# Patient Record
Sex: FEMALE | Race: WHITE | Employment: OTHER | URBAN - METROPOLITAN AREA
[De-identification: names, ages, dates, MRNs, and addresses within clinical notes are randomized per-mention and may not be internally consistent; named-entity substitution may affect disease eponyms.]

---

## 2021-06-30 PROBLEM — I25.5 ISCHEMIC CARDIOMYOPATHY: Status: ACTIVE | Noted: 2021-06-30

## 2021-07-01 ENCOUNTER — HOSPITAL ENCOUNTER (OUTPATIENT)
Age: 61
Discharge: HOME | End: 2021-07-02
Attending: INTERNAL MEDICINE | Admitting: INTERNAL MEDICINE
Payer: COMMERCIAL

## 2021-07-01 DIAGNOSIS — I25.5 ISCHEMIC CARDIOMYOPATHY: ICD-10-CM

## 2021-07-01 DIAGNOSIS — I25.10 CORONARY ARTERY DISEASE WITHOUT ANGINA PECTORIS, UNSPECIFIED VESSEL OR LESION TYPE, UNSPECIFIED WHETHER NATIVE OR TRANSPLANTED HEART: ICD-10-CM

## 2021-07-01 LAB
ACT BLD: 246 SECS (ref 70–128)
ACT BLD: 301 SECS (ref 70–128)
ANION GAP SERPL CALC-SCNC: 6 MMOL/L (ref 7–16)
ATRIAL RATE: 79 BPM
ATRIAL RATE: 93 BPM
BASOPHILS # BLD: 0.1 K/UL (ref 0–0.2)
BASOPHILS NFR BLD: 1 % (ref 0–2)
BUN SERPL-MCNC: 12 MG/DL (ref 6–23)
CALCIUM SERPL-MCNC: 9 MG/DL (ref 8.3–10.4)
CALCULATED P AXIS, ECG09: 49 DEGREES
CALCULATED P AXIS, ECG09: 54 DEGREES
CALCULATED R AXIS, ECG10: 37 DEGREES
CALCULATED R AXIS, ECG10: 42 DEGREES
CALCULATED T AXIS, ECG11: 77 DEGREES
CALCULATED T AXIS, ECG11: 81 DEGREES
CHLORIDE SERPL-SCNC: 106 MMOL/L (ref 98–107)
CO2 SERPL-SCNC: 29 MMOL/L (ref 21–32)
CREAT SERPL-MCNC: 0.49 MG/DL (ref 0.6–1)
DIAGNOSIS, 93000: NORMAL
DIAGNOSIS, 93000: NORMAL
DIFFERENTIAL METHOD BLD: NORMAL
EOSINOPHIL # BLD: 0.2 K/UL (ref 0–0.8)
EOSINOPHIL NFR BLD: 3 % (ref 0.5–7.8)
ERYTHROCYTE [DISTWIDTH] IN BLOOD BY AUTOMATED COUNT: 13.4 % (ref 11.9–14.6)
GLUCOSE BLD STRIP.AUTO-MCNC: 186 MG/DL (ref 65–100)
GLUCOSE BLD STRIP.AUTO-MCNC: 216 MG/DL (ref 65–100)
GLUCOSE SERPL-MCNC: 213 MG/DL (ref 65–100)
HCT VFR BLD AUTO: 42.4 % (ref 35.8–46.3)
HGB BLD-MCNC: 14.1 G/DL (ref 11.7–15.4)
IMM GRANULOCYTES # BLD AUTO: 0 K/UL (ref 0–0.5)
IMM GRANULOCYTES NFR BLD AUTO: 1 % (ref 0–5)
INR PPP: 0.9
LYMPHOCYTES # BLD: 2.4 K/UL (ref 0.5–4.6)
LYMPHOCYTES NFR BLD: 30 % (ref 13–44)
MAGNESIUM SERPL-MCNC: 1.9 MG/DL (ref 1.8–2.4)
MCH RBC QN AUTO: 29.7 PG (ref 26.1–32.9)
MCHC RBC AUTO-ENTMCNC: 33.3 G/DL (ref 31.4–35)
MCV RBC AUTO: 89.3 FL (ref 79.6–97.8)
MONOCYTES # BLD: 0.6 K/UL (ref 0.1–1.3)
MONOCYTES NFR BLD: 7 % (ref 4–12)
NEUTS SEG # BLD: 4.7 K/UL (ref 1.7–8.2)
NEUTS SEG NFR BLD: 59 % (ref 43–78)
NRBC # BLD: 0 K/UL (ref 0–0.2)
P-R INTERVAL, ECG05: 216 MS
P-R INTERVAL, ECG05: 236 MS
PLATELET # BLD AUTO: 201 K/UL (ref 150–450)
PMV BLD AUTO: 10.9 FL (ref 9.4–12.3)
POTASSIUM SERPL-SCNC: 3.9 MMOL/L (ref 3.5–5.1)
PROTHROMBIN TIME: 12.6 SEC (ref 12.5–14.7)
Q-T INTERVAL, ECG07: 392 MS
Q-T INTERVAL, ECG07: 434 MS
QRS DURATION, ECG06: 80 MS
QRS DURATION, ECG06: 82 MS
QTC CALCULATION (BEZET), ECG08: 487 MS
QTC CALCULATION (BEZET), ECG08: 497 MS
RBC # BLD AUTO: 4.75 M/UL (ref 4.05–5.2)
SERVICE CMNT-IMP: ABNORMAL
SERVICE CMNT-IMP: ABNORMAL
SODIUM SERPL-SCNC: 141 MMOL/L (ref 136–145)
VENTRICULAR RATE, ECG03: 79 BPM
VENTRICULAR RATE, ECG03: 93 BPM
WBC # BLD AUTO: 8 K/UL (ref 4.3–11.1)

## 2021-07-01 PROCEDURE — 76210000023 HC REC RM PH II 2 TO 2.5 HR: Performed by: INTERNAL MEDICINE

## 2021-07-01 PROCEDURE — C1725 CATH, TRANSLUMIN NON-LASER: HCPCS | Performed by: INTERNAL MEDICINE

## 2021-07-01 PROCEDURE — C1874 STENT, COATED/COV W/DEL SYS: HCPCS | Performed by: INTERNAL MEDICINE

## 2021-07-01 PROCEDURE — 93005 ELECTROCARDIOGRAM TRACING: CPT | Performed by: INTERNAL MEDICINE

## 2021-07-01 PROCEDURE — 82962 GLUCOSE BLOOD TEST: CPT

## 2021-07-01 PROCEDURE — 92943 PRQ TRLUML REVSC CH OCC ANT: CPT | Performed by: INTERNAL MEDICINE

## 2021-07-01 PROCEDURE — 74011250637 HC RX REV CODE- 250/637: Performed by: INTERNAL MEDICINE

## 2021-07-01 PROCEDURE — 85610 PROTHROMBIN TIME: CPT

## 2021-07-01 PROCEDURE — 92978 ENDOLUMINL IVUS OCT C 1ST: CPT | Performed by: INTERNAL MEDICINE

## 2021-07-01 PROCEDURE — 77030016699 HC CATH ANGI DX INFN1 CARD -A: Performed by: INTERNAL MEDICINE

## 2021-07-01 PROCEDURE — 85347 COAGULATION TIME ACTIVATED: CPT

## 2021-07-01 PROCEDURE — 99152 MOD SED SAME PHYS/QHP 5/>YRS: CPT | Performed by: INTERNAL MEDICINE

## 2021-07-01 PROCEDURE — C1887 CATHETER, GUIDING: HCPCS | Performed by: INTERNAL MEDICINE

## 2021-07-01 PROCEDURE — C1876 STENT, NON-COA/NON-COV W/DEL: HCPCS | Performed by: INTERNAL MEDICINE

## 2021-07-01 PROCEDURE — 93458 L HRT ARTERY/VENTRICLE ANGIO: CPT | Performed by: INTERNAL MEDICINE

## 2021-07-01 PROCEDURE — 80048 BASIC METABOLIC PNL TOTAL CA: CPT

## 2021-07-01 PROCEDURE — C1894 INTRO/SHEATH, NON-LASER: HCPCS | Performed by: INTERNAL MEDICINE

## 2021-07-01 PROCEDURE — C1769 GUIDE WIRE: HCPCS | Performed by: INTERNAL MEDICINE

## 2021-07-01 PROCEDURE — 85025 COMPLETE CBC W/AUTO DIFF WBC: CPT

## 2021-07-01 PROCEDURE — 74011000636 HC RX REV CODE- 636: Performed by: INTERNAL MEDICINE

## 2021-07-01 PROCEDURE — 77030029997 HC DEV COM RDL R BND TELE -B: Performed by: INTERNAL MEDICINE

## 2021-07-01 PROCEDURE — 74011250636 HC RX REV CODE- 250/636: Performed by: INTERNAL MEDICINE

## 2021-07-01 PROCEDURE — 77030012468 HC VLV BLEEDBK CNTRL ABBT -B: Performed by: INTERNAL MEDICINE

## 2021-07-01 PROCEDURE — 83735 ASSAY OF MAGNESIUM: CPT

## 2021-07-01 PROCEDURE — 74011000250 HC RX REV CODE- 250: Performed by: INTERNAL MEDICINE

## 2021-07-01 PROCEDURE — 99153 MOD SED SAME PHYS/QHP EA: CPT | Performed by: INTERNAL MEDICINE

## 2021-07-01 PROCEDURE — C1753 CATH, INTRAVAS ULTRASOUND: HCPCS | Performed by: INTERNAL MEDICINE

## 2021-07-01 DEVICE — STENT COR DES 2.50X22MM -- DES RESOLUTE ONYX: Type: IMPLANTABLE DEVICE | Status: FUNCTIONAL

## 2021-07-01 DEVICE — IMPLANTABLE DEVICE: Type: IMPLANTABLE DEVICE | Status: FUNCTIONAL

## 2021-07-01 RX ORDER — HYDROMORPHONE HYDROCHLORIDE 2 MG/ML
INJECTION, SOLUTION INTRAMUSCULAR; INTRAVENOUS; SUBCUTANEOUS AS NEEDED
Status: DISCONTINUED | OUTPATIENT
Start: 2021-07-01 | End: 2021-07-01 | Stop reason: HOSPADM

## 2021-07-01 RX ORDER — ATORVASTATIN CALCIUM 40 MG/1
40 TABLET, FILM COATED ORAL
Status: DISCONTINUED | OUTPATIENT
Start: 2021-07-01 | End: 2021-07-02 | Stop reason: HOSPADM

## 2021-07-01 RX ORDER — GUAIFENESIN 100 MG/5ML
81 LIQUID (ML) ORAL DAILY
Status: DISCONTINUED | OUTPATIENT
Start: 2021-07-02 | End: 2021-07-02 | Stop reason: HOSPADM

## 2021-07-01 RX ORDER — MORPHINE SULFATE 2 MG/ML
1 INJECTION, SOLUTION INTRAMUSCULAR; INTRAVENOUS
Status: DISCONTINUED | OUTPATIENT
Start: 2021-07-01 | End: 2021-07-02 | Stop reason: HOSPADM

## 2021-07-01 RX ORDER — SODIUM CHLORIDE 9 MG/ML
75 INJECTION, SOLUTION INTRAVENOUS CONTINUOUS
Status: DISCONTINUED | OUTPATIENT
Start: 2021-07-01 | End: 2021-07-02 | Stop reason: HOSPADM

## 2021-07-01 RX ORDER — NITROGLYCERIN 0.4 MG/1
0.4 TABLET SUBLINGUAL
Status: DISCONTINUED | OUTPATIENT
Start: 2021-07-01 | End: 2021-07-02 | Stop reason: HOSPADM

## 2021-07-01 RX ORDER — PANTOPRAZOLE SODIUM 40 MG/1
40 TABLET, DELAYED RELEASE ORAL
Status: DISCONTINUED | OUTPATIENT
Start: 2021-07-02 | End: 2021-07-02 | Stop reason: HOSPADM

## 2021-07-01 RX ORDER — HEPARIN SODIUM 200 [USP'U]/100ML
INJECTION, SOLUTION INTRAVENOUS
Status: COMPLETED | OUTPATIENT
Start: 2021-07-01 | End: 2021-07-01

## 2021-07-01 RX ORDER — MONTELUKAST SODIUM 10 MG/1
10 TABLET ORAL
Status: DISCONTINUED | OUTPATIENT
Start: 2021-07-01 | End: 2021-07-02 | Stop reason: HOSPADM

## 2021-07-01 RX ORDER — ONDANSETRON 2 MG/ML
4 INJECTION INTRAMUSCULAR; INTRAVENOUS AS NEEDED
Status: DISCONTINUED | OUTPATIENT
Start: 2021-07-01 | End: 2021-07-01 | Stop reason: HOSPADM

## 2021-07-01 RX ORDER — SODIUM CHLORIDE 0.9 % (FLUSH) 0.9 %
5-40 SYRINGE (ML) INJECTION AS NEEDED
Status: DISCONTINUED | OUTPATIENT
Start: 2021-07-01 | End: 2021-07-02 | Stop reason: HOSPADM

## 2021-07-01 RX ORDER — ACETAMINOPHEN 325 MG/1
650 TABLET ORAL
Status: DISCONTINUED | OUTPATIENT
Start: 2021-07-01 | End: 2021-07-02 | Stop reason: HOSPADM

## 2021-07-01 RX ORDER — LORAZEPAM 1 MG/1
1 TABLET ORAL
Status: DISCONTINUED | OUTPATIENT
Start: 2021-07-01 | End: 2021-07-02 | Stop reason: HOSPADM

## 2021-07-01 RX ORDER — HEPARIN SODIUM 10000 [USP'U]/ML
INJECTION, SOLUTION INTRAVENOUS; SUBCUTANEOUS AS NEEDED
Status: DISCONTINUED | OUTPATIENT
Start: 2021-07-01 | End: 2021-07-01 | Stop reason: HOSPADM

## 2021-07-01 RX ORDER — FENTANYL CITRATE 50 UG/ML
INJECTION, SOLUTION INTRAMUSCULAR; INTRAVENOUS AS NEEDED
Status: DISCONTINUED | OUTPATIENT
Start: 2021-07-01 | End: 2021-07-01 | Stop reason: HOSPADM

## 2021-07-01 RX ORDER — MIDAZOLAM HYDROCHLORIDE 1 MG/ML
INJECTION, SOLUTION INTRAMUSCULAR; INTRAVENOUS AS NEEDED
Status: DISCONTINUED | OUTPATIENT
Start: 2021-07-01 | End: 2021-07-01 | Stop reason: HOSPADM

## 2021-07-01 RX ORDER — LIDOCAINE HYDROCHLORIDE 10 MG/ML
INJECTION INFILTRATION; PERINEURAL AS NEEDED
Status: DISCONTINUED | OUTPATIENT
Start: 2021-07-01 | End: 2021-07-01 | Stop reason: HOSPADM

## 2021-07-01 RX ORDER — ASPIRIN 81 MG/1
81 TABLET ORAL
Status: DISCONTINUED | OUTPATIENT
Start: 2021-07-01 | End: 2021-07-01

## 2021-07-01 RX ORDER — GUAIFENESIN 100 MG/5ML
324 LIQUID (ML) ORAL ONCE
Status: DISCONTINUED | OUTPATIENT
Start: 2021-07-01 | End: 2021-07-01 | Stop reason: HOSPADM

## 2021-07-01 RX ORDER — LORATADINE 10 MG/1
10 TABLET ORAL
Status: DISCONTINUED | OUTPATIENT
Start: 2021-07-01 | End: 2021-07-02 | Stop reason: HOSPADM

## 2021-07-01 RX ORDER — SODIUM CHLORIDE 0.9 % (FLUSH) 0.9 %
5-40 SYRINGE (ML) INJECTION EVERY 8 HOURS
Status: DISCONTINUED | OUTPATIENT
Start: 2021-07-01 | End: 2021-07-02 | Stop reason: HOSPADM

## 2021-07-01 RX ADMIN — TICAGRELOR 90 MG: 90 TABLET ORAL at 23:11

## 2021-07-01 RX ADMIN — ONDANSETRON 4 MG: 2 INJECTION INTRAMUSCULAR; INTRAVENOUS at 11:32

## 2021-07-01 RX ADMIN — ATORVASTATIN CALCIUM 40 MG: 40 TABLET, FILM COATED ORAL at 21:48

## 2021-07-01 RX ADMIN — Medication 10 ML: at 21:48

## 2021-07-01 RX ADMIN — MONTELUKAST 10 MG: 10 TABLET, FILM COATED ORAL at 21:48

## 2021-07-01 RX ADMIN — Medication 10 ML: at 15:17

## 2021-07-01 RX ADMIN — LORATADINE 10 MG: 10 TABLET ORAL at 21:48

## 2021-07-01 RX ADMIN — PROMETHAZINE HYDROCHLORIDE 12.5 MG: 25 INJECTION INTRAMUSCULAR; INTRAVENOUS at 11:45

## 2021-07-01 NOTE — PROGRESS NOTES
Patient pre-assessment complete for [facility] poss with Dr Reyes Manifold scheduled for 21 at 9:30am, arrival time 7:30am. Patient verified using . Patient instructed to bring all home medications in labeled bottles on the day of procedure. NPO status reinforced. Patient informed to take a full dose aspirin 325mg  or 81 mg x 4 on the day of procedure. Patient instructed to HOLD metformin tonight & in am & HOLD Rybelsus in am. Instructed they can take all other medications excluding vitamins & supplements. Patient verbalizes understanding of all instructions & denies any questions at this time.

## 2021-07-01 NOTE — Clinical Note
Balloon inserted. Balloon inflated using multiple inflation technique. Lesion #1: Pressure = 13 guerline; Duration = 13 sec. Inflation 2: Pressure = 20 guerline; Duration = 14 sec. Inflation 3: Pressure = 20 guerline; Duration = 12 sec.

## 2021-07-01 NOTE — PROGRESS NOTES
TRANSFER - OUT REPORT:    Verbal report given to RN(name) on Nereida Remy  being transferred to CPRU(unit) for routine progression of care       Report consisted of patients Situation, Background, Assessment and   Recommendations(SBAR). Information from the following report(s) SBAR was reviewed with the receiving nurse.     [facility] w/ Dr. Muro Ours  2 stents to LAD -   IVUS  R radial  TR band 10 mls  6 mg versed  100 mcg fentanyl  3 mg dilaudid  3000 units heparin  Last   180 mg brilinta

## 2021-07-01 NOTE — Clinical Note
Aspirin Registry Question:   Aspirin was given prior to the procedure.  [address] [facility]

## 2021-07-01 NOTE — Clinical Note
Balloon inserted. Balloon inflated using multiple inflation technique. Lesion #1: Pressure = 16 guerline; Duration = 12 sec. Inflation 2: Pressure = 18 guerline; Duration = 11 sec. Inflation 3: Pressure = 20 guerline; Duration = 16 sec.

## 2021-07-01 NOTE — PROGRESS NOTES
Care Management Interventions  PCP Verified by CM: Yes Kourtney Overton)  Mode of Transport at Discharge: Other (see comment) Alis Jun Ray.,Jun A. Spouse [phone] )  Transition of Care Consult (CM Consult): Discharge Planning  Discharge Durable Medical Equipment: No  Physical Therapy Consult: No  Occupational Therapy Consult: No  Discharge Location  Discharge Placement: Home  Pt admitted for scheduled LHC for wall motion abnormality on ECHO. Chart screened by  for discharge planning. No needs identified at this time. Please consult  if any new issues arise.

## 2021-07-01 NOTE — Clinical Note
Balloon inserted. Balloon inflated using multiple inflation technique. Lesion #1: Pressure = 8 guerline; Duration = 17 sec. Inflation 2: Pressure = 15 guerline; Duration = 16 sec. Inflation 3: Pressure = 15 guerline; Duration = 16 sec.

## 2021-07-01 NOTE — ROUTINE PROCESS
Patient received to [address] [place] [address] room # 12  Ambulatory from [facility]. Patient scheduled for C today with Dr Clifford Kamara. Procedure reviewed & questions answered, voiced good understanding consent obtained & placed on chart. All medications and medical history reviewed. Will prep patient per orders. Patient & family updated on plan of care.       The patient has a fraility score of 3-MANAGING WELL independent of adl's    Patient took Aspirin 324 mg today at 0600 prior to arrival.

## 2021-07-01 NOTE — Clinical Note
Single view of the left ventricle obtained using power injection. Total volume = 30 mL. Rate = 10 mL/sec.

## 2021-07-01 NOTE — Clinical Note
Balloon inflated using multiple inflation technique. Lesion #1: Pressure = 15 guerline; Duration = 13 sec. Inflation 2: Pressure = 15 guerline; Duration = 11 sec.

## 2021-07-01 NOTE — Clinical Note
Contrast Dose Calculator:   Patient's age: 62.   Patient's sex: Female. Patient weight (kg) = 76.2. Creatinine level (mg/dL) = 0.49. Creatinine clearance (mL/min): 149. Contrast concentration (mg/mL) = 370. MACD = 300 mL. Max Contrast dose per Creatinine Cl calculator = 335.25 mL.

## 2021-07-01 NOTE — PROGRESS NOTES
TRANSFER - OUT REPORT:    Verbal report given to María Elena Mcguire on Rafael Del Rosario  being transferred to [id number] for routine post - op       Report consisted of patients Situation, Background, Assessment and Recommendations(SBAR). Information from the following report(s) Procedure Summary and MAR was reviewed with the receiving nurse. Opportunity for questions and clarification was provided.

## 2021-07-01 NOTE — Clinical Note
Stent inserted. Multiple inflations used. First inflation pressure = 12 guerline; inflation time: 10 sec. Second inflation pressure: 12 guerline; inflation time: 15 sec.

## 2021-07-01 NOTE — ROUTINE PROCESS
Bedside and Verbal shift change report to be received from [employer]. Report included the following information SBAR, Kardex, Intake/Output, MAR and Recent Results.

## 2021-07-01 NOTE — Clinical Note
Balloon inserted. Balloon inflated using multiple inflation technique. Lesion #1: Pressure = 12 guerline; Duration = 25 sec. Inflation 2: Pressure = 8 guerline; Duration = 15 sec.

## 2021-07-02 VITALS
SYSTOLIC BLOOD PRESSURE: 136 MMHG | HEART RATE: 96 BPM | HEIGHT: 66 IN | TEMPERATURE: 97.8 F | WEIGHT: 166.3 LBS | OXYGEN SATURATION: 99 % | RESPIRATION RATE: 18 BRPM | DIASTOLIC BLOOD PRESSURE: 80 MMHG | BODY MASS INDEX: 26.73 KG/M2

## 2021-07-02 LAB
ANION GAP SERPL CALC-SCNC: 4 MMOL/L (ref 7–16)
BASOPHILS # BLD: 0.1 K/UL (ref 0–0.2)
BASOPHILS NFR BLD: 1 % (ref 0–2)
BUN SERPL-MCNC: 7 MG/DL (ref 6–23)
CALCIUM SERPL-MCNC: 8.7 MG/DL (ref 8.3–10.4)
CHLORIDE SERPL-SCNC: 109 MMOL/L (ref 98–107)
CO2 SERPL-SCNC: 28 MMOL/L (ref 21–32)
CREAT SERPL-MCNC: 0.46 MG/DL (ref 0.6–1)
DIFFERENTIAL METHOD BLD: NORMAL
EOSINOPHIL # BLD: 0.2 K/UL (ref 0–0.8)
EOSINOPHIL NFR BLD: 2 % (ref 0.5–7.8)
ERYTHROCYTE [DISTWIDTH] IN BLOOD BY AUTOMATED COUNT: 13.4 % (ref 11.9–14.6)
GLUCOSE BLD STRIP.AUTO-MCNC: 184 MG/DL (ref 65–100)
GLUCOSE SERPL-MCNC: 165 MG/DL (ref 65–100)
HCT VFR BLD AUTO: 39.3 % (ref 35.8–46.3)
HGB BLD-MCNC: 13 G/DL (ref 11.7–15.4)
IMM GRANULOCYTES # BLD AUTO: 0 K/UL (ref 0–0.5)
IMM GRANULOCYTES NFR BLD AUTO: 0 % (ref 0–5)
LYMPHOCYTES # BLD: 1.9 K/UL (ref 0.5–4.6)
LYMPHOCYTES NFR BLD: 20 % (ref 13–44)
MCH RBC QN AUTO: 29.5 PG (ref 26.1–32.9)
MCHC RBC AUTO-ENTMCNC: 33.1 G/DL (ref 31.4–35)
MCV RBC AUTO: 89.1 FL (ref 79.6–97.8)
MONOCYTES # BLD: 0.6 K/UL (ref 0.1–1.3)
MONOCYTES NFR BLD: 7 % (ref 4–12)
NEUTS SEG # BLD: 6.6 K/UL (ref 1.7–8.2)
NEUTS SEG NFR BLD: 70 % (ref 43–78)
NRBC # BLD: 0 K/UL (ref 0–0.2)
PLATELET # BLD AUTO: 183 K/UL (ref 150–450)
PMV BLD AUTO: 11.2 FL (ref 9.4–12.3)
POTASSIUM SERPL-SCNC: 4.1 MMOL/L (ref 3.5–5.1)
RBC # BLD AUTO: 4.41 M/UL (ref 4.05–5.2)
SERVICE CMNT-IMP: ABNORMAL
SODIUM SERPL-SCNC: 141 MMOL/L (ref 136–145)
WBC # BLD AUTO: 9.4 K/UL (ref 4.3–11.1)

## 2021-07-02 PROCEDURE — 99212 OFFICE O/P EST SF 10 MIN: CPT | Performed by: INTERNAL MEDICINE

## 2021-07-02 PROCEDURE — 80048 BASIC METABOLIC PNL TOTAL CA: CPT

## 2021-07-02 PROCEDURE — 74011250637 HC RX REV CODE- 250/637: Performed by: INTERNAL MEDICINE

## 2021-07-02 PROCEDURE — 36415 COLL VENOUS BLD VENIPUNCTURE: CPT

## 2021-07-02 PROCEDURE — 85025 COMPLETE CBC W/AUTO DIFF WBC: CPT

## 2021-07-02 PROCEDURE — 82962 GLUCOSE BLOOD TEST: CPT

## 2021-07-02 RX ORDER — LOSARTAN POTASSIUM 25 MG/1
25 TABLET ORAL DAILY
Status: DISCONTINUED | OUTPATIENT
Start: 2021-07-02 | End: 2021-07-02 | Stop reason: HOSPADM

## 2021-07-02 RX ORDER — LOSARTAN POTASSIUM 25 MG/1
25 TABLET ORAL DAILY
Qty: 90 TABLET | Refills: 3 | Status: SHIPPED | OUTPATIENT
Start: 2021-07-02 | End: 2021-07-02 | Stop reason: SDUPTHER

## 2021-07-02 RX ORDER — NITROGLYCERIN 0.4 MG/1
0.4 TABLET SUBLINGUAL
Qty: 1 BOTTLE | Refills: 11 | Status: SHIPPED | OUTPATIENT
Start: 2021-07-02 | End: 2021-07-02 | Stop reason: SDUPTHER

## 2021-07-02 RX ORDER — CARVEDILOL 3.12 MG/1
3.12 TABLET ORAL 2 TIMES DAILY WITH MEALS
Qty: 180 TABLET | Refills: 3 | Status: SHIPPED | OUTPATIENT
Start: 2021-07-02 | End: 2021-07-02 | Stop reason: SDUPTHER

## 2021-07-02 RX ORDER — LOSARTAN POTASSIUM 25 MG/1
25 TABLET ORAL DAILY
Qty: 90 TABLET | Refills: 3 | Status: SHIPPED | OUTPATIENT
Start: 2021-07-02 | End: 2022-05-11 | Stop reason: SDUPTHER

## 2021-07-02 RX ORDER — CARVEDILOL 3.12 MG/1
3.12 TABLET ORAL 2 TIMES DAILY WITH MEALS
Qty: 180 TABLET | Refills: 3 | Status: SHIPPED | OUTPATIENT
Start: 2021-07-02 | End: 2021-09-30 | Stop reason: SDUPTHER

## 2021-07-02 RX ORDER — NITROGLYCERIN 0.4 MG/1
0.4 TABLET SUBLINGUAL
Qty: 1 BOTTLE | Refills: 11 | Status: SHIPPED | OUTPATIENT
Start: 2021-07-02

## 2021-07-02 RX ORDER — CARVEDILOL 3.12 MG/1
3.12 TABLET ORAL 2 TIMES DAILY WITH MEALS
Status: DISCONTINUED | OUTPATIENT
Start: 2021-07-02 | End: 2021-07-02 | Stop reason: HOSPADM

## 2021-07-02 RX ADMIN — Medication 10 ML: at 05:25

## 2021-07-02 RX ADMIN — ASPIRIN 81 MG: 81 TABLET, CHEWABLE ORAL at 08:07

## 2021-07-02 RX ADMIN — PANTOPRAZOLE SODIUM 40 MG: 40 TABLET, DELAYED RELEASE ORAL at 05:25

## 2021-07-02 RX ADMIN — LOSARTAN POTASSIUM 25 MG: 25 TABLET, FILM COATED ORAL at 08:07

## 2021-07-02 RX ADMIN — ACETAMINOPHEN 650 MG: 325 TABLET ORAL at 05:31

## 2021-07-02 RX ADMIN — CARVEDILOL 3.12 MG: 3.12 TABLET, FILM COATED ORAL at 08:07

## 2021-07-02 NOTE — ROUTINE PROCESS
Cardiac Rehab: Spoke with patient regarding referral to cardiac rehab. Patient meets admission criteria based on PCI (7/1/21). Written information about Cardiac Rehab given and reviewed with patient. Discussed lifestyle modifications to promote cardiac wellness. Patient indicated that she may want to participate in the cardiac rehab program at the [address] [place] [address] which is closest to her home in [place], [place]. We will forward her referral to the [facility] Cardiac Rehab as appropriate. Her Cardiologist is Dr. Markus Esposito.       Thank you,  ROXI SalazarN, RN  Cardiopulmonary Rehabilitation Nurse Liaison  [facility]

## 2021-07-02 NOTE — ROUTINE PROCESS
Bedside and Verbal shift change report to be given to Evon De La Rosa RN (oncoming nurse) by self (offgoing nurse). Report included the following information SBAR, Kardex, Intake/Output, MAR and Recent Results. Right radial site CDI.

## 2021-07-02 NOTE — PROGRESS NOTES
Problem: Falls - Risk of  Goal: *Absence of Falls  Description: Document Benson Miracle Fall Risk and appropriate interventions in the flowsheet.   Outcome: Progressing Towards Goal  Note: Fall Risk Interventions:  Mobility Interventions: Bed/chair exit alarm, Communicate number of staff needed for ambulation/transfer, Patient to call before getting OOB         Medication Interventions: Assess postural VS orthostatic hypotension, Evaluate medications/consider consulting pharmacy, Patient to call before getting OOB, Teach patient to arise slowly

## 2021-07-02 NOTE — DISCHARGE INSTRUCTIONS
Patient Education     Patient Education      Ticagrelor (Brilinta) - (By mouth)   Why this medicine is used:   Helps prevent stroke, heart attack, and other heart problems. Contact a nurse or doctor right away if you have:  · Sudden or severe headache  · Shortness of breath, trouble breathing  · Bloody vomit or vomit that looks like coffee grounds; bloody or black, tarry stools  · Bleeding that does not stop or bruises that do not heal     Common side effects:  · Minor bleeding or bruising  · Headache  © 2017 [address] is for End User's use only and may not be sold, redistributed or otherwise used for commercial purposes. Resume glucophage on Sunday July 4  Learning About Coronary Angioplasty  What is a coronary angioplasty? Coronary angioplasty is a procedure that uses a thin tube called a catheter to open a blocked or narrowed coronary artery. Coronary arteries are the blood vessels that bring oxygen to the heart muscle. Angioplasty also may be called percutaneous coronary intervention (PCI). Angioplasty can widen an artery that has been narrowed by fatty buildup (plaque) or blocked by a blood clot. The procedure helps blood flow more normally to the heart muscle. How is the procedure done? Coronary angioplasty is done in a cardiac catheterization laboratory (\"cath lab\"). It is done by a heart specialist called a cardiologist. The whole procedure may take 1½ to 3 hours. You lie on a table under a large X-ray machine. You will get medicine through an IV in one of your veins. It helps you relax and not feel pain. You will be awake during the procedure. But you may not be able to remember much about it. The doctor will inject some medicine into your arm or groin to numb the skin. You will feel a small needle poke you. It's like having a blood test. You may feel some pressure when the doctor puts in the catheter. But you will not feel pain.   The doctor will look at X-ray pictures on a monitor (like a TV screen) to move the catheter to your heart. The doctor then puts a dye into the catheter. This makes your heart's arteries show up on a screen. The doctor can then see any arteries that are blocked or narrowed. You may feel warm or flushed for a short time when the doctor injects dye into your artery. If you have a blocked or narrow artery, the doctor uses a catheter with a tiny balloon at the tip. The doctor puts it into the blocked or narrow area and inflates it. The balloon presses the fatty buildup against the walls of the artery. This buildup is called plaque. This creates more room for blood to flow. In most cases, the doctor then puts a stent in the artery. A stent is a small, expandable tube. It presses against the walls of the artery. The stent is left in the artery to keep the artery open. This helps blood flow. The catheter is removed from your body. What happens right after the procedure? The catheter will be removed. A nurse or doctor may press on a bandage on the opening. Then a bandage or a compression device may be placed on your groin or wrist at the catheter insertion site. This prevents bleeding. After the procedure, you will be taken to a room where the catheter site and your heart rate, blood pressure, and temperature will be checked several times. If the catheter was put in your groin, you will need to lie still and keep your leg straight for several hours. If the catheter was put in your arm, you may need to keep your arm still for at least 2 hours. You may go home the same day. Or you may stay at least 1 night in the hospital. When you go home, you will get instructions from your doctor to help you recover well and prevent problems. Make sure to drink plenty of fluids (unless your doctor tells you not to) for several hours after the procedure. This will help flush the dye out of your body. Follow-up care is a key part of your treatment and safety. Be sure to make and go to all appointments, and call your doctor if you are having problems. It's also a good idea to know your test results and keep a list of the medicines you take. Where can you learn more? Go to http://www.gray.com/  Enter M058 in the search box to learn more about \"Learning About Coronary Angioplasty. \"  Current as of: August 31, 2020               Content Version: 12.8  © 2006-2021 [employer]wise, [employer]. Care instructions adapted under license by [employer] (which disclaims liability or warranty for this information). If you have questions about a medical condition or this instruction, always ask your healthcare professional. [place] [address] any warranty or liability for your use of this information.

## 2021-07-02 NOTE — DISCHARGE SUMMARY
[facility] Discharge Summary     Patient ID:  Jessenia Blackwood  [id number]  62 y.o.  1960    Admit date: 7/1/2021    Discharge date:  7/2/2021    Admitting Physician: Phi Price MD     Discharge Physician: Dr. Bernabe Morales    Admission Diagnoses: Ischemic cardiomyopathy [I25.5]  CAD (coronary artery disease) [I25.10]    Discharge Diagnoses:    Diagnosis    CAD (coronary artery disease)    Ischemic cardiomyopathy       Cardiology Procedures this admission:  Left heart catheterization with PCI  Consults: None    Hospital Course: Patient was seen at the office of [facility] by Dr. Zakiya Malave for complaints of chest pain and was subsequently scheduled for an AM Admission [facility] on 7/2/21. Patient underwent cardiac catheterization by Dr. Bernabe Morales. Patient was found to have  of the LAD that was stented with a 2.5 x 22-mm and 2.25 x 38-mm WILBERT with 0% residual stenosis. Patient tolerated the procedure well and was taken to the telemetry floor for recovery. The morning of discharge, patient was up feeling well without any complaints of chest pain or shortness of breath. Patient's right radial cath site was clean, dry and intact without hematoma or bruit. Patient's labs were WNL. Patient was seen and examined by Dr. Bernabe Morales and determined stable and ready for discharge. Patient was instructed on the importance of medication compliance including taking aspirin and Brilinta everyday without missing a dose. After receiving drug eluting stents, the patient will remain on dual anti-platelet therapy for 1 year. For maximized medical therapy for CAD, patient will continue  BB, ARB, and statin as well. For CHF will cont ARB and BB. The patient will follow up with [facility] -- Dr. Zakiya Malave in Aug 25 at [address] [place] [address] office. Patient has been referred to cardiac rehab. Check BMP before fu.     15 minutes spent discussing hospital course and d/c instructions w patient.   All questions answered. Indication for treatment and risk of dual antiplatelet therapy was discussed with the patient and he/she understands to seek medical care immediately if any signs of bleeding. DISPOSITION: The patient is being discharged home in stable condition on a low saturated fat, low cholesterol and low salt diet. The patient is instructed to advance activities as tolerated to the limit of fatigue or shortness of breath. The patient is instructed to avoid all heavy lifting for 3-5 days. The patient is instructed to watch the cath site for bleeding/oozing; if seen, the patient is instructed to apply firm pressure with a clean cloth and call [facility] at [phone]. The patient is instructed to watch for signs of infection which include: increasing area of redness, fever/hot to touch or purulent drainage at the catheterization site. The patient is instructed not to soak in a bathtub for 7-10 days, but is cleared to shower. The patient is instructed to call the office or return to the ER for immediate evaluation for any shortness of breath or chest pain not relieved by NTG. Discharge Exam: Patient has been seen by Dr. Kailash Bland: see his progress note for exam details.     Visit Vitals  /61 (BP 1 Location: Left upper arm, BP Patient Position: At rest)   Pulse 86   Temp 97.8 °F (36.6 °C)   Resp 16   Ht 5' 6\" (1.676 m)   Wt 76.2 kg (168 lb)   SpO2 96%   Breastfeeding No   BMI 27.12 kg/m²       Recent Results (from the past 24 hour(s))   POC ACTIVATED CLOTTING TIME    Collection Time: 07/01/21  9:56 AM   Result Value Ref Range    Activated Clotting Time (POC) 301 (H) 70 - 128 SECS   POC ACTIVATED CLOTTING TIME    Collection Time: 07/01/21 10:44 AM   Result Value Ref Range    Activated Clotting Time (POC) 246 (H) 70 - 128 SECS   EKG, 12 LEAD, INITIAL    Collection Time: 07/01/21 11:38 AM   Result Value Ref Range    Ventricular Rate 79 BPM    Atrial Rate 79 BPM    P-R Interval 216 ms    QRS Duration 82 ms    Q-T Interval 434 ms    QTC Calculation (Bezet) 497 ms    Calculated P Axis 49 degrees    Calculated R Axis 42 degrees    Calculated T Axis 77 degrees    Diagnosis       !!! Poor data quality, interpretation may be adversely affected  Sinus rhythm with 1st degree A-V block  Low voltage QRS  Prolonged QT  Abnormal ECG  When compared with ECG of 01-JUL-2021 07:52,  No significant change was found  Confirmed by Jose Queen ([id number]) on 7/1/2021 4:02:59 PM     GLUCOSE, POC    Collection Time: 07/01/21  3:36 PM   Result Value Ref Range    Glucose (POC) 186 (H) 65 - 100 mg/dL    Performed by [facility]    GLUCOSE, POC    Collection Time: 07/01/21  9:19 PM   Result Value Ref Range    Glucose (POC) 216 (H) 65 - 100 mg/dL    Performed by [facility]    METABOLIC PANEL, BASIC    Collection Time: 07/02/21  5:21 AM   Result Value Ref Range    Sodium 141 136 - 145 mmol/L    Potassium 4.1 3.5 - 5.1 mmol/L    Chloride 109 (H) 98 - 107 mmol/L    CO2 28 21 - 32 mmol/L    Anion gap 4 (L) 7 - 16 mmol/L    Glucose 165 (H) 65 - 100 mg/dL    BUN 7 6 - 23 MG/DL    Creatinine 0.46 (L) 0.6 - 1.0 MG/DL    GFR est AA >60 >60 ml/min/1.73m2    GFR est non-AA >60 >60 ml/min/1.73m2    Calcium 8.7 8.3 - 10.4 MG/DL   CBC WITH AUTOMATED DIFF    Collection Time: 07/02/21  5:21 AM   Result Value Ref Range    WBC 9.4 4.3 - 11.1 K/uL    RBC 4.41 4.05 - 5.2 M/uL    HGB 13.0 11.7 - 15.4 g/dL    HCT 39.3 35.8 - 46.3 %    MCV 89.1 79.6 - 97.8 FL    MCH 29.5 26.1 - 32.9 PG    MCHC 33.1 31.4 - 35.0 g/dL    RDW 13.4 11.9 - 14.6 %    PLATELET 338 041 - 832 K/uL    MPV 11.2 9.4 - 12.3 FL    ABSOLUTE NRBC 0.00 0.0 - 0.2 K/uL    DF AUTOMATED      NEUTROPHILS 70 43 - 78 %    LYMPHOCYTES 20 13 - 44 %    MONOCYTES 7 4.0 - 12.0 %    EOSINOPHILS 2 0.5 - 7.8 %    BASOPHILS 1 0.0 - 2.0 %    IMMATURE GRANULOCYTES 0 0.0 - 5.0 %    ABS. NEUTROPHILS 6.6 1.7 - 8.2 K/UL    ABS. LYMPHOCYTES 1.9 0.5 - 4.6 K/UL    ABS. MONOCYTES 0.6 0.1 - 1.3 K/UL    ABS. EOSINOPHILS 0.2 0.0 - 0.8 K/UL    ABS. BASOPHILS 0.1 0.0 - 0.2 K/UL    ABS. IMM. GRANS. 0.0 0.0 - 0.5 K/UL   GLUCOSE, POC    Collection Time: 07/02/21  7:26 AM   Result Value Ref Range    Glucose (POC) 184 (H) 65 - 100 mg/dL    Performed by Cinthia          Patient Instructions:   Current Discharge Medication List      START taking these medications    Details   carvediloL (COREG) 3.125 mg tablet Take 1 Tablet by mouth two (2) times daily (with meals). Qty: 180 Tablet, Refills: 3  Start date: 7/2/2021      losartan (COZAAR) 25 mg tablet Take 1 Tablet by mouth daily. Qty: 90 Tablet, Refills: 3  Start date: 7/2/2021      nitroglycerin (NITROSTAT) 0.4 mg SL tablet 1 Tablet by SubLINGual route every five (5) minutes as needed for Chest Pain. Up to 3 doses. Qty: 1 Bottle, Refills: 11  Start date: 7/2/2021      ticagrelor (BRILINTA) 90 mg tablet Take 1 Tablet by mouth every twelve (12) hours every twelve (12) hours. Qty: 60 Tablet, Refills: 11  Start date: 7/2/2021         CONTINUE these medications which have NOT CHANGED    Details   atorvastatin (LIPITOR) 40 mg tablet Take 1 Tablet by mouth daily. Qty: 30 Tablet, Refills: 11      aspirin delayed-release 81 mg tablet Take 81 mg by mouth nightly. Omeprazole delayed release (PRILOSEC D/R) 20 mg tablet Take 20 mg by mouth nightly. loratadine (Claritin) 10 mg tablet Take 10 mg by mouth nightly. cholecalciferol, vitamin D3, (Vitamin D3) 50 mcg (2,000 unit) tab Take 2,000 Units by mouth nightly. iron, carbonyl (FeosoL) 45 mg tab Take 1 Tablet by mouth nightly. montelukast (SINGULAIR) 10 mg tablet Take 10 mg by mouth nightly. estradioL (ESTRACE) 1 mg tablet Take 1 mg by mouth nightly. metFORMIN (GLUCOPHAGE) 500 mg tablet Take 1,000 mg by mouth two (2) times daily (with meals). semaglutide (Rybelsus) 14 mg tablet Take 14 mg by mouth daily.

## 2021-07-02 NOTE — PROGRESS NOTES
When passing PM meds, patient informed me that she had taken her own metformin tonight that she brought from home. Patient educated on the importance of not taking metformin right after a heart cath using contrast. Patient instructed to resume metformin no sooner than Sunday, per Scott Whitlock NP. Patient voiced understanding.

## 2021-07-02 NOTE — PROGRESS NOTES
Care Management Interventions  PCP Verified by CM: Yes Vincenzo Ponce)  Mode of Transport at Discharge: Other (see comment) Francoise Ha Jr.,Jun WOLF. Spouse [phone] )  Transition of Care Consult (CM Consult): Discharge Planning  Discharge Durable Medical Equipment: No  Physical Therapy Consult: No  Occupational Therapy Consult: No  Discharge Location  Discharge Placement: Home    Pt personally arranged transport.

## 2021-11-21 NOTE — PROGRESS NOTES
Although finding on your left breast is likely no cause for concern, repeat diagnostic imaging is recommended in 6 months to ensure stability. Thanks.   Aaron Van

## 2022-03-18 PROBLEM — I25.10 CAD IN NATIVE ARTERY: Status: ACTIVE | Noted: 2021-07-01

## 2022-03-19 PROBLEM — I25.5 ISCHEMIC CARDIOMYOPATHY: Status: ACTIVE | Noted: 2021-06-30

## 2022-04-17 ENCOUNTER — APPOINTMENT (OUTPATIENT)
Dept: CT IMAGING | Age: 62
End: 2022-04-17
Attending: STUDENT IN AN ORGANIZED HEALTH CARE EDUCATION/TRAINING PROGRAM
Payer: COMMERCIAL

## 2022-04-17 ENCOUNTER — HOSPITAL ENCOUNTER (OUTPATIENT)
Age: 62
Setting detail: OBSERVATION
Discharge: HOME | End: 2022-04-18
Attending: STUDENT IN AN ORGANIZED HEALTH CARE EDUCATION/TRAINING PROGRAM | Admitting: INTERNAL MEDICINE
Payer: COMMERCIAL

## 2022-04-17 ENCOUNTER — APPOINTMENT (OUTPATIENT)
Dept: GENERAL RADIOLOGY | Age: 62
End: 2022-04-17
Attending: STUDENT IN AN ORGANIZED HEALTH CARE EDUCATION/TRAINING PROGRAM
Payer: COMMERCIAL

## 2022-04-17 DIAGNOSIS — E83.42 HYPOMAGNESEMIA: ICD-10-CM

## 2022-04-17 DIAGNOSIS — R79.89 ELEVATED PROCALCITONIN: ICD-10-CM

## 2022-04-17 DIAGNOSIS — R07.2 PRECORDIAL PAIN: ICD-10-CM

## 2022-04-17 DIAGNOSIS — R00.0 TACHYCARDIA: Primary | ICD-10-CM

## 2022-04-17 DIAGNOSIS — E78.2 MIXED HYPERLIPIDEMIA: ICD-10-CM

## 2022-04-17 PROBLEM — R65.10 SIRS (SYSTEMIC INFLAMMATORY RESPONSE SYNDROME) (HCC): Status: ACTIVE | Noted: 2022-04-17

## 2022-04-17 PROBLEM — A41.9 SEPSIS (HCC): Status: ACTIVE | Noted: 2022-04-17

## 2022-04-17 PROBLEM — A41.9 SEPSIS (HCC): Status: RESOLVED | Noted: 2022-04-17 | Resolved: 2022-04-17

## 2022-04-17 LAB
ALBUMIN SERPL-MCNC: 3.1 G/DL (ref 3.2–4.6)
ALBUMIN/GLOB SERPL: 0.7 {RATIO} (ref 1.2–3.5)
ALP SERPL-CCNC: 128 U/L (ref 50–136)
ALT SERPL-CCNC: 22 U/L (ref 12–65)
ANION GAP SERPL CALC-SCNC: 10 MMOL/L (ref 7–16)
APPEARANCE UR: CLEAR
AST SERPL-CCNC: 12 U/L (ref 15–37)
BACTERIA URNS QL MICRO: 0 /HPF
BASOPHILS # BLD: 0.1 K/UL (ref 0–0.2)
BASOPHILS NFR BLD: 1 % (ref 0–2)
BILIRUB SERPL-MCNC: 0.5 MG/DL (ref 0.2–1.1)
BILIRUB UR QL: NEGATIVE
BUN SERPL-MCNC: 11 MG/DL (ref 8–23)
CALCIUM SERPL-MCNC: 9.2 MG/DL (ref 8.3–10.4)
CALCULATED R AXIS, ECG10: 58 DEGREES
CALCULATED T AXIS, ECG11: 74 DEGREES
CHLORIDE SERPL-SCNC: 101 MMOL/L (ref 98–107)
CO2 SERPL-SCNC: 25 MMOL/L (ref 21–32)
COLOR UR: YELLOW
CREAT SERPL-MCNC: 0.7 MG/DL (ref 0.6–1)
DIAGNOSIS, 93000: NORMAL
DIFFERENTIAL METHOD BLD: ABNORMAL
EOSINOPHIL # BLD: 0 K/UL (ref 0–0.8)
EOSINOPHIL NFR BLD: 0 % (ref 0.5–7.8)
ERYTHROCYTE [DISTWIDTH] IN BLOOD BY AUTOMATED COUNT: 13.9 % (ref 11.9–14.6)
EST. AVERAGE GLUCOSE BLD GHB EST-MCNC: 183 MG/DL
FERRITIN SERPL-MCNC: 87 NG/ML (ref 8–388)
FOLATE SERPL-MCNC: 14.1 NG/ML (ref 3.1–17.5)
GLOBULIN SER CALC-MCNC: 4.5 G/DL (ref 2.3–3.5)
GLUCOSE BLD STRIP.AUTO-MCNC: 269 MG/DL (ref 65–100)
GLUCOSE BLD STRIP.AUTO-MCNC: 92 MG/DL (ref 65–100)
GLUCOSE SERPL-MCNC: 156 MG/DL (ref 65–100)
GLUCOSE UR STRIP.AUTO-MCNC: >1000 MG/DL
HBA1C MFR BLD: 8 % (ref 4.2–6.3)
HCT VFR BLD AUTO: 36.2 % (ref 35.8–46.3)
HGB BLD-MCNC: 11.4 G/DL (ref 11.7–15.4)
HGB UR QL STRIP: ABNORMAL
IMM GRANULOCYTES # BLD AUTO: 0.1 K/UL (ref 0–0.5)
IMM GRANULOCYTES NFR BLD AUTO: 1 % (ref 0–5)
IRON SATN MFR SERPL: 8 %
IRON SERPL-MCNC: 18 UG/DL (ref 35–150)
IRON SERPL-MCNC: 19 UG/DL (ref 35–150)
KETONES UR QL STRIP.AUTO: 15 MG/DL
LEUKOCYTE ESTERASE UR QL STRIP.AUTO: NEGATIVE
LIPASE SERPL-CCNC: 38 U/L (ref 73–393)
LYMPHOCYTES # BLD: 1.1 K/UL (ref 0.5–4.6)
LYMPHOCYTES NFR BLD: 11 % (ref 13–44)
MAGNESIUM SERPL-MCNC: 1.7 MG/DL (ref 1.8–2.4)
MCH RBC QN AUTO: 28 PG (ref 26.1–32.9)
MCHC RBC AUTO-ENTMCNC: 31.5 G/DL (ref 31.4–35)
MCV RBC AUTO: 88.9 FL (ref 79.6–97.8)
MONOCYTES # BLD: 0.8 K/UL (ref 0.1–1.3)
MONOCYTES NFR BLD: 9 % (ref 4–12)
NEUTS SEG # BLD: 7.6 K/UL (ref 1.7–8.2)
NEUTS SEG NFR BLD: 79 % (ref 43–78)
NITRITE UR QL STRIP.AUTO: NEGATIVE
NRBC # BLD: 0 K/UL (ref 0–0.2)
PH UR STRIP: 6 [PH] (ref 5–9)
PLATELET # BLD AUTO: 179 K/UL (ref 150–450)
PMV BLD AUTO: 9.5 FL (ref 9.4–12.3)
POTASSIUM SERPL-SCNC: 4.1 MMOL/L (ref 3.5–5.1)
PROCALCITONIN SERPL-MCNC: 1.06 NG/ML (ref 0–0.49)
PROT SERPL-MCNC: 7.6 G/DL (ref 6.3–8.2)
PROT UR STRIP-MCNC: NEGATIVE MG/DL
Q-T INTERVAL, ECG07: 414 MS
QRS DURATION, ECG06: 82 MS
QTC CALCULATION (BEZET), ECG08: 594 MS
RBC # BLD AUTO: 4.07 M/UL (ref 4.05–5.2)
RBC #/AREA URNS HPF: ABNORMAL /HPF
SERVICE CMNT-IMP: ABNORMAL
SERVICE CMNT-IMP: NORMAL
SODIUM SERPL-SCNC: 136 MMOL/L (ref 136–145)
SP GR UR REFRACTOMETRY: 1.01 (ref 1–1.02)
TIBC SERPL-MCNC: 229 UG/DL (ref 250–450)
TROPONIN-HIGH SENSITIVITY: 3.2 PG/ML (ref 0–14)
TROPONIN-HIGH SENSITIVITY: 3.9 PG/ML (ref 0–14)
TSH SERPL DL<=0.005 MIU/L-ACNC: 3.32 UIU/ML (ref 0.36–3.74)
UROBILINOGEN UR QL STRIP.AUTO: 0.2 EU/DL (ref 0.2–1)
VENTRICULAR RATE, ECG03: 124 BPM
WBC # BLD AUTO: 9.6 K/UL (ref 4.3–11.1)
WBC URNS QL MICRO: ABNORMAL /HPF

## 2022-04-17 PROCEDURE — 71260 CT THORAX DX C+: CPT

## 2022-04-17 PROCEDURE — 74011636637 HC RX REV CODE- 636/637: Performed by: INTERNAL MEDICINE

## 2022-04-17 PROCEDURE — 80053 COMPREHEN METABOLIC PANEL: CPT

## 2022-04-17 PROCEDURE — 83690 ASSAY OF LIPASE: CPT

## 2022-04-17 PROCEDURE — 96365 THER/PROPH/DIAG IV INF INIT: CPT

## 2022-04-17 PROCEDURE — 81001 URINALYSIS AUTO W/SCOPE: CPT

## 2022-04-17 PROCEDURE — 74011000250 HC RX REV CODE- 250: Performed by: STUDENT IN AN ORGANIZED HEALTH CARE EDUCATION/TRAINING PROGRAM

## 2022-04-17 PROCEDURE — 83540 ASSAY OF IRON: CPT

## 2022-04-17 PROCEDURE — 84145 PROCALCITONIN (PCT): CPT

## 2022-04-17 PROCEDURE — 82962 GLUCOSE BLOOD TEST: CPT

## 2022-04-17 PROCEDURE — G0378 HOSPITAL OBSERVATION PER HR: HCPCS

## 2022-04-17 PROCEDURE — 82607 VITAMIN B-12: CPT

## 2022-04-17 PROCEDURE — 99285 EMERGENCY DEPT VISIT HI MDM: CPT

## 2022-04-17 PROCEDURE — 94762 N-INVAS EAR/PLS OXIMTRY CONT: CPT

## 2022-04-17 PROCEDURE — 74011000258 HC RX REV CODE- 258: Performed by: STUDENT IN AN ORGANIZED HEALTH CARE EDUCATION/TRAINING PROGRAM

## 2022-04-17 PROCEDURE — 84484 ASSAY OF TROPONIN QUANT: CPT

## 2022-04-17 PROCEDURE — 82728 ASSAY OF FERRITIN: CPT

## 2022-04-17 PROCEDURE — 83735 ASSAY OF MAGNESIUM: CPT

## 2022-04-17 PROCEDURE — 85025 COMPLETE CBC W/AUTO DIFF WBC: CPT

## 2022-04-17 PROCEDURE — 74011250636 HC RX REV CODE- 250/636: Performed by: STUDENT IN AN ORGANIZED HEALTH CARE EDUCATION/TRAINING PROGRAM

## 2022-04-17 PROCEDURE — 74011250637 HC RX REV CODE- 250/637: Performed by: INTERNAL MEDICINE

## 2022-04-17 PROCEDURE — 36415 COLL VENOUS BLD VENIPUNCTURE: CPT

## 2022-04-17 PROCEDURE — 74011000636 HC RX REV CODE- 636: Performed by: STUDENT IN AN ORGANIZED HEALTH CARE EDUCATION/TRAINING PROGRAM

## 2022-04-17 PROCEDURE — 96366 THER/PROPH/DIAG IV INF ADDON: CPT

## 2022-04-17 PROCEDURE — 84443 ASSAY THYROID STIM HORMONE: CPT

## 2022-04-17 PROCEDURE — 74011000250 HC RX REV CODE- 250: Performed by: INTERNAL MEDICINE

## 2022-04-17 PROCEDURE — 71046 X-RAY EXAM CHEST 2 VIEWS: CPT

## 2022-04-17 PROCEDURE — 96367 TX/PROPH/DG ADDL SEQ IV INF: CPT

## 2022-04-17 PROCEDURE — 93005 ELECTROCARDIOGRAM TRACING: CPT | Performed by: STUDENT IN AN ORGANIZED HEALTH CARE EDUCATION/TRAINING PROGRAM

## 2022-04-17 PROCEDURE — 87040 BLOOD CULTURE FOR BACTERIA: CPT

## 2022-04-17 PROCEDURE — 82746 ASSAY OF FOLIC ACID SERUM: CPT

## 2022-04-17 PROCEDURE — 83036 HEMOGLOBIN GLYCOSYLATED A1C: CPT

## 2022-04-17 RX ORDER — PANTOPRAZOLE SODIUM 40 MG/1
40 TABLET, DELAYED RELEASE ORAL DAILY
Status: DISCONTINUED | OUTPATIENT
Start: 2022-04-18 | End: 2022-04-18 | Stop reason: HOSPADM

## 2022-04-17 RX ORDER — SODIUM CHLORIDE 0.9 % (FLUSH) 0.9 %
5-10 SYRINGE (ML) INJECTION AS NEEDED
Status: DISCONTINUED | OUTPATIENT
Start: 2022-04-17 | End: 2022-04-18 | Stop reason: HOSPADM

## 2022-04-17 RX ORDER — POLYETHYLENE GLYCOL 3350 17 G/17G
17 POWDER, FOR SOLUTION ORAL DAILY PRN
Status: DISCONTINUED | OUTPATIENT
Start: 2022-04-17 | End: 2022-04-18 | Stop reason: HOSPADM

## 2022-04-17 RX ORDER — MELATONIN
2000
Status: DISCONTINUED | OUTPATIENT
Start: 2022-04-17 | End: 2022-04-18 | Stop reason: HOSPADM

## 2022-04-17 RX ORDER — SODIUM CHLORIDE 0.9 % (FLUSH) 0.9 %
10 SYRINGE (ML) INJECTION
Status: COMPLETED | OUTPATIENT
Start: 2022-04-17 | End: 2022-04-17

## 2022-04-17 RX ORDER — UREA 10 %
100 LOTION (ML) TOPICAL DAILY
COMMUNITY

## 2022-04-17 RX ORDER — ASPIRIN 81 MG/1
81 TABLET ORAL
Status: DISCONTINUED | OUTPATIENT
Start: 2022-04-17 | End: 2022-04-18 | Stop reason: HOSPADM

## 2022-04-17 RX ORDER — LOSARTAN POTASSIUM 50 MG/1
25 TABLET ORAL DAILY
Status: DISCONTINUED | OUTPATIENT
Start: 2022-04-18 | End: 2022-04-18 | Stop reason: HOSPADM

## 2022-04-17 RX ORDER — SODIUM CHLORIDE 0.9 % (FLUSH) 0.9 %
5-40 SYRINGE (ML) INJECTION EVERY 8 HOURS
Status: DISCONTINUED | OUTPATIENT
Start: 2022-04-17 | End: 2022-04-18 | Stop reason: HOSPADM

## 2022-04-17 RX ORDER — ENOXAPARIN SODIUM 100 MG/ML
40 INJECTION SUBCUTANEOUS DAILY
Status: DISCONTINUED | OUTPATIENT
Start: 2022-04-18 | End: 2022-04-18 | Stop reason: HOSPADM

## 2022-04-17 RX ORDER — MAGNESIUM SULFATE HEPTAHYDRATE 40 MG/ML
2 INJECTION, SOLUTION INTRAVENOUS
Status: COMPLETED | OUTPATIENT
Start: 2022-04-17 | End: 2022-04-17

## 2022-04-17 RX ORDER — ACETAMINOPHEN 650 MG/1
650 SUPPOSITORY RECTAL
Status: DISCONTINUED | OUTPATIENT
Start: 2022-04-17 | End: 2022-04-18 | Stop reason: HOSPADM

## 2022-04-17 RX ORDER — SODIUM CHLORIDE 0.9 % (FLUSH) 0.9 %
5-10 SYRINGE (ML) INJECTION EVERY 8 HOURS
Status: DISCONTINUED | OUTPATIENT
Start: 2022-04-17 | End: 2022-04-18 | Stop reason: HOSPADM

## 2022-04-17 RX ORDER — DULOXETIN HYDROCHLORIDE 30 MG/1
30 CAPSULE, DELAYED RELEASE ORAL DAILY
Status: DISCONTINUED | OUTPATIENT
Start: 2022-04-18 | End: 2022-04-18 | Stop reason: HOSPADM

## 2022-04-17 RX ORDER — INSULIN LISPRO 100 [IU]/ML
INJECTION, SOLUTION INTRAVENOUS; SUBCUTANEOUS
Status: DISCONTINUED | OUTPATIENT
Start: 2022-04-17 | End: 2022-04-18 | Stop reason: HOSPADM

## 2022-04-17 RX ORDER — ESTRADIOL 1 MG/1
1 TABLET ORAL
Status: DISCONTINUED | OUTPATIENT
Start: 2022-04-17 | End: 2022-04-18 | Stop reason: HOSPADM

## 2022-04-17 RX ORDER — ONDANSETRON 2 MG/ML
4 INJECTION INTRAMUSCULAR; INTRAVENOUS
Status: DISCONTINUED | OUTPATIENT
Start: 2022-04-17 | End: 2022-04-18 | Stop reason: HOSPADM

## 2022-04-17 RX ORDER — ACETAMINOPHEN 325 MG/1
650 TABLET ORAL
Status: DISCONTINUED | OUTPATIENT
Start: 2022-04-17 | End: 2022-04-18 | Stop reason: HOSPADM

## 2022-04-17 RX ORDER — FEXOFENADINE HCL AND PSEUDOEPHEDRINE HCI 180; 240 MG/1; MG/1
1 TABLET, EXTENDED RELEASE ORAL DAILY
COMMUNITY
End: 2022-05-04

## 2022-04-17 RX ORDER — SODIUM CHLORIDE 0.9 % (FLUSH) 0.9 %
5-40 SYRINGE (ML) INJECTION AS NEEDED
Status: DISCONTINUED | OUTPATIENT
Start: 2022-04-17 | End: 2022-04-18 | Stop reason: HOSPADM

## 2022-04-17 RX ORDER — ONDANSETRON 4 MG/1
4 TABLET, ORALLY DISINTEGRATING ORAL
Status: DISCONTINUED | OUTPATIENT
Start: 2022-04-17 | End: 2022-04-18 | Stop reason: HOSPADM

## 2022-04-17 RX ORDER — ROSUVASTATIN CALCIUM 10 MG/1
20 TABLET, COATED ORAL
Status: DISCONTINUED | OUTPATIENT
Start: 2022-04-17 | End: 2022-04-18 | Stop reason: HOSPADM

## 2022-04-17 RX ORDER — METOPROLOL SUCCINATE 50 MG/1
50 TABLET, EXTENDED RELEASE ORAL DAILY
Status: DISCONTINUED | OUTPATIENT
Start: 2022-04-18 | End: 2022-04-18 | Stop reason: HOSPADM

## 2022-04-17 RX ADMIN — TICAGRELOR 90 MG: 90 TABLET ORAL at 17:19

## 2022-04-17 RX ADMIN — SODIUM CHLORIDE 1000 ML: 900 INJECTION, SOLUTION INTRAVENOUS at 13:28

## 2022-04-17 RX ADMIN — Medication 10 ML: at 11:21

## 2022-04-17 RX ADMIN — CEFTRIAXONE 1 G: 1 INJECTION, POWDER, FOR SOLUTION INTRAMUSCULAR; INTRAVENOUS at 14:03

## 2022-04-17 RX ADMIN — IOPAMIDOL 100 ML: 755 INJECTION, SOLUTION INTRAVENOUS at 11:21

## 2022-04-17 RX ADMIN — INSULIN LISPRO 6 UNITS: 100 INJECTION, SOLUTION INTRAVENOUS; SUBCUTANEOUS at 17:18

## 2022-04-17 RX ADMIN — ROSUVASTATIN CALCIUM 20 MG: 10 TABLET, FILM COATED ORAL at 21:30

## 2022-04-17 RX ADMIN — SODIUM CHLORIDE, PRESERVATIVE FREE 10 ML: 5 INJECTION INTRAVENOUS at 21:33

## 2022-04-17 RX ADMIN — VITAMIN D, TAB 1000IU (100/BT) 2000 UNITS: 25 TAB at 21:30

## 2022-04-17 RX ADMIN — SODIUM CHLORIDE 100 ML: 9 INJECTION, SOLUTION INTRAVENOUS at 11:21

## 2022-04-17 RX ADMIN — ASPIRIN 81 MG: 81 TABLET ORAL at 21:30

## 2022-04-17 RX ADMIN — ESTRADIOL 1 MG: 1 TABLET ORAL at 21:30

## 2022-04-17 RX ADMIN — SODIUM CHLORIDE, PRESERVATIVE FREE 10 ML: 5 INJECTION INTRAVENOUS at 17:20

## 2022-04-17 RX ADMIN — MAGNESIUM SULFATE HEPTAHYDRATE 2 G: 40 INJECTION, SOLUTION INTRAVENOUS at 11:36

## 2022-04-17 RX ADMIN — SODIUM CHLORIDE, PRESERVATIVE FREE 10 ML: 5 INJECTION INTRAVENOUS at 17:19

## 2022-04-17 NOTE — PROGRESS NOTES
TRANSFER - IN REPORT:    Verbal report received from [facility] on Gabo Paula  being received from ED for routine progression of care      Report consisted of patients Situation, Background, Assessment and   Recommendations(SBAR). Information from the following report(s) SBAR was reviewed with the receiving nurse. Opportunity for questions and clarification was provided. Assessment completed upon patients arrival to unit and care assumed.

## 2022-04-17 NOTE — PROGRESS NOTES
04/17/22 1517   Dual Skin Pressure Injury Assessment   Dual Skin Pressure Injury Assessment WDL   Second Care Provider (Based on [address] [place] [address]) Zak Lund RN    Skin Integumentary   Skin Integumentary (WDL) X    Pressure  Injury Documentation No Pressure Injury Noted-Pressure Ulcer Prevention Initiated   Skin Color Ecchymosis (comment)  (BUE )   Skin Condition/Temp Dry;Flaky;Fragile   Skin Integrity Intact   Turgor Atrophic   Hair Growth Present   Wound Prevention and Protection Methods   Orientation of Wound Prevention Posterior   Location of Wound Prevention Sacrum/Coccyx   Dressing Present  No   Wound Offloading (Prevention Methods) Bed, pressure reduction mattress

## 2022-04-17 NOTE — ED NOTES
TRANSFER - OUT REPORT:    Verbal report given to receiving nurse on Valerie Joseph  being transferred to medical unit for urgent transfer       Report consisted of patients Situation, Background, Assessment and   Recommendations(SBAR). Information from the following report(s) SBAR was reviewed with the receiving nurse. Lines:   Peripheral IV 04/17/22 Left Antecubital (Active)   Site Assessment Clean, dry, & intact 04/17/22 1047   Phlebitis Assessment 0 04/17/22 1047   Infiltration Assessment 0 04/17/22 1047   Dressing Status Clean, dry, & intact 04/17/22 1047       Peripheral IV 04/17/22 Anterior;Proximal;Right Forearm (Active)       Peripheral IV 04/17/22 Anterior; Left Wrist (Active)        Opportunity for questions and clarification was provided.       Patient transported with:   stretcher and family

## 2022-04-17 NOTE — ED TRIAGE NOTES
Pt arrives ambulatory to triage in [facility]. Arrives with multiple complaints. Pt states pain for 2 days in left breast, her right ear, left side of back, and face. Also reports chills at home. Has not checked her temperature. States her apple watch has been giving her heart rates of 100-170 in the past few days. Pt states she does not even feel her heart beat. Then reports she felt some palpitations this AM after she napped. Masked.

## 2022-04-17 NOTE — ED PROVIDER NOTES
61-year-old female presents to the emerge department  bedside. Patient complains of elevated heart rate and intermittent left-sided chest pain. She reports in the last 2 days she has not Apple Watch and it has noted her heart rate to be elevated. She reports episodes of heart rates in the 120s. Reports her shortness of breath as well as worsening chest pain with ambulation. States when she rests the chest pain does not typically occur as frequently. Reports left-sided chest pain as well as intermittent episodes of left upper back pain. Described as sharp and stabbing in nature. States it last approximately 5 seconds and then resolves. Occasionally occurring every 5 to 10 minutes. States this has been constant for the last 2 days. During this timeframe she is also noted to have elevated heart rate she is up into the 120s. Also reports episodes of heart rate into the 170s this was read by her apple watch.  reports his Fitbit was placed on the patient during an episode of elevated heart rate and his Fitbit read normal.  Unclear if patient's heart was actually 170s. Patient reports compliance with her medications. Denies swelling or pain in lower extremities. Denies history of DVT, PEs or cancer. Patient is on estrogen therapy as well as metoprolol and reports compliance with her beta-blocker. Does have history of CAD. Denies shortness of breath, vomiting diaphoresis    Patient also reports nasal congestion and right ear pain along with frontal headache. Does have history of headaches occasionally. Does have history of allergies and has been taking her Claritin as directed.            Past Medical History:   Diagnosis Date    GERD (gastroesophageal reflux disease)     Hypercholesterolemia     Type 2 diabetes mellitus ([facility] [address]        Past Surgical History:   Procedure Laterality Date    HX APPENDECTOMY      HX CHOLECYSTECTOMY      HX HYSTERECTOMY      ovaries removed    HX PTCA times 2         Family History:   Problem Relation Age of Onset    Diabetes Mother     Diabetes Maternal Grandfather     Coronary Art Dis Neg Hx        Social History     Socioeconomic History    Marital status:      Spouse name: Not on file    Number of children: Not on file    Years of education: Not on file    Highest education level: Not on file   Occupational History    Not on file   Tobacco Use    Smoking status: Former Smoker     Packs/day: 1.00     Years: 45.00     Pack years: 45.00     Quit date: 2021     Years since quittin.8    Smokeless tobacco: Never Used   Substance and Sexual Activity    Alcohol use: Not Currently    Drug use: Not Currently    Sexual activity: Not on file   Other Topics Concern    Not on file   Social History Narrative    Not on file     Social Determinants of Health     Financial Resource Strain:     Difficulty of Paying Living Expenses: Not on file   Food Insecurity:     Worried About [address] in the Last Year: Not on file    [address] in the Last Year: Not on file   Transportation Needs:     Lack of Transportation (Medical): Not on file    Lack of Transportation (Non-Medical):  Not on file   Physical Activity:     Days of Exercise per Week: Not on file    Minutes of Exercise per Session: Not on file   Stress:     Feeling of Stress : Not on file   Social Connections:     Frequency of Communication with Friends and Family: Not on file    Frequency of Social Gatherings with Friends and Family: Not on file    Attends Yarsanism Services: Not on file    Active Member of Clubs or Organizations: Not on file    Attends Club or Organization Meetings: Not on file    Marital Status: Not on file   Intimate Partner Violence:     Fear of Current or Ex-Partner: Not on file    Emotionally Abused: Not on file    Physically Abused: Not on file    Sexually Abused: Not on file   Housing Stability:     Unable to Pay for Housing in the Last Year: Not on file    Number of Places Lived in the Last Year: Not on file    Unstable Housing in the Last Year: Not on file         ALLERGIES: Patient has no known allergies. Review of Systems   Constitutional: Negative for chills and fever. HENT: Positive for congestion and sinus pressure. Negative for sore throat. Eyes: Negative for visual disturbance. Respiratory: Positive for shortness of breath. Negative for cough. Cardiovascular: Positive for chest pain. Gastrointestinal: Positive for nausea. Negative for abdominal pain, diarrhea and vomiting. Endocrine: Negative for polyuria. Genitourinary: Negative for difficulty urinating and dysuria. Musculoskeletal: Negative for neck pain and neck stiffness. Skin: Negative for rash. Neurological: Negative for weakness and headaches. Psychiatric/Behavioral: Negative for confusion. All other systems reviewed and are negative. Vitals:    04/17/22 1017 04/17/22 1038   BP: (!) 103/50    Pulse: (!) 123    Resp: 16    Temp: 98.2 °F (36.8 °C)    SpO2: 99% 99%   Weight: 73.5 kg (162 lb)    Height: 5' 6\" (1.676 m)             Physical Exam  Vitals and nursing note reviewed. Constitutional:       Appearance: Normal appearance. She is not ill-appearing or toxic-appearing. HENT:      Head: Normocephalic and atraumatic. Right Ear: A middle ear effusion is present. Tympanic membrane is not erythematous. Left Ear: Tympanic membrane is not erythematous. Nose: Nose normal.      Mouth/Throat:      Mouth: Mucous membranes are moist.   Eyes:      Extraocular Movements: Extraocular movements intact. Cardiovascular:      Rate and Rhythm: Regular rhythm. Tachycardia present. Pulses: Normal pulses. Heart sounds: Normal heart sounds. Pulmonary:      Effort: Pulmonary effort is normal. No respiratory distress. Breath sounds: Normal breath sounds. Abdominal:      General: Abdomen is flat. There is no distension. Palpations: Abdomen is soft. Tenderness: There is no abdominal tenderness. Musculoskeletal:         General: No tenderness. Normal range of motion. Cervical back: Normal range of motion. No rigidity. Right lower leg: No edema. Left lower leg: No edema. Skin:     General: Skin is warm and dry. Neurological:      General: No focal deficit present. Mental Status: She is alert and oriented to person, place, and time. Psychiatric:         Mood and Affect: Mood normal.          MDM  Number of Diagnoses or Management Options  Elevated procalcitonin  Tachycardia  Diagnosis management comments: 61-year-old female presents ER with elevated heart rate. Tachycardic on arrival with heart rate in the 120s. A broad-based work-up was ordered. Lab work shows normal white count, stable H&H, normal troponin, normal electrolytes and kidney function, normal liver enzymes and normal lipase. Magnesium is 1.7, will give 2 g IV magnesium. TSH is normal.  Chest x-ray without any emergent findings. CT scan of his chest was ordered given her chest pain and tachycardia. CT did not reveal any evidence of PE, did show lymph nodes are nonspecific. Patient was informed of these findings and radiologist recommendation for repeat imaging. Procalcitonin was found to be elevated at 1.06. Patient does have history of urinary tract infections, UA is normal today. No evidence of infection but given patient's tachycardia and procalcitonin, will treat with IV fluid as well as Rocephin. Blood cultures obtained. I then spoke with the hospitalist who agreed to admit this patient for continued evaluation and treatment. Patient voiced understanding and agreement with this plan. EKG interpretation shows tacky arrhythmia, without discernible P waves, rate 124, QRS 82, QTc 594, prolonged QTC, normal axis, Q waves noted inferiorly, no significant ST elevation or depression.        Amount and/or Complexity of Data Reviewed  Clinical lab tests: reviewed and ordered  Tests in the radiology section of CPT®: ordered and reviewed  Discussion of test results with the performing providers: yes  Decide to obtain previous medical records or to obtain history from someone other than the patient: yes  Review and summarize past medical records: yes  Discuss the patient with other providers: yes  Independent visualization of images, tracings, or specimens: yes    Risk of Complications, Morbidity, and/or Mortality  Presenting problems: moderate  Diagnostic procedures: moderate  Management options: moderate           Procedures

## 2022-04-17 NOTE — PROGRESS NOTES
Problem: Falls - Risk of  Goal: *Absence of Falls  Description: Document Brooklynn Thakur Fall Risk and appropriate interventions in the flowsheet. Outcome: Progressing Towards Goal  Note: Fall Risk Interventions:            Medication Interventions: Evaluate medications/consider consulting pharmacy,Patient to call before getting OOB,Teach patient to arise slowly         History of Falls Interventions: Consult care management for discharge planning,Investigate reason for fall,Room close to nurse's station         Problem: Patient Education: Go to Patient Education Activity  Goal: Patient/Family Education  Outcome: Progressing Towards Goal     Problem: Diabetes Self-Management  Goal: *Disease process and treatment process  Description: Define diabetes and identify own type of diabetes; list 3 options for treating diabetes. Outcome: Progressing Towards Goal  Goal: *Incorporating nutritional management into lifestyle  Description: Describe effect of type, amount and timing of food on blood glucose; list 3 methods for planning meals. Outcome: Progressing Towards Goal  Goal: *Incorporating physical activity into lifestyle  Description: State effect of exercise on blood glucose levels. Outcome: Progressing Towards Goal  Goal: *Developing strategies to promote health/change behavior  Description: Define the ABC's of diabetes; identify appropriate screenings, schedule and personal plan for screenings. Outcome: Progressing Towards Goal  Goal: *Using medications safely  Description: State effect of diabetes medications on diabetes; name diabetes medication taking, action and side effects. Outcome: Progressing Towards Goal  Goal: *Monitoring blood glucose, interpreting and using results  Description: Identify recommended blood glucose targets  and personal targets.   Outcome: Progressing Towards Goal  Goal: *Prevention, detection, treatment of acute complications  Description: List symptoms of hyper- and hypoglycemia; describe how to treat low blood sugar and actions for lowering  high blood glucose level. Outcome: Progressing Towards Goal  Goal: *Prevention, detection and treatment of chronic complications  Description: Define the natural course of diabetes and describe the relationship of blood glucose levels to long term complications of diabetes.   Outcome: Progressing Towards Goal  Goal: *Developing strategies to address psychosocial issues  Description: Describe feelings about living with diabetes; identify support needed and support network  Outcome: Progressing Towards Goal  Goal: *Insulin pump training  Outcome: Progressing Towards Goal  Goal: *Sick day guidelines  Outcome: Progressing Towards Goal  Goal: *Patient Specific Goal (EDIT GOAL, INSERT TEXT)  Outcome: Progressing Towards Goal

## 2022-04-18 VITALS
DIASTOLIC BLOOD PRESSURE: 54 MMHG | HEART RATE: 100 BPM | OXYGEN SATURATION: 99 % | SYSTOLIC BLOOD PRESSURE: 117 MMHG | WEIGHT: 162 LBS | RESPIRATION RATE: 16 BRPM | BODY MASS INDEX: 26.03 KG/M2 | HEIGHT: 66 IN | TEMPERATURE: 97.7 F

## 2022-04-18 LAB
ALBUMIN SERPL-MCNC: 2.6 G/DL (ref 3.2–4.6)
ALBUMIN/GLOB SERPL: 0.6 {RATIO} (ref 1.2–3.5)
ALP SERPL-CCNC: 110 U/L (ref 50–136)
ALT SERPL-CCNC: 16 U/L (ref 12–65)
ANION GAP SERPL CALC-SCNC: 11 MMOL/L (ref 7–16)
AST SERPL-CCNC: 9 U/L (ref 15–37)
ATRIAL RATE: 98 BPM
BASOPHILS # BLD: 0.1 K/UL (ref 0–0.2)
BASOPHILS NFR BLD: 1 % (ref 0–2)
BILIRUB SERPL-MCNC: 0.3 MG/DL (ref 0.2–1.1)
BUN SERPL-MCNC: 10 MG/DL (ref 8–23)
CALCIUM SERPL-MCNC: 8.8 MG/DL (ref 8.3–10.4)
CALCULATED P AXIS, ECG09: 56 DEGREES
CALCULATED R AXIS, ECG10: 48 DEGREES
CALCULATED T AXIS, ECG11: 71 DEGREES
CHLORIDE SERPL-SCNC: 104 MMOL/L (ref 98–107)
CO2 SERPL-SCNC: 23 MMOL/L (ref 21–32)
CREAT SERPL-MCNC: 0.4 MG/DL (ref 0.6–1)
DIAGNOSIS, 93000: NORMAL
DIFFERENTIAL METHOD BLD: ABNORMAL
EOSINOPHIL # BLD: 0.3 K/UL (ref 0–0.8)
EOSINOPHIL NFR BLD: 4 % (ref 0.5–7.8)
ERYTHROCYTE [DISTWIDTH] IN BLOOD BY AUTOMATED COUNT: 14 % (ref 11.9–14.6)
GLOBULIN SER CALC-MCNC: 4.3 G/DL (ref 2.3–3.5)
GLUCOSE BLD STRIP.AUTO-MCNC: 128 MG/DL (ref 65–100)
GLUCOSE BLD STRIP.AUTO-MCNC: 141 MG/DL (ref 65–100)
GLUCOSE SERPL-MCNC: 117 MG/DL (ref 65–100)
HCT VFR BLD AUTO: 31.4 % (ref 35.8–46.3)
HGB BLD-MCNC: 10.2 G/DL (ref 11.7–15.4)
HGB RETIC QN AUTO: 29 PG (ref 29–35)
IMM GRANULOCYTES # BLD AUTO: 0.1 K/UL (ref 0–0.5)
IMM GRANULOCYTES NFR BLD AUTO: 1 % (ref 0–5)
IMM RETICS NFR: 28.3 % (ref 3–15.9)
LDH SERPL L TO P-CCNC: 160 U/L (ref 100–190)
LYMPHOCYTES # BLD: 1.3 K/UL (ref 0.5–4.6)
LYMPHOCYTES NFR BLD: 18 % (ref 13–44)
MAGNESIUM SERPL-MCNC: 2.2 MG/DL (ref 1.8–2.4)
MCH RBC QN AUTO: 28.3 PG (ref 26.1–32.9)
MCHC RBC AUTO-ENTMCNC: 32.5 G/DL (ref 31.4–35)
MCV RBC AUTO: 87.2 FL (ref 79.6–97.8)
MONOCYTES # BLD: 0.9 K/UL (ref 0.1–1.3)
MONOCYTES NFR BLD: 12 % (ref 4–12)
NEUTS SEG # BLD: 4.9 K/UL (ref 1.7–8.2)
NEUTS SEG NFR BLD: 65 % (ref 43–78)
NRBC # BLD: 0 K/UL (ref 0–0.2)
P-R INTERVAL, ECG05: 238 MS
PLATELET # BLD AUTO: 209 K/UL (ref 150–450)
PMV BLD AUTO: 10 FL (ref 9.4–12.3)
POTASSIUM SERPL-SCNC: 3.9 MMOL/L (ref 3.5–5.1)
PROCALCITONIN SERPL-MCNC: 0.69 NG/ML (ref 0–0.49)
PROT SERPL-MCNC: 6.9 G/DL (ref 6.3–8.2)
Q-T INTERVAL, ECG07: 352 MS
QRS DURATION, ECG06: 90 MS
QTC CALCULATION (BEZET), ECG08: 449 MS
RBC # BLD AUTO: 3.6 M/UL (ref 4.05–5.2)
RETICS # AUTO: 0.06 M/UL (ref 0.03–0.1)
RETICS/RBC NFR AUTO: 1.7 % (ref 0.3–2)
SERVICE CMNT-IMP: ABNORMAL
SERVICE CMNT-IMP: ABNORMAL
SODIUM SERPL-SCNC: 138 MMOL/L (ref 136–145)
VENTRICULAR RATE, ECG03: 98 BPM
VIT B12 SERPL-MCNC: 2985 PG/ML (ref 193–986)
WBC # BLD AUTO: 7.6 K/UL (ref 4.3–11.1)

## 2022-04-18 PROCEDURE — 74011000250 HC RX REV CODE- 250: Performed by: INTERNAL MEDICINE

## 2022-04-18 PROCEDURE — 99244 OFF/OP CNSLTJ NEW/EST MOD 40: CPT | Performed by: INTERNAL MEDICINE

## 2022-04-18 PROCEDURE — 83010 ASSAY OF HAPTOGLOBIN QUANT: CPT

## 2022-04-18 PROCEDURE — 36415 COLL VENOUS BLD VENIPUNCTURE: CPT

## 2022-04-18 PROCEDURE — 74011000250 HC RX REV CODE- 250: Performed by: STUDENT IN AN ORGANIZED HEALTH CARE EDUCATION/TRAINING PROGRAM

## 2022-04-18 PROCEDURE — 85025 COMPLETE CBC W/AUTO DIFF WBC: CPT

## 2022-04-18 PROCEDURE — 96372 THER/PROPH/DIAG INJ SC/IM: CPT

## 2022-04-18 PROCEDURE — 74011250636 HC RX REV CODE- 250/636: Performed by: INTERNAL MEDICINE

## 2022-04-18 PROCEDURE — 85046 RETICYTE/HGB CONCENTRATE: CPT

## 2022-04-18 PROCEDURE — G0378 HOSPITAL OBSERVATION PER HR: HCPCS

## 2022-04-18 PROCEDURE — 83615 LACTATE (LD) (LDH) ENZYME: CPT

## 2022-04-18 PROCEDURE — 93005 ELECTROCARDIOGRAM TRACING: CPT | Performed by: INTERNAL MEDICINE

## 2022-04-18 PROCEDURE — 74011250637 HC RX REV CODE- 250/637: Performed by: INTERNAL MEDICINE

## 2022-04-18 PROCEDURE — 80053 COMPREHEN METABOLIC PANEL: CPT

## 2022-04-18 PROCEDURE — 83735 ASSAY OF MAGNESIUM: CPT

## 2022-04-18 PROCEDURE — 82962 GLUCOSE BLOOD TEST: CPT

## 2022-04-18 PROCEDURE — 84145 PROCALCITONIN (PCT): CPT

## 2022-04-18 RX ORDER — NITROFURANTOIN 25; 75 MG/1; MG/1
100 CAPSULE ORAL 2 TIMES DAILY
Qty: 4 CAPSULE | Refills: 0 | Status: SHIPPED | OUTPATIENT
Start: 2022-04-18 | End: 2022-04-18 | Stop reason: SDUPTHER

## 2022-04-18 RX ORDER — NITROFURANTOIN 25; 75 MG/1; MG/1
100 CAPSULE ORAL 2 TIMES DAILY
Qty: 4 CAPSULE | Refills: 0 | Status: SHIPPED | OUTPATIENT
Start: 2022-04-18 | End: 2022-04-20

## 2022-04-18 RX ADMIN — SODIUM CHLORIDE, PRESERVATIVE FREE 10 ML: 5 INJECTION INTRAVENOUS at 06:05

## 2022-04-18 RX ADMIN — METOPROLOL SUCCINATE 50 MG: 50 TABLET, EXTENDED RELEASE ORAL at 09:59

## 2022-04-18 RX ADMIN — TICAGRELOR 90 MG: 90 TABLET ORAL at 06:04

## 2022-04-18 RX ADMIN — PANTOPRAZOLE SODIUM 40 MG: 40 TABLET, DELAYED RELEASE ORAL at 09:59

## 2022-04-18 RX ADMIN — ENOXAPARIN SODIUM 40 MG: 40 INJECTION SUBCUTANEOUS at 09:59

## 2022-04-18 RX ADMIN — ACETAMINOPHEN 650 MG: 325 TABLET ORAL at 10:09

## 2022-04-18 RX ADMIN — LOSARTAN POTASSIUM 25 MG: 50 TABLET, FILM COATED ORAL at 09:59

## 2022-04-18 NOTE — DISCHARGE SUMMARY
Hospitalist Discharge Summary     Patient ID:  Aissatou Morillo  [id number]  15 y.o.  1960  Admit date: 4/17/2022 10:29 AM  Discharge date and time: 4/18/2022  Attending: No att. providers found  PCP:  Leslie Wilson MD  Treatment Team: Consulting Provider: VALERIE James; Hospitalist: Joanne Leonardo DO; Charge Nurse: Andrew Lux; Utilization Review: Marietta Dumont RN; Physician: Kj De La Rosa MD    Principal Diagnosis SIRS (systemic inflammatory response syndrome) ([facility] [address]   Principal Problem:    SIRS (systemic inflammatory response syndrome) ([address] (4/17/2022)    Active Problems:    CAD in native artery (7/1/2021)      Ischemic cardiomyopathy (6/30/2021)      Overview: Added automatically from request for surgery [id number]      Diabetes mellitus ([facility] [address] ()      Hyperlipidemia ()      Hypercholesterolemia ()      Type 2 diabetes mellitus ([facility] [address] ()       Aissatou Morillo is a 62 y.o. female with medical history of CAD s/p PCI, DM2, HLP, who presented with concerns of elevated heart rates. Patient wears an apple watch and says her pulse has been as high as 170's and ranging consistently in 120-130's. She reports some left sided chest pain that is sharp and intermittent and usually occurring during episodes of tachycardia as reported by her smart watch. Sometimes with exertion (she has been mulching the yard) but also with rest.  She reports she has otherwise been feeling her usual self and denies feeling a racing heart. Does feel occasional palpitations that last only a few seconds. Reports chills but uncertain if she has had fever. Intermittent nausea no vomiting or diarrhea. [place] she has a trip planned on Tuesday and just wanted \"checked out\". Of note she decreased her Toprol from 100mg to 50mg on 3/10/22 with agreement by cardiology due to concerns of fatigue as s/e and reports she has had more energy.    She was also started on macrobid x 10 days on 3/13 for concern of recurrent UTI.      ER workup notable for -123, /81   benign CBC apart from hgb 11.4 (previous was 13)  Benign BMP  Procal 1.06 minimally elevated     Procal 1.06  Troponin low at 3 and flat     CT chest w/o evidence of PE. Does show \"few bilateral subpleural nodules- consider f/u CT in 1 year if at high risk for malignancy. \"     ER request HM to admit for concerns of SIRS (tachypnea, tachycardia). Pt was treated with Rocephin in ED for concern of possible sepsis but UA clear. Interval History (4/18): Patient examined at bedside. No acute overnight events. Feels well and in good spirits overall. Denies chest pain, heart palpitations, skipped beats, shortness of breath, abdominal pain, nausea/vomiting. Hospital Course:  Please refer to the admission H&P for details of presentation. In summary, the patient is admitted for concerns for SVT after observing HRs as high as the 170s on her Apple Watch. Elevated procalcitonin of 1.0 and meeting SIRS criteria but no obvious infectious etiology. However, patient does have a history of recurrent UTIs and she is currently on SGLT2 inhibitor which can further increase her risk. Started empirically on CTX, will finish duration of treatment to cover for uncomplicated cystitis. Evaluated by cardiology with no concern for active cardiac issue. She is currently hemodynamically stable for hospital discharge with outpatient follow-up. Details of hospitalization discussed with patient and her  at bedside who both understand and agree with plan of care and discharge home. Return precautions provided. All questions answered. Significant Diagnostic Studies:     CT chest with contrast (pulmonary embolism protocol)     History: Pt arrives ambulatory to triage in [facility]. Arrives with multiple  complaints. Pt states pain for 2 days in left breast, her right ear, left side  of back, and face. Also reports chills at home. Has not checked her temperature.   States her apple watch has been -giving her heart rates of 100-170 in the past  few days. Pt states she does not even feel her heart beat. Then reports she felt  some palpitations this AM after she napped. Masked.      Technique: Helically acquired images were obtained from the lung apices to the  domes of the diaphragms reconstructed at 2.5mm intervals after the uneventful  administration of 100 cc's intravenous Isovue-370 in order to evaluate the  pulmonary arteries. Coronal and sagittal reformatted images were submitted.     Radiation dose reduction techniques were used for this study:  Our CT scanners  use one or all of the following: Automated exposure control, adjustment of the  mA and/or kVp according to patient's size, iterative reconstruction.     Comparison: None. Correlation is made to the chest x-ray performed earlier on  the same day.     Findings: There is no pleural or pericardial effusion although a small portion  of the lungs are excluded at the costophrenic angles posteriorly. The heart and  great vessels are unremarkable. There are no filling defects within the  pulmonary arteries to suggest pulmonary emboli. There are no airspace opacities. There is a 3 mm subpleural pulmonary nodule within the right lower lobe on image  64, series 1. A smaller finding is also present within the right lower lobe on  the same image. A 3 mm subpleural nodules present within the left lower lobe on  image 74. No adenopathy is present within the thorax. Imaging of the upper  abdomen is unremarkable.     IMPRESSION  1. No evidence of pulmonary embolism. 2. Few bilateral subpleural nodules. If the patient is considered high-risk for  malignancy, follow-up CT scanning one year would be recommended. Otherwise no  follow-up imaging is necessary. Two view chest     History: ED pt-Arrives with multiple complaints. Pt states pain for 2 days in  left breast, her right ear, left side of back, and face. Also reports chills at  home. Has not checked her temperature. States her apple watch has been giving  her heart rates of 100-170 in -the past few days. Pt states she does not even  feel her heart beat. Then reports she felt some palpitations this AM after she  napped.     Comparison: None     Findings: The heart is normal in size and configuration. The lungs and pleural  spaces are clear. The pulmonary vascularity is within normal limits. The  visualized osseous structures are unremarkable.     IMPRESSION  No active disease in the chest.       Labs: Results:       Chemistry Recent Labs     04/18/22  0440 04/17/22  1023   * 156*    136   K 3.9 4.1    101   CO2 23 25   BUN 10 11   CREA 0.40* 0.70   CA 8.8 9.2   AGAP 11 10    128   TP 6.9 7.6   ALB 2.6* 3.1*   GLOB 4.3* 4.5*   AGRAT 0.6* 0.7*      CBC w/Diff Recent Labs     04/18/22  0440 04/17/22  1023   WBC 7.6 9.6   RBC 3.60* 4.07   HGB 10.2* 11.4*   HCT 31.4* 36.2    179   GRANS 65 79*   LYMPH 18 11*   EOS 4 0*      Cardiac Enzymes No results for input(s): CPK, CKND1, CATERINA in the last 72 hours. No lab exists for component: CKRMB, TROIP   Coagulation No results for input(s): PTP, INR, APTT, INREXT in the last 72 hours. Lipid Panel Lab Results   Component Value Date/Time    Cholesterol, total 245 (H) 08/09/2010 10:51 AM    HDL Cholesterol 54 08/09/2010 10:51 AM    LDL, calculated 155.2 (H) 08/09/2010 10:51 AM    VLDL, calculated 35.8 08/09/2010 10:51 AM    Triglyceride 179 (H) 08/09/2010 10:51 AM    CHOL/HDL Ratio 4.5 08/09/2010 10:51 AM      BNP No results for input(s): BNPP in the last 72 hours.    Liver Enzymes Recent Labs     04/18/22  0440   TP 6.9   ALB 2.6*         Thyroid Studies Lab Results   Component Value Date/Time    TSH 3.320 04/17/2022 10:22 AM            Discharge Exam:  Visit Vitals  BP (!) 117/54 (BP 1 Location: Left upper arm, BP Patient Position: Sitting)   Pulse 100   Temp 97.7 °F (36.5 °C)   Resp 16   Ht 5' 6\" (1.676 m)   Wt 73.5 kg (162 lb)   SpO2 99%   BMI 26.15 kg/m²     General appearance: alert, cooperative, no distress, appears stated age  Lungs: clear to auscultation bilaterally. Nonlabored. Heart: regular rate and rhythm, S1, S2 normal  Abdomen: soft, non-tender. Bowel sounds normal. No masses,  no organomegaly. No suprapubic tenderness. No CVA tenderness. Extremities: no cyanosis or edema  Neurologic: Grossly normal    Disposition: home  Discharge Condition: stable  Patient Instructions:   Discharge Medication List as of 4/18/2022 12:57 PM      CONTINUE these medications which have CHANGED    Details   nitrofurantoin, macrocrystal-monohydrate, (MACROBID) 100 mg capsule Take 1 Capsule by mouth two (2) times a day for 2 days. , Print, Disp-4 Capsule, R-0         CONTINUE these medications which have NOT CHANGED    Details   fexofenadine-pseudoephedrine (Allegra-D 24 Hour) 180-240 mg per tablet Take 1 Tablet by mouth daily. The pt is not sure about the dose, Historical Med      cyanocobalamin (VITAMIN B12) 100 mcg tablet Take 100 mcg by mouth daily. The pt is not sure about the dose, Historical Med      metoprolol succinate (TOPROL-XL) 50 mg XL tablet Take 1 Tablet by mouth daily. , Normal, Disp-90 Tablet, R-3      DULoxetine (CYMBALTA) 30 mg capsule Take 1 Capsule by mouth daily. , Normal, Disp-90 Capsule, R-3      estradioL (ESTRACE) 1 mg tablet Take 1 Tablet by mouth nightly for 90 days. , Normal, Disp-90 Tablet, R-3      pantoprazole (PROTONIX) 40 mg tablet Take 1 Tablet by mouth daily. , Normal, Disp-90 Tablet, R-3      Ozempic 1 mg/dose (4 mg/3 mL) pnij 1 mg by SubCUTAneous route every seven (7) days. , Normal, Disp-3 Each, R-3, STEVE      pneumococcal 20-judy conj-dip, PF, (Prevnar 20, PF,) 0.5 mL syrg injection 0.5 mL by IntraMUSCular route PRIOR TO DISCHARGE for 1 dose., Print, Disp-0.5 mL, R-0      Jardiance 25 mg tablet Take 1 Tablet by mouth daily. , Normal, Disp-90 Tablet, R-3, STEVE      metFORMIN ER (GLUCOPHAGE XR) 500 mg tablet Take 2 Tablets by mouth two (2) times a day., Normal, Disp-360 Tablet, R-3      Blood-Glucose Sensor (Dexcom G6 Sensor) nathaniel Change sensors every 10 days - 3 sensors per box every 30 days, Normal, Disp-10 Each, R-3Dx E11.9      Blood-Glucose Transmitter (Dexcom G6 Transmitter) nathaniel One transmitter every 3 mnths, Normal, Disp-1 Each, R-3Dx E11.9      homeopathic drugs (CINNABAR PO) Take 2,000 mg by mouth daily. , Historical Med      montelukast (SINGULAIR) 10 mg tablet Take 1 Tablet by mouth nightly for 90 days. , Normal, Disp-90 Tablet, R-3      Blood-Glucose Meter,Continuous (Dexcom G6 ) misc Use daily - one  per year, Normal, Disp-1 Each, R-0Dx E11.9      rosuvastatin (CRESTOR) 20 mg tablet Take 1 Tablet by mouth nightly., Normal, Disp-90 Tablet, R-3      ticagrelor (BRILINTA) 90 mg tablet Take 1 Tablet by mouth every twelve (12) hours every twelve (12) hours. , Normal, Disp-180 Tablet, R-3      losartan (COZAAR) 25 mg tablet Take 1 Tablet by mouth daily. , Normal, Disp-90 Tablet, R-3      nitroglycerin (NITROSTAT) 0.4 mg SL tablet 1 Tablet by SubLINGual route every five (5) minutes as needed for Chest Pain. Up to 3 doses. , Normal, Disp-1 Bottle, R-11      aspirin delayed-release 81 mg tablet Take 81 mg by mouth nightly., Historical Med      loratadine (Claritin) 10 mg tablet Take 10 mg by mouth nightly., Historical Med      cholecalciferol, vitamin D3, (Vitamin D3) 50 mcg (2,000 unit) tab Take 2,000 Units by mouth nightly., Historical Med      iron, carbonyl (FeosoL) 45 mg tab Take 1 Tablet by mouth nightly., Historical Med             Activity: Activity as tolerated  Diet: Resume previous diet  Wound Care: None needed    Follow-up  ·   With PCP as needed. Time spent to discharge patient 35 minutes  Signed:   Jayy Norton DO  4/18/2022  2:40 PM

## 2022-04-18 NOTE — PROGRESS NOTES
Verbal and bedside change of shift report given to Hiren Calvillo RN by Dasia Garcia RN. Report included the following information: SBAR, MAR,Recent results, Kardex, Intake/output.     Opportunity for questions and clarifications provided

## 2022-04-18 NOTE — PROGRESS NOTES
I have reviewed discharge instructions with the patient. The patient verbalized understanding. Tele monitor discontinued. Peripheral IV's removed.

## 2022-04-18 NOTE — H&P
[facility] Cardiology Initial Cardiac Evaluation                 Date of  Admission: 4/17/2022 10:29 AM     Primary Care Physician: Nighat Dutta MD  Primary Cardiologist: Dr Dalila Quinteros  Referring Physician: Dr Hilda Joy  Attending Physician: Dr Dalila Quinteros    CC: chest pain      Issac Hernandez is a 62 y.o. female admitted for Sepsis ([facility] [address] [A41.9]  SIRS (systemic inflammatory response syndrome) ([facility] [address] [R65.10]. She has a h/o DM (hgbA1C 8 on 4-2022), CAD w C 7-2021 w  of LAD s/p successful PCI d LAD, EF 40%. Echo  w EF 50% w mod hypokinesis of the apical inferior wall and mild to mod hypokinesis of distal septum. Friday night she began noticing her HR increasing to 130 w exertion, baseline around 80 w rest.  She then began having pain in the upper L chest/shoulder area radiating straight through to her back, a throbbing pain which would last a few seconds, not positional or related to exertion. No SOB, diaphoresis or nausea. No dizziness, palpitations, syncope or LE edema. 10 lb weight loss recently w change in diabetic meds. Through the weekend she continued to have tachycardic w transient CP both Saturday and Sunday. She presented to the ER 4-17 when her apple watch showed her -130, as high as 170 w continued bouts of CP. Toprol dose decreased from 100 to 50 due to fatigue. WBC 7.6, hgb 10.2,  , K 3.9, cr .40, mag 2.2, albumin 2.6, hgbA1C 8, iron low. HS trop 3.2 and 3.9, procal 1.06. Admitted by hospitalist for possible sepsis. Cultures pending. Given a dose of rocephin. Initial EKG ST w rate 124 w QTc 594. Repeat EKG NSR w rate 98 w NSST/T wave changes and QTc 449. CT chest showed no PE but a few B supleural nodules recommending f/u imaging. No further CP since admission. Soc: 1 ppd from age 16 until quit age 62  FH:  Mother had a \"light heart\" attack during cataract surgery     Patient Active Problem List   Diagnosis Code    Ischemic cardiomyopathy I25.5    CAD in native artery I25.10    Diabetes mellitus ([facility] [address] E11.9    Hyperlipidemia E78.5    Hypercholesterolemia E78.00    Type 2 diabetes mellitus (HCC) E11.9    SIRS (systemic inflammatory response syndrome) (HCC) R65.10       Past Medical History:   Diagnosis Date    GERD (gastroesophageal reflux disease)     Hypercholesterolemia     Type 2 diabetes mellitus (HCC)       Past Surgical History:   Procedure Laterality Date    HX APPENDECTOMY      HX CHOLECYSTECTOMY      HX HYSTERECTOMY      ovaries removed    HX PTCA      times 2     No Known Allergies   Family History   Problem Relation Age of Onset    Diabetes Mother     Diabetes Maternal Grandfather     Coronary Art Dis Neg Hx       Social History     Tobacco Use    Smoking status: Former Smoker     Packs/day: 1.00     Years: 45.00     Pack years: 45.00     Quit date: 2021     Years since quittin.8    Smokeless tobacco: Never Used   Substance Use Topics    Alcohol use: Not Currently        Current Facility-Administered Medications   Medication Dose Route Frequency    sodium chloride (NS) flush 5-10 mL  5-10 mL IntraVENous Q8H    sodium chloride (NS) flush 5-10 mL  5-10 mL IntraVENous PRN    sodium chloride (NS) flush 5-40 mL  5-40 mL IntraVENous Q8H    sodium chloride (NS) flush 5-40 mL  5-40 mL IntraVENous PRN    acetaminophen (TYLENOL) tablet 650 mg  650 mg Oral Q6H PRN    Or    acetaminophen (TYLENOL) suppository 650 mg  650 mg Rectal Q6H PRN    polyethylene glycol (MIRALAX) packet 17 g  17 g Oral DAILY PRN    ondansetron (ZOFRAN ODT) tablet 4 mg  4 mg Oral Q8H PRN    Or    ondansetron (ZOFRAN) injection 4 mg  4 mg IntraVENous Q6H PRN    enoxaparin (LOVENOX) injection 40 mg  40 mg SubCUTAneous DAILY    aspirin delayed-release tablet 81 mg  81 mg Oral QHS    [Held by provider] DULoxetine (CYMBALTA) capsule 30 mg  30 mg Oral DAILY    estradioL (ESTRACE) tablet 1 mg  1 mg Oral QHS    losartan (COZAAR) tablet 25 mg  25 mg Oral DAILY  insulin lispro (HUMALOG) injection   SubCUTAneous AC&HS    metoprolol succinate (TOPROL-XL) XL tablet 50 mg  50 mg Oral DAILY    pantoprazole (PROTONIX) tablet 40 mg  40 mg Oral DAILY    rosuvastatin (CRESTOR) tablet 20 mg  20 mg Oral QHS    ticagrelor (BRILINTA) tablet 90 mg  90 mg Oral Q12H    cholecalciferol (VITAMIN D3) (1000 Units /25 mcg) tablet 2,000 Units  2,000 Units Oral QHS       Review of Symptoms:  General: + weight loss w change in diabetic meds,  no weakness, fever or chills  Skin: no rashes, lumps, or other skin changes  HEENT: no headache, dizziness, lightheadedness, vision changes, hearing changes, tinnitus, vertigo, sinus pressure/pain, bleeding gums, sore throat, or hoarseness  Neck: no swollen glands, goiter, pain or stiffness  Respiratory: no cough, sputum, hemoptysis, no dyspnea, wheezing  Cardiovascular: + as per HPI  Gastrointestinal: no GERD, constipation, diarrhea, liver problems, or h/o GI bleed  Urinary: no frequency, urgency , hematuria, burning/pain with urination, recent flank pain, polyuria, nocturia, or difficulty urinating  Peripheral Vascular: no claudication, leg cramps, prior DVTs, swelling of calves, legs, or feet, color change, or swelling with redness or tenderness  Musculoskeletal: no muscle or joint pain/stiffness, joint swelling, erythema of joints, or back pain  Psychiatric: no depression or excessive stress  Neurological: no sensory or motor loss, seizures, syncope, tremors, numbness, no dementia  Hematologic: no anemia, easy bruising or bleeding  Endocrine: no thyroid problems, heat or cold intolerance, excessive sweating, polyuria, polydipsia, +  diabetes.        Physical Exam  Vitals:    04/17/22 2357 04/18/22 0007 04/18/22 0400 04/18/22 0444   BP:  117/61  (!) 110/58   Pulse: (!) 102 (!) 109 97 (!) 105   Resp:  18  16   Temp:  98.5 °F (36.9 °C)  98.4 °F (36.9 °C)   SpO2:  98%  96%   Weight:       Height:           Physical Exam:  General: Well Developed, Well Nourished, No Acute Distress  Head: normocephalic atraumatic   ENT: pupils equal and round, no abnormalities noted  Neck: supple, no JVD, no carotid bruits  Heart: S1S2 with RRR without murmurs or gallops, no chest tenderness w palpation   Lungs: Clear throughout auscultation bilaterally without adventitious sounds  Abd: soft, nontender, nondistended, with good bowel sounds  Ext: warm, no edema  Skin: warm and dry  Psychiatric: Normal mood and affect, A&O x 3  Neurologic: normal muscle tone      Labs:   Recent Labs     04/18/22  0440 04/17/22  1023    136   K 3.9 4.1   MG 2.2 1.7*   BUN 10 11   CREA 0.40* 0.70   * 156*   WBC 7.6 9.6   HGB 10.2* 11.4*   HCT 31.4* 36.2    179        Assessment/Plan:     Assessment:   SIRS (systemic inflammatory response syndrome) ([facility]) (4/17/2022)- cultures pending, s/p one dose of rocephin    CAD in native artery (7/1/2021)- PCI to LAD 7-2022, no missed doses of DAP, CP atypical for angina w negative trop  - cont ASA, brilinta, ARB, BB, statin    Ischemic cardiomyopathy -last EF 50%, cont ARB, BB    Diabetes mellitus (HCC) ()- cont current meds    Hyperlipidemia ()- statin    Abnormal chest CT- smoked 1 ppd x 44 years, quit 2021, per primary       Thank you very much for this referral. We appreciate the opportunity to participate in this patient's care. We will follow along with above stated plan. Shawna Hopkins PA-C  Consulting MD: [place] Jerez    I have personally seen and examined patient and agree with above assessment. I agree and confirm with findings with additional details/exceptions as listed below:    Known to me from the outpatient setting. History of coronary disease status post PCI to  prox to dLAD from 7/1/2021 (~60mm stenting). Hx of DM (states last A1C ~9 from 4/21), HLP. No prior CVA/TIA.   Coronary angiogram triggered due to initial echo with severe hypokinesis of the mid to apical septal/apical inferior wall with ejection fraction of around 40% from 6/2/2021. Repeat from 10/6/21 at the lower limit of normal with an ejection fraction of around 50%. Moderate hypokinesis of the apical inferior wall/mild to moderate hypokinesis of the distal septum. Patient presented to the ER on 4/17/2022 and states her apple watch had a heart rates in the 120s to 130s. Had some intermittent episodes of atypical chest discomfort. On presentation in the ER, noted with sinus tachycardia with a heart rate of 124. States had her Toprol-XL cut down from 100 to 50 mg daily due to some issues with fatigue which has improved. Changed on about a month ago. Concern for SIRS on presentation. Physical exam  Cardiovascular regular rate and rhythm no murmurs, lungs clear to auscultation bilaterally, extremities with no edema    Plan    Tachycardia  -Consistent with sinus tachycardia improved and likely secondary to possible SIRS. No active cardiac issues. Coronary disease  -Stable. Plan indefinite dual antiplatelet therapy with  stenting.  -Continue high intensity statin. Continue current Toprol-XL 50 mg daily. Unlikely causing tachycardia with decreasing dose down about 4 weeks ago per history. Improved as well. Ischemic cardiomyopathy  --improved on last echocardiogram; persistent wall motion abnormalities. Changed Coreg over to Toprol-XL with some increased fatigue; states improved fatigue and decreased dose of Toprol-XL. Can reassess in the outpatient setting. Continue losartan.     Hyperlipidemia  -Continue high intensity statin      Maria Teresa Jerry MD  4/18/2022

## 2022-04-19 LAB — HAPTOGLOB SERPL-MCNC: 425 MG/DL (ref 30–200)

## 2022-04-22 LAB
BACTERIA SPEC CULT: NORMAL
BACTERIA SPEC CULT: NORMAL
SERVICE CMNT-IMP: NORMAL
SERVICE CMNT-IMP: NORMAL

## 2022-05-24 ENCOUNTER — TELEPHONE (OUTPATIENT)
Dept: CARDIOLOGY CLINIC | Age: 62
End: 2022-05-24

## 2022-05-24 NOTE — TELEPHONE ENCOUNTER
Pliable knot in back of L knee. It is not painful. It has been there 2 weeks. It is the size of a pea.

## 2022-05-24 NOTE — TELEPHONE ENCOUNTER
Spoke to pt. States she has a knot on the back of her leg, under the skin. Attempted to call PCP but neither she, nor her nurse are in this week. Suggested pt go to urgent care if she needs this evaluated prior to being able to get in with PCP. Pt asks if this might be a blood clot. Again suggested urgent care if PCP can't see her. Verb understanding.

## 2022-06-13 DIAGNOSIS — E11.649 TYPE 2 DIABETES MELLITUS WITH HYPOGLYCEMIA WITHOUT COMA, WITHOUT LONG-TERM CURRENT USE OF INSULIN (HCC): Primary | ICD-10-CM

## 2022-07-25 ENCOUNTER — TELEPHONE (OUTPATIENT)
Dept: INTERNAL MEDICINE CLINIC | Facility: CLINIC | Age: 62
End: 2022-07-25

## 2022-07-25 DIAGNOSIS — R30.0 DYSURIA: Primary | ICD-10-CM

## 2022-07-25 NOTE — TELEPHONE ENCOUNTER
UTI SYMPTOMS    BURNING : No    FEVER? If yes, document temperature: No    CHILLS: No    NAUSEA? No  VOMITING? No    BLOOD IN URINE? No    BACK PAIN? No    HOW LONG HAVE YOU HAD THE ABOVE SYMPTOMS? Pt has tingling sensation when urinating. ALLERGIES:     PHARMACY: [facility]    : 1960    Pt called and stated after finishing her previous prescription for her UTI, that she still feels like it is present.

## 2022-07-25 NOTE — TELEPHONE ENCOUNTER
Schedule nurse visit for ua and culture. I've ordered these. If nothing available tomorrow, can come in for just lab. Thanks.   Tenisha Reardon

## 2022-07-26 ENCOUNTER — NURSE ONLY (OUTPATIENT)
Dept: INTERNAL MEDICINE CLINIC | Facility: CLINIC | Age: 62
End: 2022-07-26

## 2022-07-26 DIAGNOSIS — E11.649 TYPE 2 DIABETES MELLITUS WITH HYPOGLYCEMIA WITHOUT COMA, WITHOUT LONG-TERM CURRENT USE OF INSULIN (HCC): ICD-10-CM

## 2022-07-26 DIAGNOSIS — R30.0 DYSURIA: ICD-10-CM

## 2022-07-26 LAB
ALBUMIN SERPL-MCNC: 3.8 G/DL (ref 3.2–4.6)
ALBUMIN/GLOB SERPL: 1.2 {RATIO} (ref 1.2–3.5)
ALP SERPL-CCNC: 105 U/L (ref 50–136)
ALT SERPL-CCNC: 24 U/L (ref 12–65)
ANION GAP SERPL CALC-SCNC: 10 MMOL/L (ref 7–16)
APPEARANCE UR: CLEAR
AST SERPL-CCNC: 13 U/L (ref 15–37)
BILIRUB SERPL-MCNC: 0.3 MG/DL (ref 0.2–1.1)
BILIRUB UR QL: NEGATIVE
BUN SERPL-MCNC: 16 MG/DL (ref 8–23)
CALCIUM SERPL-MCNC: 9.3 MG/DL (ref 8.3–10.4)
CHLORIDE SERPL-SCNC: 107 MMOL/L (ref 98–107)
CO2 SERPL-SCNC: 23 MMOL/L (ref 21–32)
COLOR UR: ABNORMAL
CREAT SERPL-MCNC: 0.8 MG/DL (ref 0.6–1)
GLOBULIN SER CALC-MCNC: 3.2 G/DL (ref 2.3–3.5)
GLUCOSE SERPL-MCNC: 184 MG/DL (ref 65–100)
GLUCOSE UR STRIP.AUTO-MCNC: >1000 MG/DL
HGB UR QL STRIP: NEGATIVE
KETONES UR QL STRIP.AUTO: NEGATIVE MG/DL
LEUKOCYTE ESTERASE UR QL STRIP.AUTO: NEGATIVE
NITRITE UR QL STRIP.AUTO: NEGATIVE
PH UR STRIP: 5 [PH] (ref 5–9)
POTASSIUM SERPL-SCNC: 4.9 MMOL/L (ref 3.5–5.1)
PROT SERPL-MCNC: 7 G/DL (ref 6.3–8.2)
PROT UR STRIP-MCNC: NEGATIVE MG/DL
SODIUM SERPL-SCNC: 140 MMOL/L (ref 136–145)
SP GR UR REFRACTOMETRY: 1.03 (ref 1–1.02)
UROBILINOGEN UR QL STRIP.AUTO: 0.2 EU/DL (ref 0.2–1)

## 2022-07-29 LAB
BACTERIA SPEC CULT: NORMAL
SERVICE CMNT-IMP: NORMAL

## 2022-08-02 ENCOUNTER — OFFICE VISIT (OUTPATIENT)
Dept: ENDOCRINOLOGY | Age: 62
End: 2022-08-02
Payer: COMMERCIAL

## 2022-08-02 VITALS
DIASTOLIC BLOOD PRESSURE: 68 MMHG | HEIGHT: 66 IN | WEIGHT: 176.6 LBS | BODY MASS INDEX: 28.38 KG/M2 | TEMPERATURE: 98.3 F | OXYGEN SATURATION: 100 % | HEART RATE: 79 BPM | SYSTOLIC BLOOD PRESSURE: 126 MMHG

## 2022-08-02 DIAGNOSIS — E11.649 TYPE 2 DIABETES MELLITUS WITH HYPOGLYCEMIA WITHOUT COMA, WITHOUT LONG-TERM CURRENT USE OF INSULIN (HCC): Primary | ICD-10-CM

## 2022-08-02 LAB — HBA1C MFR BLD: 7.1 %

## 2022-08-02 PROCEDURE — 99215 OFFICE O/P EST HI 40 MIN: CPT | Performed by: DIETITIAN, REGISTERED

## 2022-08-02 PROCEDURE — 83036 HEMOGLOBIN GLYCOSYLATED A1C: CPT | Performed by: DIETITIAN, REGISTERED

## 2022-08-02 PROCEDURE — 95251 CONT GLUC MNTR ANALYSIS I&R: CPT | Performed by: DIETITIAN, REGISTERED

## 2022-08-02 PROCEDURE — 3052F HG A1C>EQUAL 8.0%<EQUAL 9.0%: CPT | Performed by: DIETITIAN, REGISTERED

## 2022-08-02 RX ORDER — METFORMIN HYDROCHLORIDE 500 MG/1
1000 TABLET, EXTENDED RELEASE ORAL 2 TIMES DAILY
Qty: 360 TABLET | Refills: 11
Start: 2022-08-02 | End: 2022-09-08 | Stop reason: SDUPTHER

## 2022-08-02 RX ORDER — EMPAGLIFLOZIN 25 MG/1
25 TABLET, FILM COATED ORAL DAILY
COMMUNITY
End: 2022-08-02 | Stop reason: DRUGHIGH

## 2022-08-02 RX ORDER — INSULIN ASPART 100 [IU]/ML
2 INJECTION, SOLUTION INTRAVENOUS; SUBCUTANEOUS
Qty: 39 ML | Refills: 11
Start: 2022-08-02 | End: 2022-09-08 | Stop reason: SDUPTHER

## 2022-08-02 RX ORDER — INSULIN DETEMIR 100 [IU]/ML
16 INJECTION, SOLUTION SUBCUTANEOUS NIGHTLY
Qty: 27 ML | Refills: 11 | Status: SHIPPED | OUTPATIENT
Start: 2022-08-02 | End: 2022-08-02

## 2022-08-02 RX ORDER — SEMAGLUTIDE 2.68 MG/ML
2 INJECTION, SOLUTION SUBCUTANEOUS
Qty: 3 ML | Refills: 5 | Status: SHIPPED | OUTPATIENT
Start: 2022-08-02 | End: 2022-09-08 | Stop reason: SDUPTHER

## 2022-08-02 RX ORDER — INSULIN DETEMIR 100 [IU]/ML
16 INJECTION, SOLUTION SUBCUTANEOUS NIGHTLY
Qty: 27 ML | Refills: 11 | Status: SHIPPED | OUTPATIENT
Start: 2022-08-02 | End: 2022-09-08 | Stop reason: SDUPTHER

## 2022-08-02 RX ORDER — SEMAGLUTIDE 1.34 MG/ML
1 INJECTION, SOLUTION SUBCUTANEOUS
COMMUNITY
End: 2022-08-02 | Stop reason: DRUGHIGH

## 2022-08-02 RX ORDER — INSULIN DETEMIR 100 [IU]/ML
INJECTION, SOLUTION SUBCUTANEOUS
COMMUNITY
End: 2022-08-02 | Stop reason: SDUPTHER

## 2022-08-02 RX ORDER — EMPAGLIFLOZIN 10 MG/1
10 TABLET, FILM COATED ORAL DAILY
Qty: 90 TABLET | Refills: 11 | Status: SHIPPED | OUTPATIENT
Start: 2022-08-02 | End: 2022-09-08 | Stop reason: SDUPTHER

## 2022-08-02 RX ORDER — INSULIN ASPART 100 [IU]/ML
INJECTION, SOLUTION INTRAVENOUS; SUBCUTANEOUS
COMMUNITY
End: 2022-08-02 | Stop reason: SDUPTHER

## 2022-08-02 RX ORDER — METFORMIN HYDROCHLORIDE 500 MG/1
1000 TABLET, EXTENDED RELEASE ORAL 2 TIMES DAILY
Qty: 360 TABLET | Refills: 11 | Status: SHIPPED | OUTPATIENT
Start: 2022-08-02 | End: 2022-08-02 | Stop reason: SDUPTHER

## 2022-08-02 RX ORDER — ESTRADIOL 0.1 MG/G
2 CREAM VAGINAL DAILY
Qty: 1 EACH | Refills: 11 | Status: SHIPPED | OUTPATIENT
Start: 2022-08-02 | End: 2022-09-19 | Stop reason: ALTCHOICE

## 2022-08-02 ASSESSMENT — ENCOUNTER SYMPTOMS
SHORTNESS OF BREATH: 0
COUGH: 0
CONSTIPATION: 0
NAUSEA: 0
WHEEZING: 0
VOICE CHANGE: 0
BACK PAIN: 0
DIARRHEA: 0
VOMITING: 0
TROUBLE SWALLOWING: 0
ABDOMINAL PAIN: 0

## 2022-08-02 NOTE — PROGRESS NOTES
[facility] ENDOCRINOLOGY   AND   THYROID NODULE CLINIC    ALEX Avendaño  [address], [address] [facility], [address] [place] [address]  Phone [phone]  Facsimile [phone]      Reason for visit: Dell ARROYO (1960) is here for follow up of Type 2 Diabetes Mellitus. ASSESSMENT AND PLAN:    Interpretation of 72 hour glucose monitor: At least 72 hours of data were reviewed. The patient utilizes a Dexcom G6 continuous glucose monitoring system. The average glucose during the reviewed timeframe was 170 with a standard deviation of 46 (time in range 63.7%, hyperglycemia 36%, hypoglycemia 1%). There is a pattern of postprandial hyperglycemia. 1. Type 2 diabetes mellitus with hypoglycemia without coma, without long-term current use of insulin (HCC)  A1c is 7.1%. Glycemic control is improving. Patient continues to have postprandial hyperglycemia which worsens at dinner. Patient has a history of UTIs (at least 3 in past 6 months) with SGLT2 inhibitor. Discussed options which include adding daily probiotic orally, and vaginal Estrace cream as prescribed. Patient would like to lower dose of Jardiance and add probiotic with daily vaginal cream to reduce risk of UTIs. Discussed if UTIs continue we will discontinue SGLT2 inhibitor. --- Decrease Jardiance to 10 mg every morning. --- Increase Ozempic to 2 mg weekly. --- Continue Levemir 16 units every night. --- Continue metformin  mg 2 tablets, twice daily. --- Novolog 2 units AC TID with correction 1 for 50 above 150 mg/dL. --- Follow-up in 1 month    - AMB POC HEMOGLOBIN A1C  - OZEMPIC, 2 MG/DOSE, 8 MG/3ML SOPN; Inject 2 mg into the skin every 7 days  Dispense: 3 mL; Refill: 5  - ESTRACE VAGINAL 0.1 MG/GM vaginal cream; Place 2 g vaginally in the morning. Insert 2 gram daily intravaginally for 2 weeks, followed by a maintenance dose of 1 gram two to three times per week. Dispense: 1 each;  Refill: 11  - JARDIANCE 10 MG tablet; Take 1 tablet by mouth in the morning. Dispense: 90 tablet; Refill: 11  - Comprehensive Metabolic Panel; Future  - GLUCOSE MONITOR, 72 HOUR, PHYS INTERP  - LEVEMIR FLEXTOUCH 100 UNIT/ML injection pen; Inject 16 Units into the skin nightly Titrate 2 units every 3 days to fasting BG goal of  mg/dL. Max Daily dose of 20 units  Dispense: 27 mL; Refill: 11  - metFORMIN (GLUCOPHAGE-XR) 500 MG extended release tablet; Take 2 tablets by mouth in the morning and 2 tablets before bedtime. Dispense: 360 tablet; Refill: 11  - NOVOLOG FLEXPEN 100 UNIT/ML injection pen; Inject 2 Units into the skin in the morning and 2 Units at noon and 2 Units in the evening. Inject before meals. Correction scale for mealtime: 1 units for 50 points greater than 150 mg/dL. Max daily dose: 40 units. Dispense: 39 mL; Refill: 11     2. Hypercholesterolemia   Last LDL 28 at goal on rosuvastatin 20 mg.      3. CAD in native artery   Jardiance and Ozempic are cardioprotective and renal protective. Follow-up and Dispositions    Return in about 1 month (around 9/2/2022). Cardiologist: [facility] Dr. Karri Barber      History of Present Illness:         Current Diabetes Medications: Jardiance 25 mg daily in the morning, Ozempic 1 mg weekly, Metformin XR 2000 mg, levemir 16 units night. History given by patient. July 1st 2021 - stopped smoking - 1 pack per day - 45 years. Date of DM diagnosis: age 44, year 2003       Medication Failures: Trulicity - sick on higher dose, Rybelsus - did nothing for BG. Current symptoms: See Review of Systems       Neuropathy: none       Nephropathy: Stage 2 Kidney Disease   10/13/2021      Cr 0.52, , microalbumin/Cr ratio none                    02/25/2022                 microalbumin/Cr ratio 35   04/18/2022 Cr 0.40, GFR >60,  07/26/2022 Cr 0.80, GFR >60, microalbumin/Cr ratio none     Retinopathy: Last eye exam was 12/30/2021 and demonstrated no diabetic retinopathy. Eye care specialist is Dr. Katiuska Pimentel, [facility]. S/p cataract removal bilaterally. Diet:    Breakfast; banana and peanut butter,   Lunch; chicken soup and salad   Dinner; meat, vegetable, and starch. Snacks - fruit or fruit parfee   Drink: water, coffee, tea, diet coke. No milk, no bread, no eggs. Exercise:  [facility] in [place], [place] [facility] - 15-30 minutes on treadmill, 20-30 minutes (4 days a week). Diabetes education: The patient has not received formal diabetes education. Hemoglobin A1c:   08/09/2010      7.3%   01/17/2020      7.9%   07/06/2020      7.7%   10/06/2020      7.8%   04/01/2021      8.2%   09/28/2021      9.7%   11/16/2021      8.0%   03/02/2022     7.7%   04/17/2022      8.0%  08/02/2022      7.1%       Home blood glucose monitoring frequency:   By review of CGM download over past 28 days   Average blood glucose 217   Time in range 18%   High 62%, Very High 20%   Low 0%, Very Low 0%              Average            Fasting  195            AC lunch  204        AC supper  220        Bedtime  266            Blood glucose levels are uncontrolled, most significant elevations are afternoons and evenings. Pregnancy: s/p menopause       Lipids:    11/16/2021  TC- 98, LDL- 28, VLDL- 20,  HDL- 50, TG- 100       Thyroid:   04/17/2022    TSH   3.320       BP:                 BP Readings from Last 3 Encounters:        01/27/22  112/60     12/13/21  (!) 119/49     12/01/21  132/61         Weight Trends:                  Wt Readings from Last 3 Encounters:        01/27/22  170 lb (77.1 kg)     12/13/21  169 lb 9.6 oz (76.9 kg)     12/01/21  175 lb (79.4 kg)         Medical/Surgical/Social/Family History: Reviewed in Chart       Medications: Reviewed in chart       Allergies   Patient has no known allergies    /68   Pulse 79   Temp 98.3 °F (36.8 °C) (Tympanic)   Ht 5' 6\" (1.676 m)   Wt 176 lb 9.6 oz (80.1 kg)   SpO2 100%   BMI 28.50 kg/m²     Weight Trends: Wt Readings from Last 3 Encounters:   08/02/22 176 lb 9.6 oz (80.1 kg)   05/11/22 161 lb (73 kg)   05/04/22 160 lb 6.4 oz (72.8 kg)     Allergies and Medications: Reviewed in Chart    Review of Systems   Constitutional:  Negative for appetite change, diaphoresis, fatigue and unexpected weight change. HENT:  Negative for trouble swallowing and voice change. Eyes:  Negative for visual disturbance. Respiratory:  Negative for cough, shortness of breath and wheezing. Cardiovascular:  Negative for chest pain, palpitations and leg swelling. Gastrointestinal:  Negative for abdominal pain, constipation, diarrhea, nausea and vomiting. Endocrine: Negative for cold intolerance, heat intolerance, polydipsia, polyphagia and polyuria. Genitourinary:  Negative for difficulty urinating and frequency. Musculoskeletal:  Negative for arthralgias, back pain and myalgias. Skin:  Negative for pallor. Neurological:  Negative for dizziness, tremors, weakness, numbness and headaches. Hematological:  Negative for adenopathy. Psychiatric/Behavioral:  Negative for dysphoric mood and sleep disturbance. The patient is not nervous/anxious. Physical Exam  Constitutional:       General: She is not in acute distress. Appearance: Normal appearance. She is normal weight. She is not ill-appearing. HENT:      Head: Normocephalic. Cardiovascular:      Rate and Rhythm: Normal rate and regular rhythm. Pulses: Normal pulses. Pulmonary:      Effort: No respiratory distress. Breath sounds: Normal breath sounds. No wheezing or rhonchi. Chest:      Chest wall: No tenderness. Abdominal:      General: There is no distension. Palpations: Abdomen is soft. Tenderness: There is no abdominal tenderness. There is no guarding. Musculoskeletal:         General: No swelling, tenderness or signs of injury. Cervical back: Neck supple. No tenderness. Right lower leg: No edema.       Left mcg by mouth daily      DULoxetine (CYMBALTA) 30 MG extended release capsule Take 30 mg by mouth daily      losartan (COZAAR) 25 MG tablet Take 25 mg by mouth daily      metoprolol succinate (TOPROL XL) 50 MG extended release tablet Take 50 mg by mouth daily      montelukast (SINGULAIR) 10 MG tablet Take 10 mg by mouth      nitrofurantoin, macrocrystal-monohydrate, (MACROBID) 100 MG capsule Take 100 mg by mouth 2 times daily      nitroGLYCERIN (NITROSTAT) 0.4 MG SL tablet Place 0.4 mg under the tongue      ondansetron (ZOFRAN-ODT) 4 MG disintegrating tablet Take 4 mg by mouth every 8 hours as needed      pantoprazole (PROTONIX) 40 MG tablet Take 40 mg by mouth daily      rosuvastatin (CRESTOR) 20 MG tablet Take 20 mg by mouth      ticagrelor (BRILINTA) 90 MG TABS tablet Take 90 mg by mouth every 12 hours      estradiol (ESTRACE) 1 MG tablet Take 1 mg by mouth      Glucagon (GVOKE HYPOPEN 2-PACK) 1 MG/0.2ML SOAJ Inject 1 mg into the skin as needed       No current facility-administered medications for this visit. STEFFI Villalobos NP    On this date 8/2/2022 I have spent 45 minutes reviewing previous notes, test results and face to face with the patient discussing the diagnosis and importance of compliance with the treatment plan as well as documenting on the day of the visit. Portions of this note were generated with the assistance of voice recognition software. As such, some errors in transcription may be present.

## 2022-08-04 ENCOUNTER — PATIENT MESSAGE (OUTPATIENT)
Dept: ENDOCRINOLOGY | Age: 62
End: 2022-08-04

## 2022-08-04 DIAGNOSIS — E11.649 TYPE 2 DIABETES MELLITUS WITH HYPOGLYCEMIA WITHOUT COMA, WITHOUT LONG-TERM CURRENT USE OF INSULIN (HCC): Primary | ICD-10-CM

## 2022-08-04 RX ORDER — BLOOD-GLUCOSE METER
1 KIT MISCELLANEOUS
Qty: 1 KIT | Refills: 0 | Status: SHIPPED | OUTPATIENT
Start: 2022-08-04

## 2022-08-04 RX ORDER — LANCETS 33 GAUGE
1 EACH MISCELLANEOUS
Qty: 400 EACH | Refills: 3 | Status: SHIPPED | OUTPATIENT
Start: 2022-08-04

## 2022-08-04 RX ORDER — BLOOD SUGAR DIAGNOSTIC
1 STRIP MISCELLANEOUS
COMMUNITY
End: 2022-08-04 | Stop reason: SDUPTHER

## 2022-08-04 RX ORDER — BLOOD-GLUCOSE METER
1 KIT MISCELLANEOUS
COMMUNITY
End: 2022-08-04 | Stop reason: SDUPTHER

## 2022-08-04 RX ORDER — BLOOD SUGAR DIAGNOSTIC
1 STRIP MISCELLANEOUS
Qty: 400 EACH | Refills: 3 | Status: SHIPPED | OUTPATIENT
Start: 2022-08-04

## 2022-08-04 RX ORDER — LANCETS 33 GAUGE
1 EACH MISCELLANEOUS
COMMUNITY
End: 2022-08-04 | Stop reason: SDUPTHER

## 2022-08-04 NOTE — TELEPHONE ENCOUNTER
STEFFI Yang NP 8/4/2022 12:27 PM EDT    Annalise Cruz,    Please advise about test strips and meter for her on her insurance plan. Pend to my chart and I will sign. Thanks,    Jolly Kat  ----- Message -----  From: Mariah Shabazz MA  Sent: 8/4/2022 11:13 AM EDT  To: STEFFI Yang NP  Subject: FW: New Glucose meter       ----- Message -----  From: Vicky Taylor  Sent: 8/4/2022 11:12 AM EDT  To: [address] Endocrinology Clinical Staff  Subject: New Glucose meter     At pharmacy. They dont have anything from you regarding free meter and strips you advised I qualified for.

## 2022-09-06 ENCOUNTER — PATIENT MESSAGE (OUTPATIENT)
Dept: ENDOCRINOLOGY | Age: 62
End: 2022-09-06

## 2022-09-07 NOTE — TELEPHONE ENCOUNTER
From: Sher Samaniego  To:  Monse Livingston  Sent: 9/6/2022 5:08 PM EDT  Subject: Test results for Thursday Appointment     Results attached

## 2022-09-08 ENCOUNTER — OFFICE VISIT (OUTPATIENT)
Dept: ENDOCRINOLOGY | Age: 62
End: 2022-09-08
Payer: COMMERCIAL

## 2022-09-08 VITALS
SYSTOLIC BLOOD PRESSURE: 124 MMHG | HEIGHT: 66 IN | BODY MASS INDEX: 28.93 KG/M2 | OXYGEN SATURATION: 98 % | DIASTOLIC BLOOD PRESSURE: 68 MMHG | HEART RATE: 77 BPM | WEIGHT: 180 LBS

## 2022-09-08 DIAGNOSIS — R80.9 ALBUMINURIA: ICD-10-CM

## 2022-09-08 DIAGNOSIS — E11.649 TYPE 2 DIABETES MELLITUS WITH HYPOGLYCEMIA WITHOUT COMA, WITHOUT LONG-TERM CURRENT USE OF INSULIN (HCC): Primary | ICD-10-CM

## 2022-09-08 PROCEDURE — 3052F HG A1C>EQUAL 8.0%<EQUAL 9.0%: CPT | Performed by: DIETITIAN, REGISTERED

## 2022-09-08 PROCEDURE — 99215 OFFICE O/P EST HI 40 MIN: CPT | Performed by: DIETITIAN, REGISTERED

## 2022-09-08 PROCEDURE — 95251 CONT GLUC MNTR ANALYSIS I&R: CPT | Performed by: DIETITIAN, REGISTERED

## 2022-09-08 RX ORDER — BLOOD-GLUCOSE TRANSMITTER
EACH MISCELLANEOUS
COMMUNITY

## 2022-09-08 RX ORDER — METFORMIN HYDROCHLORIDE 500 MG/1
1000 TABLET, EXTENDED RELEASE ORAL 2 TIMES DAILY
Qty: 360 TABLET | Refills: 11
Start: 2022-09-08

## 2022-09-08 RX ORDER — INSULIN DETEMIR 100 [IU]/ML
18 INJECTION, SOLUTION SUBCUTANEOUS NIGHTLY
Qty: 27 ML | Refills: 11 | Status: SHIPPED | OUTPATIENT
Start: 2022-09-08

## 2022-09-08 RX ORDER — EMPAGLIFLOZIN 10 MG/1
10 TABLET, FILM COATED ORAL DAILY
Qty: 90 TABLET | Refills: 11
Start: 2022-09-08

## 2022-09-08 RX ORDER — INSULIN ASPART 100 [IU]/ML
3 INJECTION, SOLUTION INTRAVENOUS; SUBCUTANEOUS
Qty: 39 ML | Refills: 11
Start: 2022-09-08

## 2022-09-08 RX ORDER — BLOOD-GLUCOSE SENSOR
EACH MISCELLANEOUS
COMMUNITY

## 2022-09-08 RX ORDER — BLOOD-GLUCOSE TRANSMITTER
EACH MISCELLANEOUS
Qty: 1 EACH | Refills: 3 | Status: SHIPPED | OUTPATIENT
Start: 2022-09-08

## 2022-09-08 RX ORDER — SEMAGLUTIDE 2.68 MG/ML
2 INJECTION, SOLUTION SUBCUTANEOUS
Qty: 3 ML | Refills: 5 | Status: SHIPPED | OUTPATIENT
Start: 2022-09-08

## 2022-09-08 RX ORDER — BLOOD-GLUCOSE SENSOR
EACH MISCELLANEOUS
Qty: 9 EACH | Refills: 3 | Status: SHIPPED | OUTPATIENT
Start: 2022-09-08

## 2022-09-08 ASSESSMENT — ENCOUNTER SYMPTOMS
COUGH: 0
TROUBLE SWALLOWING: 0
BACK PAIN: 0
VOICE CHANGE: 0
ABDOMINAL PAIN: 0
WHEEZING: 0
SHORTNESS OF BREATH: 0
NAUSEA: 1
VOMITING: 0
CONSTIPATION: 0
DIARRHEA: 0

## 2022-09-08 NOTE — PROGRESS NOTES
[facility] ENDOCRINOLOGY   AND   THYROID NODULE CLINIC    ALEX Cain  [address], [address] [facility], [address] [place] [address] Theresa  Phone [phone]  Facsimile [phone]      Reason for visit: Marisol ARROYO (1960) is here for follow up of type 2 Diabetes Mellitus. ASSESSMENT AND PLAN:    Interpretation of 72 hour glucose monitor: At least 72 hours of data were reviewed. The patient utilizes a Dexcom G6 continuous glucose monitoring system. The average glucose during the reviewed timeframe was 168 with a standard deviation of 41 (time in range 68%, hyperglycemia 31%, hypoglycemia 2%). There is a pattern of postprandial hyperglycemia. 1. Type 2 diabetes mellitus with hypoglycemia without coma, without long-term current use of insulin ([facility])  A1c is 7.1%. Glycemic control is continuing to improve. Patient experiences hyperglycemia which worsens between 9 AM and 12 noon and 7 PM and 1 AM.  She reports a high caffeine intake which includes coffee, diet sodas, and unsweet tea with sweet and low and very little water. She is also eating ice cream after dinner which is causing hyperglycemia in the evening. We discussed the importance of selecting lower glycemic index food choices and adding protein at mealtime. Weight gain has continued and patient reports she has gained 20 pounds since  primarily due to to eating ice cream at night. If patient is unable to obtain Ozempic due to backorder and supply issue concerns, recommend switching to [facility] [place], [place]. Discussed use of Afrezza at mealtime to support greater flexibility with mealtime dosing. Patient will be requesting pulmonary function test from PCP prior to initiation. Patient has had no recent UTIs with lower Jardiance dose. She has not started estrace vaginal cream and probiotic yet.     --Increase NovoLog to 3 units AC 3 times daily with correction 1 for 50 above 150.  --- Move NovoLog 15 to 30 minutes ahead of the meal time.  --- Split Levemir 9 units in the morning 9 units in the evening. --- Continue Jardiance 10 mg every morning. --- Continue metformin  mg 2 tablets twice daily. --- Continue Ozempic 2 mg once weekly. ---   - LEVEMIR FLEXTOUCH 100 UNIT/ML injection pen; Inject 18 Units into the skin nightly Titrate 2 units every 3 days to fasting BG goal of  mg/dL. Max Daily dose of 30 units  Dispense: 27 mL; Refill: 11  - JARDIANCE 10 MG tablet; Take 1 tablet by mouth daily  Dispense: 90 tablet; Refill: 11  - metFORMIN (GLUCOPHAGE-XR) 500 MG extended release tablet; Take 2 tablets by mouth 2 times daily  Dispense: 360 tablet; Refill: 11  - NOVOLOG FLEXPEN 100 UNIT/ML injection pen; Inject 3 Units into the skin 3 times daily (before meals) Correction scale for mealtime: 1 units for 50 points greater than 150 mg/dL. Max daily dose: 40 units  Dispense: 39 mL; Refill: 11  - OZEMPIC, 2 MG/DOSE, 8 MG/3ML SOPN; Inject 2 mg into the skin every 7 days  Dispense: 3 mL; Refill: 5  - GLUCOSE MONITOR, 72 HOUR, PHYS INTERP  - Comprehensive Metabolic Panel; Future  - Continuous Blood Gluc Sensor (DEXCOM G6 SENSOR) MISC; Change every 10 days as directed. Dispense: 9 each; Refill: 3  - Continuous Blood Gluc Transmit (DEXCOM G6 TRANSMITTER) MISC; Change every 90 days as directed. Dispense: 1 each; Refill: 3    2. Hypercholesterolemia  Last LDL 28 at goal on rosuvastatin 20 mg. Patient will follow with primary care physician. 3. CAD in native artery  Jardiance and Ozempic are cardioprotective and renal protective. 4. Albuminuria  Patient is taking SGLT2 inhibitor and ARB daily. Recommend Alec Canseco future discussion    5. Nicotine dependence history  -Patient reports that she has stopped smoking and is a good candidate for Afrezza. Follow-up and Dispositions    Return in about 3 months (around 12/8/2022).          Cardiologist: [facility] Dr. Katiuska Boyce      History of Present Illness:         Current Diabetes elevations are postprandial dinner     Pregnancy: s/p menopause       Lipids:    11/16/2021  TC- 98, LDL- 28, VLDL- 20,  HDL- 50, TG- 100       Thyroid:   04/17/2022    TSH    3.320       BP:                 BP Readings from Last 3 Encounters:        01/27/22  112/60     12/13/21  (!) 119/49     12/01/21  132/61         Weight Trends: Wt Readings from Last 3 Encounters:        01/27/22  170 lb (77.1 kg)     12/13/21  169 lb 9.6 oz (76.9 kg)     12/01/21  175 lb (79.4 kg)         Medical/Surgical/Social/Family History: Reviewed in Chart       Medications: Reviewed in chart       Allergies   Patient has no known allergies    Tests or Results Reviewed: (see lab dates below in note) HgbA1C, Cr, GFR, Microalbumin/Cr ratio, Lipid Profile, TSH. /68   Pulse 77   Ht 5' 6\" (1.676 m)   Wt 180 lb (81.6 kg)   SpO2 98%   BMI 29.05 kg/m²     Weight Trends: Wt Readings from Last 3 Encounters:   09/08/22 180 lb (81.6 kg)   08/02/22 176 lb 9.6 oz (80.1 kg)   05/11/22 161 lb (73 kg)       Allergies and Medications: Reviewed in Chart    Review of Systems   Constitutional:  Negative for appetite change, diaphoresis, fatigue and unexpected weight change. HENT:  Negative for trouble swallowing and voice change. Eyes:  Negative for visual disturbance. Respiratory:  Negative for cough, shortness of breath and wheezing. Cardiovascular:  Negative for chest pain, palpitations and leg swelling. Gastrointestinal:  Positive for nausea (intermittent). Negative for abdominal pain, constipation, diarrhea and vomiting. Endocrine: Negative for cold intolerance, heat intolerance, polydipsia, polyphagia and polyuria. Genitourinary:  Negative for difficulty urinating and frequency. Musculoskeletal:  Positive for arthralgias (right hip pain from recent fall at home. ). Negative for back pain and myalgias. Skin:  Negative for pallor.    Neurological:  Negative for dizziness, tremors, weakness, numbness and headaches. Hematological:  Negative for adenopathy. Psychiatric/Behavioral:  Negative for dysphoric mood and sleep disturbance. The patient is not nervous/anxious. Physical Exam  Constitutional:       General: She is not in acute distress. Appearance: Normal appearance. She is normal weight. She is not ill-appearing. HENT:      Head: Normocephalic. Cardiovascular:      Rate and Rhythm: Normal rate and regular rhythm. Pulses: Normal pulses. Pulmonary:      Effort: No respiratory distress. Breath sounds: Normal breath sounds. No wheezing or rhonchi. Chest:      Chest wall: No tenderness. Abdominal:      General: There is no distension. Palpations: Abdomen is soft. Tenderness: There is no abdominal tenderness. There is no guarding. Musculoskeletal:         General: No swelling, tenderness or signs of injury. Cervical back: Neck supple. No tenderness. Right lower leg: No edema. Left lower leg: No edema. Feet:      Right foot:      Skin integrity: Skin integrity normal. No ulcer. Left foot:      Skin integrity: Skin integrity normal. No ulcer. Lymphadenopathy:      Cervical: No cervical adenopathy. Skin:     General: Skin is warm and dry. Findings: No erythema or rash. Neurological:      Mental Status: She is alert. Motor: No weakness.    Psychiatric:         Mood and Affect: Mood normal.         Behavior: Behavior normal.       Orders Placed This Encounter   Procedures    GLUCOSE MONITOR, 72 HOUR, PHYS INTERP    Comprehensive Metabolic Panel     Standing Status:   Future     Standing Expiration Date:   9/8/2023       Current Outpatient Medications   Medication Sig Dispense Refill    Continuous Blood Gluc Transmit (DEXCOM G6 TRANSMITTER) MISC by Does not apply route      Continuous Blood Gluc Sensor (DEXCOM G6 SENSOR) MISC by Does not apply route      LEVEMIR FLEXTOUCH 100 UNIT/ML injection pen Inject 18 Units into the skin nightly Titrate 2 units every 3 days to fasting BG goal of  mg/dL. Max Daily dose of 30 units 27 mL 11    JARDIANCE 10 MG tablet Take 1 tablet by mouth daily 90 tablet 11    metFORMIN (GLUCOPHAGE-XR) 500 MG extended release tablet Take 2 tablets by mouth 2 times daily 360 tablet 11    NOVOLOG FLEXPEN 100 UNIT/ML injection pen Inject 3 Units into the skin 3 times daily (before meals) Correction scale for mealtime: 1 units for 50 points greater than 150 mg/dL. Max daily dose: 40 units 39 mL 11    OZEMPIC, 2 MG/DOSE, 8 MG/3ML SOPN Inject 2 mg into the skin every 7 days 3 mL 5    Continuous Blood Gluc Sensor (DEXCOM G6 SENSOR) MISC Change every 10 days as directed. 9 each 3    Continuous Blood Gluc Transmit (DEXCOM G6 TRANSMITTER) MISC Change every 90 days as directed. 1 each 3    Blood Glucose Monitoring Suppl (ONETOUCH VERIO REFLECT) w/Device KIT Inject 1 Stick into the skin 4 times daily (before meals and nightly) 1 kit 0    Lancets (ONETOUCH DELICA PLUS [id number]) MISC Inject 1 strip into the skin 4 times daily (before meals and nightly) 400 each 3    ESTRACE VAGINAL 0.1 MG/GM vaginal cream Place 2 g vaginally in the morning. Insert 2 gram daily intravaginally for 2 weeks, followed by a maintenance dose of 1 gram two to three times per week.  1 each 11    aspirin 81 MG EC tablet Take 81 mg by mouth      carbonyl iron (FEOSOL) 45 MG TABS Take 1 tablet by mouth      Cholecalciferol 50 MCG (2000 UT) TABS Take 2,000 Units by mouth      cyanocobalamin 100 MCG tablet Take 100 mcg by mouth daily      DULoxetine (CYMBALTA) 30 MG extended release capsule Take 30 mg by mouth daily      Glucagon (GVOKE HYPOPEN 2-PACK) 1 MG/0.2ML SOAJ Inject 1 mg into the skin as needed      losartan (COZAAR) 25 MG tablet Take 25 mg by mouth daily      metoprolol succinate (TOPROL XL) 50 MG extended release tablet Take 50 mg by mouth daily      montelukast (SINGULAIR) 10 MG tablet Take 10 mg by mouth      nitrofurantoin, macrocrystal-monohydrate, (MACROBID) 100 MG capsule Take 100 mg by mouth 2 times daily      nitroGLYCERIN (NITROSTAT) 0.4 MG SL tablet Place 0.4 mg under the tongue      ONETOUCH VERIO strip Inject 1 each into the skin 4 times daily (before meals and nightly) 400 each 3    estradiol (ESTRACE) 1 MG tablet Take 1 mg by mouth      ondansetron (ZOFRAN-ODT) 4 MG disintegrating tablet Take 4 mg by mouth every 8 hours as needed      pantoprazole (PROTONIX) 40 MG tablet Take 40 mg by mouth daily      rosuvastatin (CRESTOR) 20 MG tablet Take 20 mg by mouth      ticagrelor (BRILINTA) 90 MG TABS tablet Take 90 mg by mouth every 12 hours       No current facility-administered medications for this visit. STEFFI Helm NP    On this date 9/8/2022 I have spent 45 minutes reviewing previous notes, test results and face to face with the patient discussing the diagnosis and importance of compliance with the treatment plan as well as documenting on the day of the visit. Portions of this note were generated with the assistance of voice recognition software. As such, some errors in transcription may be present.

## 2022-09-08 NOTE — Clinical Note
Dr. Samia Alfaro, Ms. Lynda Abbott will be requesting a pulmonary function test from your office. I recommended Afrezza mealtime insulin which is inhalable. I recommend a pulmonary function test prior to due to her history of smoking. She reports she is no longer smoking. Patient reports she has stopped smoking. Patient also reports right hip pain after taking a fall at home when her dog ran under her legs and she fell on her right hip. I have recommended she follow-up with you regarding next best steps. Patient did not go to the emergency room for evaluation.  Thank you, Gab

## 2022-09-19 ENCOUNTER — OFFICE VISIT (OUTPATIENT)
Dept: INTERNAL MEDICINE CLINIC | Facility: CLINIC | Age: 62
End: 2022-09-19
Payer: COMMERCIAL

## 2022-09-19 VITALS
DIASTOLIC BLOOD PRESSURE: 57 MMHG | HEART RATE: 81 BPM | WEIGHT: 180.6 LBS | BODY MASS INDEX: 29.02 KG/M2 | SYSTOLIC BLOOD PRESSURE: 130 MMHG | RESPIRATION RATE: 18 BRPM | OXYGEN SATURATION: 97 % | TEMPERATURE: 97.1 F | HEIGHT: 66 IN

## 2022-09-19 DIAGNOSIS — J30.1 SEASONAL ALLERGIC RHINITIS DUE TO POLLEN: ICD-10-CM

## 2022-09-19 DIAGNOSIS — E11.649 TYPE 2 DIABETES MELLITUS WITH HYPOGLYCEMIA WITHOUT COMA, WITHOUT LONG-TERM CURRENT USE OF INSULIN (HCC): ICD-10-CM

## 2022-09-19 DIAGNOSIS — Z11.59 NEED FOR HEPATITIS C SCREENING TEST: ICD-10-CM

## 2022-09-19 DIAGNOSIS — I25.10 CAD IN NATIVE ARTERY: ICD-10-CM

## 2022-09-19 DIAGNOSIS — E78.5 HYPERLIPIDEMIA, UNSPECIFIED HYPERLIPIDEMIA TYPE: Primary | ICD-10-CM

## 2022-09-19 DIAGNOSIS — F41.9 ANXIETY: ICD-10-CM

## 2022-09-19 DIAGNOSIS — Z11.4 SCREENING FOR HIV (HUMAN IMMUNODEFICIENCY VIRUS): ICD-10-CM

## 2022-09-19 LAB
CHOLEST SERPL-MCNC: 126 MG/DL
EST. AVERAGE GLUCOSE BLD GHB EST-MCNC: 154 MG/DL
HBA1C MFR BLD: 7 % (ref 4.8–5.6)
HDLC SERPL-MCNC: 62 MG/DL (ref 40–60)
HDLC SERPL: 2 {RATIO}
HIV 1+2 AB+HIV1 P24 AG SERPL QL IA: NONREACTIVE
HIV 1/2 RESULT COMMENT: NORMAL
LDLC SERPL CALC-MCNC: 41.4 MG/DL
TRIGL SERPL-MCNC: 113 MG/DL (ref 35–150)
VLDLC SERPL CALC-MCNC: 22.6 MG/DL (ref 6–23)

## 2022-09-19 PROCEDURE — 3052F HG A1C>EQUAL 8.0%<EQUAL 9.0%: CPT | Performed by: INTERNAL MEDICINE

## 2022-09-19 PROCEDURE — 99214 OFFICE O/P EST MOD 30 MIN: CPT | Performed by: INTERNAL MEDICINE

## 2022-09-19 RX ORDER — BUPROPION HYDROCHLORIDE 150 MG/1
150 TABLET ORAL EVERY MORNING
Qty: 30 TABLET | Refills: 5 | Status: SHIPPED | OUTPATIENT
Start: 2022-09-19

## 2022-09-19 RX ORDER — ESTRADIOL 0.1 MG/G
2 CREAM VAGINAL DAILY
Qty: 1 EACH | Refills: 11 | Status: CANCELLED | OUTPATIENT
Start: 2022-09-19

## 2022-09-19 SDOH — ECONOMIC STABILITY: FOOD INSECURITY: WITHIN THE PAST 12 MONTHS, THE FOOD YOU BOUGHT JUST DIDN'T LAST AND YOU DIDN'T HAVE MONEY TO GET MORE.: NEVER TRUE

## 2022-09-19 SDOH — ECONOMIC STABILITY: FOOD INSECURITY: WITHIN THE PAST 12 MONTHS, YOU WORRIED THAT YOUR FOOD WOULD RUN OUT BEFORE YOU GOT MONEY TO BUY MORE.: NEVER TRUE

## 2022-09-19 ASSESSMENT — ENCOUNTER SYMPTOMS
NAUSEA: 0
COUGH: 0
WHEEZING: 0
DIARRHEA: 0
SHORTNESS OF BREATH: 0
VOMITING: 0
BLOOD IN STOOL: 0
CONSTIPATION: 0

## 2022-09-19 ASSESSMENT — PATIENT HEALTH QUESTIONNAIRE - PHQ9
2. FEELING DOWN, DEPRESSED OR HOPELESS: 0
SUM OF ALL RESPONSES TO PHQ QUESTIONS 1-9: 0
SUM OF ALL RESPONSES TO PHQ9 QUESTIONS 1 & 2: 0
SUM OF ALL RESPONSES TO PHQ QUESTIONS 1-9: 0
1. LITTLE INTEREST OR PLEASURE IN DOING THINGS: 0

## 2022-09-19 ASSESSMENT — SOCIAL DETERMINANTS OF HEALTH (SDOH): HOW HARD IS IT FOR YOU TO PAY FOR THE VERY BASICS LIKE FOOD, HOUSING, MEDICAL CARE, AND HEATING?: NOT HARD AT ALL

## 2022-09-19 NOTE — PROGRESS NOTES
2022 5:51 PM  Location:[facility] [address] [facility]  [place]  Patient #:  [id number]  YOB: 1960            History of Present Illness     Chief Complaint   Patient presents with    Follow-up     6 month  Sinus headache      Headache     Complains with a headaches x 3 days - thinks this is sinus related. Ms. Armen Yu is a 65 y.o. female  who presents for the above. Is confused by her endocrinology provider and wonders about seeing someone different. Has been putting on a significant amount of weight. Thinks this is related to stress. She feels anxious and is a stress eater. Other  Associated symptoms include congestion and headaches. Pertinent negatives include no chest pain, chills, coughing, fever, nausea or vomiting.         No Known Allergies  Past Medical History:   Diagnosis Date    GERD (gastroesophageal reflux disease)     Hypercholesterolemia     Type 2 diabetes mellitus (HCC)     urinary tract infection      Social History     Socioeconomic History    Marital status:      Spouse name: None    Number of children: None    Years of education: None    Highest education level: None   Tobacco Use    Smoking status: Former     Packs/day: 1.00     Types: Cigarettes     Quit date: 2021     Years since quittin.3    Smokeless tobacco: Never   Substance and Sexual Activity    Alcohol use: Not Currently    Drug use: Not Currently     Social Determinants of Health     Financial Resource Strain: Low Risk     Difficulty of Paying Living Expenses: Not hard at all   Food Insecurity: No Food Insecurity    Worried About Running Out of Food in the Last Year: Never true    Ran Out of Food in the Last Year: Never true     Past Surgical History:   Procedure Laterality Date    APPENDECTOMY      CHOLECYSTECTOMY      HYSTERECTOMY (CERVIX STATUS UNKNOWN)      ovaries removed    PTCA      times 2     Current Outpatient Medications   Medication Sig Dispense Refill    buPROPion (WELLBUTRIN XL) 150 MG extended release tablet Take 1 tablet by mouth every morning 30 tablet 5    Continuous Blood Gluc Transmit (DEXCOM G6 TRANSMITTER) MISC by Does not apply route      Continuous Blood Gluc Sensor (DEXCOM G6 SENSOR) MISC by Does not apply route      LEVEMIR FLEXTOUCH 100 UNIT/ML injection pen Inject 18 Units into the skin nightly Titrate 2 units every 3 days to fasting BG goal of  mg/dL. Max Daily dose of 30 units 27 mL 11    JARDIANCE 10 MG tablet Take 1 tablet by mouth daily 90 tablet 11    metFORMIN (GLUCOPHAGE-XR) 500 MG extended release tablet Take 2 tablets by mouth 2 times daily 360 tablet 11    NOVOLOG FLEXPEN 100 UNIT/ML injection pen Inject 3 Units into the skin 3 times daily (before meals) Correction scale for mealtime: 1 units for 50 points greater than 150 mg/dL. Max daily dose: 40 units 39 mL 11    OZEMPIC, 2 MG/DOSE, 8 MG/3ML SOPN Inject 2 mg into the skin every 7 days 3 mL 5    Continuous Blood Gluc Sensor (DEXCOM G6 SENSOR) MISC Change every 10 days as directed. 9 each 3    Continuous Blood Gluc Transmit (DEXCOM G6 TRANSMITTER) MISC Change every 90 days as directed.  1 each 3    Blood Glucose Monitoring Suppl (ONETOUCH VERIO REFLECT) w/Device KIT Inject 1 Stick into the skin 4 times daily (before meals and nightly) 1 kit 0    Lancets (ONETOUCH DELICA PLUS [id number]) MISC Inject 1 strip into the skin 4 times daily (before meals and nightly) 400 each 3    ONETOUCH VERIO strip Inject 1 each into the skin 4 times daily (before meals and nightly) 400 each 3    aspirin 81 MG EC tablet Take 81 mg by mouth      carbonyl iron (FEOSOL) 45 MG TABS Take 1 tablet by mouth      Cholecalciferol 50 MCG (2000 UT) TABS Take 2,000 Units by mouth      cyanocobalamin 100 MCG tablet Take 100 mcg by mouth daily      DULoxetine (CYMBALTA) 30 MG extended release capsule Take 30 mg by mouth daily      estradiol (ESTRACE) 1 MG tablet Take 1 mg by mouth      Glucagon ([place] 2-PACK) 1 MG/0.2ML SOAJ Inject 1 mg into the skin as needed      losartan (COZAAR) 25 MG tablet Take 25 mg by mouth daily      metoprolol succinate (TOPROL XL) 50 MG extended release tablet Take 50 mg by mouth daily      montelukast (SINGULAIR) 10 MG tablet Take 10 mg by mouth      nitroGLYCERIN (NITROSTAT) 0.4 MG SL tablet Place 0.4 mg under the tongue      ondansetron (ZOFRAN-ODT) 4 MG disintegrating tablet Take 4 mg by mouth every 8 hours as needed      pantoprazole (PROTONIX) 40 MG tablet Take 40 mg by mouth daily      rosuvastatin (CRESTOR) 20 MG tablet Take 20 mg by mouth      ticagrelor (BRILINTA) 90 MG TABS tablet Take 90 mg by mouth every 12 hours       No current facility-administered medications for this visit. Health Maintenance   Topic Date Due    HIV screen  Never done    Diabetic retinal exam  Never done    Hepatitis C screen  Never done    Cervical cancer screen  Never done    COVID-19 Vaccine (3 - Booster for Дмитрий series) 03/12/2022    Flu vaccine (1) 09/01/2022    Lipids  11/16/2022    Breast cancer screen  11/23/2022    Diabetic microalbuminuria test  02/25/2023    A1C test (Diabetic or Prediabetic)  08/02/2023    Diabetic foot exam  09/19/2023    Depression Screen  09/19/2023    Colorectal Cancer Screen  06/02/2025    DTaP/Tdap/Td vaccine (2 - Td or Tdap) 12/13/2031    Shingles vaccine  Completed    Pneumococcal 0-64 years Vaccine  Completed    Hepatitis A vaccine  Aged Out    Hib vaccine  Aged Out    Meningococcal (ACWY) vaccine  Aged Out     Family History   Problem Relation Age of Onset    Coronary Art Dis Neg Hx     Diabetes Maternal Grandfather     Diabetes Mother              Review of Systems  Review of Systems   Constitutional:  Positive for unexpected weight change. Negative for chills and fever. HENT:  Positive for congestion. Has tooth pain, facial pain   Respiratory:  Negative for cough, shortness of breath and wheezing.     Cardiovascular:  Negative for chest pain, palpitations and leg swelling. Gastrointestinal:  Negative for blood in stool, constipation, diarrhea, nausea and vomiting. Neurological:  Positive for headaches. Psychiatric/Behavioral:  The patient is nervous/anxious. BP (!) 130/57 (Site: Left Upper Arm, Position: Sitting, Cuff Size: Large Adult)   Pulse 81   Temp 97.1 °F (36.2 °C) (Temporal)   Resp 18   Ht 5' 6\" (1.676 m)   Wt 180 lb 9.6 oz (81.9 kg)   SpO2 97% Comment: ra  BMI 29.15 kg/m²       Physical Exam    Physical Exam  Constitutional:       Appearance: Normal appearance. She is not ill-appearing. HENT:      Head: Normocephalic. Neck:      Vascular: No carotid bruit. Cardiovascular:      Rate and Rhythm: Normal rate and regular rhythm. Pulmonary:      Effort: Pulmonary effort is normal.      Breath sounds: Normal breath sounds. No wheezing. Abdominal:      General: Abdomen is flat. Palpations: Abdomen is soft. Tenderness: There is no abdominal tenderness. Musculoskeletal:      Right lower leg: No edema. Left lower leg: No edema. Neurological:      Mental Status: She is alert and oriented to person, place, and time. Deep Tendon Reflexes:      Reflex Scores:       Patellar reflexes are 2+ on the right side and 2+ on the left side.   Psychiatric:         Mood and Affect: Mood normal.         Behavior: Behavior normal.   Diabetic foot exam:   Left Foot:   Visual Exam: normal   Pulse DP: 2+ (normal)   Filament test: normal sensation   Vibratory Sensation: normal  Right Foot:   Visual Exam: normal   Pulse DP: 2+ (normal)   Filament test: normal sensation   Vibratory Sensation: normal       Assessment & Plan    Current Outpatient Medications   Medication Sig Dispense Refill    buPROPion (WELLBUTRIN XL) 150 MG extended release tablet Take 1 tablet by mouth every morning 30 tablet 5    Continuous Blood Gluc Transmit (DEXCOM G6 TRANSMITTER) MISC by Does not apply route      Continuous Blood Gluc Sensor (DEXCOM G6 SENSOR) MISC by Does not apply route      LEVEMIR FLEXTOUCH 100 UNIT/ML injection pen Inject 18 Units into the skin nightly Titrate 2 units every 3 days to fasting BG goal of  mg/dL. Max Daily dose of 30 units 27 mL 11    JARDIANCE 10 MG tablet Take 1 tablet by mouth daily 90 tablet 11    metFORMIN (GLUCOPHAGE-XR) 500 MG extended release tablet Take 2 tablets by mouth 2 times daily 360 tablet 11    NOVOLOG FLEXPEN 100 UNIT/ML injection pen Inject 3 Units into the skin 3 times daily (before meals) Correction scale for mealtime: 1 units for 50 points greater than 150 mg/dL. Max daily dose: 40 units 39 mL 11    OZEMPIC, 2 MG/DOSE, 8 MG/3ML SOPN Inject 2 mg into the skin every 7 days 3 mL 5    Continuous Blood Gluc Sensor (DEXCOM G6 SENSOR) MISC Change every 10 days as directed. 9 each 3    Continuous Blood Gluc Transmit (DEXCOM G6 TRANSMITTER) MISC Change every 90 days as directed.  1 each 3    Blood Glucose Monitoring Suppl (ONETOUCH VERIO REFLECT) w/Device KIT Inject 1 Stick into the skin 4 times daily (before meals and nightly) 1 kit 0    Lancets (ONETOUCH DELICA PLUS [id number]) MISC Inject 1 strip into the skin 4 times daily (before meals and nightly) 400 each 3    ONETOUCH VERIO strip Inject 1 each into the skin 4 times daily (before meals and nightly) 400 each 3    aspirin 81 MG EC tablet Take 81 mg by mouth      carbonyl iron (FEOSOL) 45 MG TABS Take 1 tablet by mouth      Cholecalciferol 50 MCG (2000 UT) TABS Take 2,000 Units by mouth      cyanocobalamin 100 MCG tablet Take 100 mcg by mouth daily      DULoxetine (CYMBALTA) 30 MG extended release capsule Take 30 mg by mouth daily      estradiol (ESTRACE) 1 MG tablet Take 1 mg by mouth      Glucagon (GVOKE HYPOPEN 2-PACK) 1 MG/0.2ML SOAJ Inject 1 mg into the skin as needed      losartan (COZAAR) 25 MG tablet Take 25 mg by mouth daily      metoprolol succinate (TOPROL XL) 50 MG extended release tablet Take 50 mg by mouth daily      montelukast (SINGULAIR) 10 MG tablet Take 10 mg by mouth      nitroGLYCERIN (NITROSTAT) 0.4 MG SL tablet Place 0.4 mg under the tongue      ondansetron (ZOFRAN-ODT) 4 MG disintegrating tablet Take 4 mg by mouth every 8 hours as needed      pantoprazole (PROTONIX) 40 MG tablet Take 40 mg by mouth daily      rosuvastatin (CRESTOR) 20 MG tablet Take 20 mg by mouth      ticagrelor (BRILINTA) 90 MG TABS tablet Take 90 mg by mouth every 12 hours       No current facility-administered medications for this visit. Orders Placed This Encounter   Procedures    Lipid Panel     Standing Status:   Future     Number of Occurrences:   1     Standing Expiration Date:   9/19/2023    Hemoglobin A1C     Standing Status:   Future     Number of Occurrences:   1     Standing Expiration Date:   9/19/2023    Hepatitis C Ab, Rflx to Qt by PCR     Standing Status:   Future     Number of Occurrences:   1     Standing Expiration Date:   9/19/2023    HIV 1/2 Ag/Ab, 4TH Generation,W Rflx Confirm     Standing Status:   Future     Number of Occurrences:   1     Standing Expiration Date:   9/19/2023       Orders Placed This Encounter   Medications    buPROPion (WELLBUTRIN XL) 150 MG extended release tablet     Sig: Take 1 tablet by mouth every morning     Dispense:  30 tablet     Refill:  5       Medications Discontinued During This Encounter   Medication Reason    nitrofurantoin, macrocrystal-monohydrate, (MACROBID) 100 MG capsule LIST CLEANUP    ESTRACE VAGINAL 0.1 MG/GM vaginal cream Therapy completed        Diagnosis Orders   1. Hyperlipidemia, unspecified hyperlipidemia type  Lipid Panel    Lipid Panel      2. Type 2 diabetes mellitus with hypoglycemia without coma, without long-term current use of insulin (HCC)  Hemoglobin A1C    Hemoglobin A1C      3. Anxiety        4. CAD in native artery        5.  Need for hepatitis C screening test  Hepatitis C Ab, Rflx to Qt by PCR    Hepatitis C Ab, Rflx to Qt by PCR      6. Screening for HIV (human immunodeficiency virus) HIV 1/2 Ag/Ab, 4TH Generation,W Rflx Confirm    HIV 1/2 Ag/Ab, 4TH Generation,W Rflx Confirm      7. Seasonal allergic rhinitis due to pollen           Reviewed records from Endo. Her biggest complaint is confusion related to some of the instructions he has been provided. Considering she has lost those and her provider, urged her to explore other options at that endo group. Reassured her based on her hemoglobin A1c that the nurse practitioner has been managing her diabetes well. We will provide Wellbutrin to hopefully help some with appetite control as well as anxiety. We will maintain close follow-up related to this. Will discuss labs over the phone. Recommended 30 minutes of aerobic exercise at least 4-5 days weekly for physical as well as emotional reasons. Urged her to restart Flonase for her seasonal allergies. No evidence of a secondary sinusitis at this point. Follow up as documented or earlier as needed. Return in about 2 months (around 11/19/2022).           Farhan Gotti MD

## 2022-09-21 LAB
HCV AB S/CO SERPL IA: <0.1 S/CO RATIO (ref 0–0.9)
HCV AB SERPL QL IA: NORMAL

## 2022-10-14 RX ORDER — MONTELUKAST SODIUM 10 MG/1
TABLET ORAL
Qty: 90 TABLET | Refills: 3 | Status: SHIPPED | OUTPATIENT
Start: 2022-10-14

## 2022-11-16 ENCOUNTER — OFFICE VISIT (OUTPATIENT)
Dept: CARDIOLOGY CLINIC | Age: 62
End: 2022-11-16
Payer: COMMERCIAL

## 2022-11-16 VITALS
HEART RATE: 66 BPM | SYSTOLIC BLOOD PRESSURE: 122 MMHG | DIASTOLIC BLOOD PRESSURE: 60 MMHG | WEIGHT: 183.2 LBS | BODY MASS INDEX: 29.44 KG/M2 | HEIGHT: 66 IN

## 2022-11-16 DIAGNOSIS — E78.2 MIXED HYPERLIPIDEMIA: ICD-10-CM

## 2022-11-16 DIAGNOSIS — I10 ESSENTIAL HYPERTENSION: ICD-10-CM

## 2022-11-16 DIAGNOSIS — I25.5 ISCHEMIC CARDIOMYOPATHY: ICD-10-CM

## 2022-11-16 DIAGNOSIS — I25.10 CORONARY ARTERY DISEASE INVOLVING NATIVE CORONARY ARTERY OF NATIVE HEART WITHOUT ANGINA PECTORIS: Primary | ICD-10-CM

## 2022-11-16 PROCEDURE — 3074F SYST BP LT 130 MM HG: CPT | Performed by: INTERNAL MEDICINE

## 2022-11-16 PROCEDURE — 99214 OFFICE O/P EST MOD 30 MIN: CPT | Performed by: INTERNAL MEDICINE

## 2022-11-16 PROCEDURE — 3078F DIAST BP <80 MM HG: CPT | Performed by: INTERNAL MEDICINE

## 2022-11-16 RX ORDER — LOSARTAN POTASSIUM 25 MG/1
25 TABLET ORAL DAILY
Qty: 90 TABLET | Refills: 3 | Status: SHIPPED | OUTPATIENT
Start: 2022-11-16

## 2022-11-16 RX ORDER — ROSUVASTATIN CALCIUM 20 MG/1
20 TABLET, COATED ORAL DAILY
Qty: 90 TABLET | Refills: 3 | Status: SHIPPED | OUTPATIENT
Start: 2022-11-16

## 2022-11-16 RX ORDER — METOPROLOL SUCCINATE 50 MG/1
50 TABLET, EXTENDED RELEASE ORAL DAILY
Qty: 90 TABLET | Refills: 3 | Status: SHIPPED | OUTPATIENT
Start: 2022-11-16

## 2022-11-16 RX ORDER — NITROGLYCERIN 0.4 MG/1
0.4 TABLET SUBLINGUAL EVERY 5 MIN PRN
Qty: 25 TABLET | Refills: 3 | Status: SHIPPED | OUTPATIENT
Start: 2022-11-16

## 2022-11-16 ASSESSMENT — ENCOUNTER SYMPTOMS
NAUSEA: 0
HEMOPTYSIS: 0
VOMITING: 0
BLOATING: 0
COUGH: 0
BLURRED VISION: 0
ORTHOPNEA: 0
BACK PAIN: 0
SHORTNESS OF BREATH: 0
DOUBLE VISION: 0
ABDOMINAL PAIN: 0

## 2022-11-16 NOTE — PROGRESS NOTES
[facility] CARDIOLOGY  [address] [facility], [address]  [address] [facility], [address] [place] [address]  PHONE: [phone]    22    NAME:  Arden Sinha  : 1960  MRN: [id number]         SUBJECTIVE:   Arden Sinha is a 65 y.o. female seen for a visit regarding the following:     Chief Complaint   Patient presents with    Coronary Artery Disease     6 month follow up       HPI:      History of coronary disease status post PCI to  prox to dLAD from 2021 (~60mm stenting). Hx of DM (states last A1C ~9 from ), HLP. No prior CVA/TIA. Coronary angiogram  triggered due to initial echo with severe hypokinesis of the mid to apical septal/apical inferior wall with ejection fraction of around 40% from 2021. Repeat from 10/6/21 at the lower limit of normal with an ejection fraction of around 50%. Moderate  hypokinesis of the apical inferior wall/mild to moderate hypokinesis of the distal septum. Noted carotid Dopplers done per PCP on 2021 with no significant stenosis. No new issues since last visit. Occasional GERD symptoms. No exertional chest discomfort. States some increased weight gain since she quit smoking. Prior-- Denies any chest discomfort/dyspnea on exertion. No prior chest discomfort pre-PCI. States no significant changes after her intervention. Prior diarrhea resolved and has reverted back to her Lipitor 40 mg daily but states some issues with leg cramps  in the morning. Changed over to Crestor 20 mg daily and subsequently attempted a statin vacation with no changes in fatigue. Back on it. Has been seeing endocrinology since 2022 with improved diabetes control. Last A1c from 2022 at 7.0      Prior--initial evaluation from 2021, states had carotids at her PCPs office due to a noted bruit and was told not significant enough. No records. No labs. Subsequently set up an appointment on her own for further evaluation with cardiology. Some issues with CP-with stress.  At times some sx with certain meals. Walks about ~1 mile/day; no exertional sx. Ischemic cardiomyopathy- controlled, hyperlipidemia-controlled, tobacco abuse-controlled    Past Medical History, Past Surgical History, Family history, Social History, and Medications were all reviewed with the patient today and updated as necessary. No Known Allergies  Patient Active Problem List   Diagnosis    CAD in native artery    Type 2 diabetes mellitus ([facility]    Hyperlipidemia    Diabetes mellitus ([facility]    Ischemic cardiomyopathy    Hypercholesterolemia    SIRS (systemic inflammatory response syndrome) (HCC)    Albuminuria     Past Medical History:   Diagnosis Date    GERD (gastroesophageal reflux disease)     Hypercholesterolemia     Type 2 diabetes mellitus ([facility]     urinary tract infection      Past Surgical History:   Procedure Laterality Date    APPENDECTOMY      CHOLECYSTECTOMY      HYSTERECTOMY (CERVIX STATUS UNKNOWN)      ovaries removed    PTCA      times 2     Family History   Problem Relation Age of Onset    Coronary Art Dis Neg Hx     Diabetes Maternal Grandfather     Diabetes Mother      Social History     Tobacco Use    Smoking status: Former     Packs/day: 1.00     Types: Cigarettes     Quit date: 2021     Years since quittin.4    Smokeless tobacco: Never   Substance Use Topics    Alcohol use: Not Currently     Current Outpatient Medications   Medication Sig Dispense Refill    ticagrelor (BRILINTA) 90 MG TABS tablet Take 1 tablet by mouth in the morning and 1 tablet in the evening.  180 tablet 3    rosuvastatin (CRESTOR) 20 MG tablet Take 1 tablet by mouth daily 90 tablet 3    nitroGLYCERIN (NITROSTAT) 0.4 MG SL tablet Place 1 tablet under the tongue every 5 minutes as needed for Chest pain 25 tablet 3    metoprolol succinate (TOPROL XL) 50 MG extended release tablet Take 1 tablet by mouth daily 90 tablet 3    losartan (COZAAR) 25 MG tablet Take 1 tablet by mouth daily Indications: Taking 1/2 a tablet 90 tablet 3    montelukast (SINGULAIR) 10 MG tablet TAKE 1 TABLET BY MOUTH NIGHTLY 90 tablet 3    buPROPion (WELLBUTRIN XL) 150 MG extended release tablet Take 1 tablet by mouth every morning 30 tablet 5    Continuous Blood Gluc Transmit (DEXCOM G6 TRANSMITTER) MISC by Does not apply route      Continuous Blood Gluc Sensor (DEXCOM G6 SENSOR) MISC by Does not apply route      LEVEMIR FLEXTOUCH 100 UNIT/ML injection pen Inject 18 Units into the skin nightly Titrate 2 units every 3 days to fasting BG goal of  mg/dL. Max Daily dose of 30 units 27 mL 11    JARDIANCE 10 MG tablet Take 1 tablet by mouth daily 90 tablet 11    metFORMIN (GLUCOPHAGE-XR) 500 MG extended release tablet Take 2 tablets by mouth 2 times daily 360 tablet 11    NOVOLOG FLEXPEN 100 UNIT/ML injection pen Inject 3 Units into the skin 3 times daily (before meals) Correction scale for mealtime: 1 units for 50 points greater than 150 mg/dL. Max daily dose: 40 units 39 mL 11    OZEMPIC, 2 MG/DOSE, 8 MG/3ML SOPN Inject 2 mg into the skin every 7 days 3 mL 5    Continuous Blood Gluc Sensor (DEXCOM G6 SENSOR) MISC Change every 10 days as directed. 9 each 3    Continuous Blood Gluc Transmit (DEXCOM G6 TRANSMITTER) MISC Change every 90 days as directed.  1 each 3    Blood Glucose Monitoring Suppl (ONETOUCH VERIO REFLECT) w/Device KIT Inject 1 Stick into the skin 4 times daily (before meals and nightly) 1 kit 0    Lancets (ONETOUCH DELICA PLUS [id number]) MISC Inject 1 strip into the skin 4 times daily (before meals and nightly) 400 each 3    ONETOUCH VERIO strip Inject 1 each into the skin 4 times daily (before meals and nightly) 400 each 3    aspirin 81 MG EC tablet Take 81 mg by mouth      carbonyl iron (FEOSOL) 45 MG TABS Take 1 tablet by mouth      Cholecalciferol 50 MCG (2000 UT) TABS Take 2,000 Units by mouth      cyanocobalamin 100 MCG tablet Take 100 mcg by mouth daily      DULoxetine (CYMBALTA) 30 MG extended release capsule Take 30 mg by mouth daily      estradiol (ESTRACE) 1 MG tablet Take 1 mg by mouth      Glucagon (GVOKE HYPOPEN 2-PACK) 1 MG/0.2ML SOAJ Inject 1 mg into the skin as needed      ondansetron (ZOFRAN-ODT) 4 MG disintegrating tablet Take 4 mg by mouth every 8 hours as needed      pantoprazole (PROTONIX) 40 MG tablet Take 40 mg by mouth daily       No current facility-administered medications for this visit. Review of Systems   Constitutional: Negative for chills, decreased appetite, fever, malaise/fatigue and weight gain. HENT:  Negative for nosebleeds. Eyes:  Negative for blurred vision and double vision. Cardiovascular:  Negative for chest pain, claudication, dyspnea on exertion, leg swelling, orthopnea, palpitations, paroxysmal nocturnal dyspnea and syncope. Respiratory:  Negative for cough, hemoptysis and shortness of breath. Endocrine: Negative for cold intolerance and heat intolerance. Hematologic/Lymphatic: Negative for bleeding problem. Skin:  Negative for rash. Musculoskeletal:  Negative for back pain, joint pain, muscle weakness and myalgias. Gastrointestinal:  Negative for bloating, abdominal pain, nausea and vomiting. Genitourinary:  Negative for dysuria. Neurological:  Negative for dizziness, light-headedness and weakness. Psychiatric/Behavioral:  Negative for altered mental status. PHYSICAL EXAM:    /60   Pulse 66   Ht 5' 6\" (1.676 m)   Wt 183 lb 3.2 oz (83.1 kg)   BMI 29.57 kg/m²      Physical Exam  Constitutional:       Appearance: Normal appearance. HENT:      Head: Normocephalic and atraumatic. Mouth/Throat:      Mouth: Mucous membranes are moist.   Eyes:      Pupils: Pupils are equal, round, and reactive to light. Neck:      Vascular: Carotid bruit (rt) present. Cardiovascular:      Rate and Rhythm: Normal rate and regular rhythm. Pulses: Normal pulses. Heart sounds: Murmur (low grade MELISSA at RUSB) heard.    Pulmonary:      Effort: Pulmonary effort is normal.      Breath sounds: Normal breath sounds. Abdominal:      General: There is no distension. Palpations: Abdomen is soft. Tenderness: There is no abdominal tenderness. Musculoskeletal:         General: No swelling. Cervical back: Normal range of motion. Skin:     General: Skin is warm and dry. Neurological:      General: No focal deficit present. Mental Status: She is alert. Psychiatric:         Mood and Affect: Mood normal.       Medical problems and test results were reviewed with the patient today. No results found for this or any previous visit (from the past 672 hour(s)). Lab Results   Component Value Date/Time    CHOL 126 09/19/2022 10:30 AM    HDL 62 09/19/2022 10:30 AM       No results found for any visits on 11/16/22. ASSESSMENT and Jesus Breeding was seen today for coronary artery disease. Diagnoses and all orders for this visit:    Coronary artery disease involving native coronary artery of native heart without angina pectoris    Ischemic cardiomyopathy    Mixed hyperlipidemia    Essential hypertension    Other orders  -     ticagrelor (BRILINTA) 90 MG TABS tablet; Take 1 tablet by mouth in the morning and 1 tablet in the evening.  -     rosuvastatin (CRESTOR) 20 MG tablet; Take 1 tablet by mouth daily  -     nitroGLYCERIN (NITROSTAT) 0.4 MG SL tablet; Place 1 tablet under the tongue every 5 minutes as needed for Chest pain  -     metoprolol succinate (TOPROL XL) 50 MG extended release tablet; Take 1 tablet by mouth daily  -     losartan (COZAAR) 25 MG tablet; Take 1 tablet by mouth daily Indications: Taking 1/2 a tablet      Overall Impression    Ischemic cardiomyopathy-improved on last echocardiogram; persistent wall motion abnormalities. Changed Coreg over to Toprol-XL with improved fatigue. Dose decreased to 50 mg daily and continue current  dosing. Continue losartan. Coronary disease-stable symptoms.   Given prior extensive  stenting, discussed indefinite dual antiplatelet therapy     Carotid bruit-noted carotid Dopplers from PCP with no significant coronary disease. Tobacco abuse-extensively stressed continued cessation; quit 7/1/21. Hyperlipidemia-intolerance to Lipitor 40 mg daily with some issues with cramps, tolerating Crestor 20 mg daily. Other-improved diabetes mellitus control and defer to endocrinology. Return in about 9 months (around 8/16/2023).      Eloisa Condon MD  11/16/2022  9:05 AM

## 2022-11-21 ENCOUNTER — OFFICE VISIT (OUTPATIENT)
Dept: INTERNAL MEDICINE CLINIC | Facility: CLINIC | Age: 62
End: 2022-11-21
Payer: COMMERCIAL

## 2022-11-21 VITALS
HEART RATE: 89 BPM | SYSTOLIC BLOOD PRESSURE: 119 MMHG | TEMPERATURE: 97.4 F | OXYGEN SATURATION: 97 % | HEIGHT: 66 IN | WEIGHT: 182 LBS | BODY MASS INDEX: 29.25 KG/M2 | DIASTOLIC BLOOD PRESSURE: 68 MMHG

## 2022-11-21 DIAGNOSIS — E78.00 HYPERCHOLESTEROLEMIA: ICD-10-CM

## 2022-11-21 DIAGNOSIS — E11.59 TYPE 2 DIABETES MELLITUS WITH OTHER CIRCULATORY COMPLICATION, WITH LONG-TERM CURRENT USE OF INSULIN (HCC): ICD-10-CM

## 2022-11-21 DIAGNOSIS — Z79.4 TYPE 2 DIABETES MELLITUS WITH OTHER CIRCULATORY COMPLICATION, WITH LONG-TERM CURRENT USE OF INSULIN (HCC): ICD-10-CM

## 2022-11-21 DIAGNOSIS — J06.9 VIRAL URI: Primary | ICD-10-CM

## 2022-11-21 DIAGNOSIS — F41.9 ANXIETY: ICD-10-CM

## 2022-11-21 PROCEDURE — 99214 OFFICE O/P EST MOD 30 MIN: CPT | Performed by: INTERNAL MEDICINE

## 2022-11-21 PROCEDURE — 3051F HG A1C>EQUAL 7.0%<8.0%: CPT | Performed by: INTERNAL MEDICINE

## 2022-11-21 RX ORDER — PSEUDOEPHEDRINE HCL 30 MG
30 TABLET ORAL 2 TIMES DAILY PRN
Refills: 1 | COMMUNITY
Start: 2022-11-21 | End: 2023-11-21

## 2022-11-21 RX ORDER — BUPROPION HYDROCHLORIDE 300 MG/1
300 TABLET ORAL EVERY MORNING
Qty: 90 TABLET | Refills: 3 | Status: SHIPPED | OUTPATIENT
Start: 2022-11-21

## 2022-11-21 ASSESSMENT — ENCOUNTER SYMPTOMS
COUGH: 0
NAUSEA: 0
BLOOD IN STOOL: 0
VOMITING: 0
SHORTNESS OF BREATH: 0
DIARRHEA: 0
CONSTIPATION: 0
WHEEZING: 0

## 2022-11-21 ASSESSMENT — PATIENT HEALTH QUESTIONNAIRE - PHQ9
1. LITTLE INTEREST OR PLEASURE IN DOING THINGS: 0
SUM OF ALL RESPONSES TO PHQ9 QUESTIONS 1 & 2: 0
2. FEELING DOWN, DEPRESSED OR HOPELESS: 0
SUM OF ALL RESPONSES TO PHQ QUESTIONS 1-9: 0

## 2022-11-21 NOTE — PROGRESS NOTES
2022 11:50 AM  Location:[facility] [address] [facility]  [place]  Patient #:  [id number]  YOB: 1960            History of Present Illness     Chief Complaint   Patient presents with    Follow-up     Pt presents to the office today for their two month follow-up. Ms. Naila Eugene is a 65 y.o. female  who presents for the above. About two days ago developed pain in her right ear, right face and right throat with swallowing. Notes that the Wellbutrin may have helped some with her appetite. Does not think it has made much of a difference as far as her anxiety.          No Known Allergies  Past Medical History:   Diagnosis Date    GERD (gastroesophageal reflux disease)     Hypercholesterolemia     Type 2 diabetes mellitus (HCC)     urinary tract infection      Social History     Socioeconomic History    Marital status:      Spouse name: None    Number of children: None    Years of education: None    Highest education level: None   Tobacco Use    Smoking status: Former     Packs/day: 1.00     Types: Cigarettes     Quit date: 2021     Years since quittin.4    Smokeless tobacco: Never   Substance and Sexual Activity    Alcohol use: Not Currently    Drug use: Not Currently     Social Determinants of Health     Financial Resource Strain: Low Risk     Difficulty of Paying Living Expenses: Not hard at all   Food Insecurity: No Food Insecurity    Worried About Running Out of Food in the Last Year: Never true    Ran Out of Food in the Last Year: Never true     Past Surgical History:   Procedure Laterality Date    APPENDECTOMY      CHOLECYSTECTOMY      HYSTERECTOMY (CERVIX STATUS UNKNOWN)      ovaries removed    PTCA      times 2     Current Outpatient Medications   Medication Sig Dispense Refill    buPROPion (WELLBUTRIN XL) 300 MG extended release tablet Take 1 tablet by mouth every morning 90 tablet 3    pseudoephedrine (DECONGESTANT) 30 MG tablet Take 1 tablet by mouth 2 times daily as needed for Congestion  1    ticagrelor (BRILINTA) 90 MG TABS tablet Take 1 tablet by mouth in the morning and 1 tablet in the evening. 180 tablet 3    rosuvastatin (CRESTOR) 20 MG tablet Take 1 tablet by mouth daily 90 tablet 3    nitroGLYCERIN (NITROSTAT) 0.4 MG SL tablet Place 1 tablet under the tongue every 5 minutes as needed for Chest pain 25 tablet 3    metoprolol succinate (TOPROL XL) 50 MG extended release tablet Take 1 tablet by mouth daily 90 tablet 3    losartan (COZAAR) 25 MG tablet Take 1 tablet by mouth daily Indications: Taking 1/2 a tablet (Patient taking differently: Take 12.5 mg by mouth daily Indications: Taking 1/2 a tablet) 90 tablet 3    montelukast (SINGULAIR) 10 MG tablet TAKE 1 TABLET BY MOUTH NIGHTLY 90 tablet 3    Continuous Blood Gluc Transmit (DEXCOM G6 TRANSMITTER) MISC by Does not apply route      Continuous Blood Gluc Sensor (DEXCOM G6 SENSOR) MISC by Does not apply route      LEVEMIR FLEXTOUCH 100 UNIT/ML injection pen Inject 18 Units into the skin nightly Titrate 2 units every 3 days to fasting BG goal of  mg/dL. Max Daily dose of 30 units 27 mL 11    JARDIANCE 10 MG tablet Take 1 tablet by mouth daily 90 tablet 11    metFORMIN (GLUCOPHAGE-XR) 500 MG extended release tablet Take 2 tablets by mouth 2 times daily 360 tablet 11    NOVOLOG FLEXPEN 100 UNIT/ML injection pen Inject 3 Units into the skin 3 times daily (before meals) Correction scale for mealtime: 1 units for 50 points greater than 150 mg/dL. Max daily dose: 40 units 39 mL 11    OZEMPIC, 2 MG/DOSE, 8 MG/3ML SOPN Inject 2 mg into the skin every 7 days 3 mL 5    Continuous Blood Gluc Sensor (DEXCOM G6 SENSOR) MISC Change every 10 days as directed. 9 each 3    Continuous Blood Gluc Transmit (DEXCOM G6 TRANSMITTER) MISC Change every 90 days as directed.  1 each 3    Blood Glucose Monitoring Suppl (ONETOUCH VERIO REFLECT) w/Device KIT Inject 1 Stick into the skin 4 times daily (before meals and nightly) 1 kit 0    Lancets (ONETOUCH DELICA PLUS [id number]) MISC Inject 1 strip into the skin 4 times daily (before meals and nightly) 400 each 3    ONETOUCH VERIO strip Inject 1 each into the skin 4 times daily (before meals and nightly) 400 each 3    aspirin 81 MG EC tablet Take 81 mg by mouth daily      carbonyl iron (FEOSOL) 45 MG TABS Take 1 tablet by mouth      Cholecalciferol 50 MCG (2000 UT) TABS Take 2,000 Units by mouth      cyanocobalamin 100 MCG tablet Take 100 mcg by mouth daily      DULoxetine (CYMBALTA) 30 MG extended release capsule Take 30 mg by mouth daily      Glucagon (GVOKE HYPOPEN 2-PACK) 1 MG/0.2ML SOAJ Inject 1 mg into the skin as needed      ondansetron (ZOFRAN-ODT) 4 MG disintegrating tablet Take 4 mg by mouth every 8 hours as needed      pantoprazole (PROTONIX) 40 MG tablet Take 40 mg by mouth daily      estradiol (ESTRACE) 1 MG tablet Take 1 mg by mouth       No current facility-administered medications for this visit.      Health Maintenance   Topic Date Due    Low dose CT lung screening  Never done    COVID-19 Vaccine (3 - Booster for Дмитрий series) 01/07/2022    Breast cancer screen  11/23/2022    Diabetic microalbuminuria test  02/25/2023    Diabetic retinal exam  04/10/2023    Diabetic foot exam  09/19/2023    A1C test (Diabetic or Prediabetic)  09/19/2023    Lipids  09/19/2023    Depression Screen  09/19/2023    Colorectal Cancer Screen  06/02/2025    DTaP/Tdap/Td vaccine (2 - Td or Tdap) 12/13/2031    Flu vaccine  Completed    Shingles vaccine  Completed    Pneumococcal 0-64 years Vaccine  Completed    Hepatitis C screen  Completed    HIV screen  Completed    Hepatitis A vaccine  Aged Out    Hib vaccine  Aged Out    Meningococcal (ACWY) vaccine  Aged Out     Family History   Problem Relation Age of Onset    Coronary Art Dis Neg Hx     Diabetes Maternal Grandfather     Diabetes Mother              Review of Systems  Review of Systems   Constitutional:  Negative for chills and fever.   HENT:  Positive for congestion and ear pain. Respiratory:  Negative for cough, shortness of breath and wheezing. Cardiovascular:  Negative for chest pain, palpitations and leg swelling. Gastrointestinal:  Negative for blood in stool, constipation, diarrhea, nausea and vomiting. Neurological:  Positive for headaches. /68 (Site: Left Upper Arm, Position: Sitting, Cuff Size: Medium Adult)   Pulse 89   Temp 97.4 °F (36.3 °C) (Temporal)   Ht 5' 6\" (1.676 m)   Wt 182 lb (82.6 kg)   SpO2 97%   BMI 29.38 kg/m²       Physical Exam    Physical Exam  Constitutional:       Appearance: Normal appearance. She is not ill-appearing. HENT:      Right Ear: Tympanic membrane normal.      Left Ear: Tympanic membrane normal.      Nose:      Right Sinus: Maxillary sinus tenderness present. Left Sinus: No maxillary sinus tenderness. Neck:      Vascular: No carotid bruit. Cardiovascular:      Rate and Rhythm: Normal rate and regular rhythm. Pulmonary:      Effort: Pulmonary effort is normal.      Breath sounds: Normal breath sounds. No wheezing. Neurological:      Mental Status: She is alert and oriented to person, place, and time. Deep Tendon Reflexes:      Reflex Scores:       Patellar reflexes are 2+ on the right side and 2+ on the left side. Psychiatric:         Mood and Affect: Mood normal.         Behavior: Behavior normal.         Assessment & Plan    Current Outpatient Medications   Medication Sig Dispense Refill    buPROPion (WELLBUTRIN XL) 300 MG extended release tablet Take 1 tablet by mouth every morning 90 tablet 3    pseudoephedrine (DECONGESTANT) 30 MG tablet Take 1 tablet by mouth 2 times daily as needed for Congestion  1    ticagrelor (BRILINTA) 90 MG TABS tablet Take 1 tablet by mouth in the morning and 1 tablet in the evening.  180 tablet 3    rosuvastatin (CRESTOR) 20 MG tablet Take 1 tablet by mouth daily 90 tablet 3    nitroGLYCERIN (NITROSTAT) 0.4 MG SL tablet Place 1 tablet under the tongue every 5 minutes as needed for Chest pain 25 tablet 3    metoprolol succinate (TOPROL XL) 50 MG extended release tablet Take 1 tablet by mouth daily 90 tablet 3    losartan (COZAAR) 25 MG tablet Take 1 tablet by mouth daily Indications: Taking 1/2 a tablet (Patient taking differently: Take 12.5 mg by mouth daily Indications: Taking 1/2 a tablet) 90 tablet 3    montelukast (SINGULAIR) 10 MG tablet TAKE 1 TABLET BY MOUTH NIGHTLY 90 tablet 3    Continuous Blood Gluc Transmit (DEXCOM G6 TRANSMITTER) MISC by Does not apply route      Continuous Blood Gluc Sensor (DEXCOM G6 SENSOR) MISC by Does not apply route      LEVEMIR FLEXTOUCH 100 UNIT/ML injection pen Inject 18 Units into the skin nightly Titrate 2 units every 3 days to fasting BG goal of  mg/dL. Max Daily dose of 30 units 27 mL 11    JARDIANCE 10 MG tablet Take 1 tablet by mouth daily 90 tablet 11    metFORMIN (GLUCOPHAGE-XR) 500 MG extended release tablet Take 2 tablets by mouth 2 times daily 360 tablet 11    NOVOLOG FLEXPEN 100 UNIT/ML injection pen Inject 3 Units into the skin 3 times daily (before meals) Correction scale for mealtime: 1 units for 50 points greater than 150 mg/dL. Max daily dose: 40 units 39 mL 11    OZEMPIC, 2 MG/DOSE, 8 MG/3ML SOPN Inject 2 mg into the skin every 7 days 3 mL 5    Continuous Blood Gluc Sensor (DEXCOM G6 SENSOR) MISC Change every 10 days as directed. 9 each 3    Continuous Blood Gluc Transmit (DEXCOM G6 TRANSMITTER) MISC Change every 90 days as directed.  1 each 3    Blood Glucose Monitoring Suppl (ONETOUCH VERIO REFLECT) w/Device KIT Inject 1 Stick into the skin 4 times daily (before meals and nightly) 1 kit 0    Lancets (ONETOUCH DELICA PLUS [id number]) MISC Inject 1 strip into the skin 4 times daily (before meals and nightly) 400 each 3    ONETOUCH VERIO strip Inject 1 each into the skin 4 times daily (before meals and nightly) 400 each 3    aspirin 81 MG EC tablet Take 81 mg by mouth daily carbonyl iron (FEOSOL) 45 MG TABS Take 1 tablet by mouth      Cholecalciferol 50 MCG (2000 UT) TABS Take 2,000 Units by mouth      cyanocobalamin 100 MCG tablet Take 100 mcg by mouth daily      DULoxetine (CYMBALTA) 30 MG extended release capsule Take 30 mg by mouth daily      Glucagon (GVOKE HYPOPEN 2-PACK) 1 MG/0.2ML SOAJ Inject 1 mg into the skin as needed      ondansetron (ZOFRAN-ODT) 4 MG disintegrating tablet Take 4 mg by mouth every 8 hours as needed      pantoprazole (PROTONIX) 40 MG tablet Take 40 mg by mouth daily      estradiol (ESTRACE) 1 MG tablet Take 1 mg by mouth       No current facility-administered medications for this visit. No orders of the defined types were placed in this encounter. Orders Placed This Encounter   Medications    buPROPion (WELLBUTRIN XL) 300 MG extended release tablet     Sig: Take 1 tablet by mouth every morning     Dispense:  90 tablet     Refill:  3    pseudoephedrine (DECONGESTANT) 30 MG tablet     Sig: Take 1 tablet by mouth 2 times daily as needed for Congestion     Refill:  1       Medications Discontinued During This Encounter   Medication Reason    buPROPion (WELLBUTRIN XL) 150 MG extended release tablet REORDER        Diagnosis Orders   1. Viral URI        2. Anxiety        3. Type 2 diabetes mellitus with other circulatory complication, with long-term current use of insulin ([facility] [address]        4. Hypercholesterolemia           Increase dose of Wellbutrin as above. Primarily reassurance provided today related to her sinus complaints. Can add 30 mg of pseudoephedrine up to twice a day as needed, but she needs to monitor her blood pressure while on this medication. Hopefully adjusting medications will help some with her appetite. It may be helping some with her anxiety level. Has an appointment with her endocrinologist in the near future. Follow-up as previously scheduled with them and as above or earlier if needed.     Return in about 4 months (around 3/21/2023).           Adrián Stiles MD

## 2022-12-06 RX ORDER — PANTOPRAZOLE SODIUM 40 MG/1
TABLET, DELAYED RELEASE ORAL
Qty: 90 TABLET | Refills: 3 | Status: SHIPPED | OUTPATIENT
Start: 2022-12-06

## 2022-12-06 RX ORDER — BUPROPION HYDROCHLORIDE 150 MG/1
TABLET ORAL
Qty: 90 TABLET | Refills: 1 | Status: SHIPPED | OUTPATIENT
Start: 2022-12-06

## 2022-12-06 RX ORDER — DULOXETIN HYDROCHLORIDE 30 MG/1
CAPSULE, DELAYED RELEASE ORAL
Qty: 90 CAPSULE | Refills: 3 | Status: SHIPPED | OUTPATIENT
Start: 2022-12-06

## 2022-12-14 ENCOUNTER — OFFICE VISIT (OUTPATIENT)
Dept: ENDOCRINOLOGY | Age: 62
End: 2022-12-14

## 2022-12-14 VITALS
HEIGHT: 66 IN | OXYGEN SATURATION: 97 % | WEIGHT: 185 LBS | BODY MASS INDEX: 29.73 KG/M2 | SYSTOLIC BLOOD PRESSURE: 118 MMHG | HEART RATE: 84 BPM | DIASTOLIC BLOOD PRESSURE: 68 MMHG

## 2022-12-14 DIAGNOSIS — Z79.4 TYPE 2 DIABETES MELLITUS WITH HYPERGLYCEMIA, WITH LONG-TERM CURRENT USE OF INSULIN (HCC): Primary | ICD-10-CM

## 2022-12-14 DIAGNOSIS — E11.65 TYPE 2 DIABETES MELLITUS WITH HYPERGLYCEMIA, WITH LONG-TERM CURRENT USE OF INSULIN (HCC): Primary | ICD-10-CM

## 2022-12-14 LAB — HBA1C MFR BLD: 6.8 %

## 2022-12-14 RX ORDER — EMPAGLIFLOZIN 10 MG/1
10 TABLET, FILM COATED ORAL DAILY
Qty: 90 TABLET | Refills: 11 | Status: SHIPPED | OUTPATIENT
Start: 2022-12-14

## 2022-12-14 RX ORDER — BLOOD-GLUCOSE TRANSMITTER
EACH MISCELLANEOUS
Qty: 1 EACH | Refills: 11 | Status: SHIPPED | OUTPATIENT
Start: 2022-12-14

## 2022-12-14 RX ORDER — PEN NEEDLE, DIABETIC 32GX 5/32"
NEEDLE, DISPOSABLE MISCELLANEOUS
COMMUNITY
Start: 2022-11-01

## 2022-12-14 RX ORDER — SEMAGLUTIDE 2.68 MG/ML
2 INJECTION, SOLUTION SUBCUTANEOUS
Qty: 3 ML | Refills: 11 | Status: SHIPPED | OUTPATIENT
Start: 2022-12-14

## 2022-12-14 RX ORDER — ESTRADIOL 0.1 MG/G
CREAM VAGINAL
COMMUNITY
Start: 2022-10-14 | End: 2022-12-14

## 2022-12-14 RX ORDER — INSULIN ASPART 100 [IU]/ML
INJECTION, SOLUTION INTRAVENOUS; SUBCUTANEOUS
Qty: 39 ML | Refills: 11 | Status: SHIPPED | OUTPATIENT
Start: 2022-12-14

## 2022-12-14 RX ORDER — INSULIN DETEMIR 100 [IU]/ML
10 INJECTION, SOLUTION SUBCUTANEOUS 2 TIMES DAILY
Qty: 27 ML | Refills: 11 | Status: SHIPPED | OUTPATIENT
Start: 2022-12-14

## 2022-12-14 RX ORDER — BLOOD-GLUCOSE SENSOR
EACH MISCELLANEOUS
Qty: 9 EACH | Refills: 11 | Status: SHIPPED | OUTPATIENT
Start: 2022-12-14

## 2022-12-14 RX ORDER — METFORMIN HYDROCHLORIDE 500 MG/1
500 TABLET, EXTENDED RELEASE ORAL 2 TIMES DAILY
Qty: 360 TABLET | Refills: 11 | Status: SHIPPED | OUTPATIENT
Start: 2022-12-14

## 2022-12-14 ASSESSMENT — ENCOUNTER SYMPTOMS
VOICE CHANGE: 0
CONSTIPATION: 0
WHEEZING: 0
TROUBLE SWALLOWING: 0
COUGH: 0
VOMITING: 0
NAUSEA: 1
BACK PAIN: 0
SHORTNESS OF BREATH: 0
DIARRHEA: 1
ABDOMINAL PAIN: 0

## 2022-12-14 NOTE — PROGRESS NOTES
[facility] ENDOCRINOLOGY   AND   THYROID [facility]ODULE CL[facility]IC    ALEX Rossi  [address], [address] [facility], [address] [place] [address]  Phone [phone]  Facsimile [phone]      Reason for visit: Codi ARROYO (1960) is here for follow up of Type 2 Diabetes Mellitus. ASSESSMENT AND PLAN:    Interpretation of 72 hour glucose monitor: At least 72 hours of data were reviewed. The patient utilizes a Dexcom G6 continuous glucose monitoring system. The average glucose during the reviewed timeframe was 164 with a standard deviation of 40 (time in range 72%, hyperglycemia 28%, hypoglycemia 0%). There is a pattern of postprandial hyperglycemia. 1. Type 2 diabetes mellitus with hyperglycemia, with long-term current use of insulin (HCC)  A1c is 6.8%. Glycemic control is improving. Patient is at a lower risk for cardiovascular complications due to diabetes with A1c below 7%. Patient would like to DC metformin. She reports brain fog with metformin and that she read online that it lowers life expectancy. This information is contrary to medical research. I explained that if we stop her metformin -she may have a slight increase in weight and insulin needs will likely increase. --- Continue Ozempic 2 mg weekly. --- Increase Novolog to 4 units B 4 units L 5 units dinner with correction 1 for 50 above 150.  --- Increase Levemir 10 units BID with instructions to titrate. --- Continue Jardiance 10 mg daily. --- Decrease metformin  mg 2 times daily. --- F/u in 2 weeks. - BD PEN NEEDLE KERON 2ND GEN 32G X 4 MM MISC; FOR USE WITH INSULIN 4 TIMES DAILY. E11.65.  - AMB POC HEMOGLOBIN A1C  - NOVOLOG FLEXPEN 100 UNIT/ML injection pen; 4 units at Breakfast, 4 units at Lunch, 5 units at dinner. Correction scale for mealtime: 1 units for 50 points greater than 150 mg/dL. Max daily dose: 40 units  Dispense: 39 mL; Refill: 11  - metFORMIN (GLUCOPHAGE-XR) 500 MG extended release tablet;  Take 1 tablet by mouth 2 times daily  Dispense: 360 tablet; Refill: 11  - JARDIANCE 10 MG tablet; Take 1 tablet by mouth daily  Dispense: 90 tablet; Refill: 11  - OZEMPIC, 2 MG/DOSE, 8 MG/3ML SOPN; Inject 2 mg into the skin every 7 days  Dispense: 3 mL; Refill: 11  - LEVEMIR FLEXTOUCH 100 UNIT/ML injection pen; Inject 10 Units into the skin 2 times daily Titrate 2 units every 3 days to fasting BG goal of  mg/dL. Max Daily dose of 30 units  Dispense: 27 mL; Refill: 11  - GLUCOSE MONITOR, 72 HOUR, PHYS INTERP    2. Hypercholesterolemia  Last LDL 28 at goal on rosuvastatin 20 mg. Patient will follow with primary care physician. 3. CAD in native artery  Jardiance and Ozempic are cardioprotective and renal protective. 4. Albuminuria  Patient is taking SGLT2 inhibitor and ARB daily. Recommend Algis Hippo future discussion    5. Nicotine dependence history  -Patient reports that she has stopped smoking and is a good candidate for Afrezza. Follow-up and Dispositions    Return for 1 week follow up phone call. Cardiologist: [facility] Dr. Codey Morris      History of Present Illness:         Current Diabetes Medications: Jardiance 10 mg daily in the morning, Ozempic 2 mg weekly, Metformin XR 2000 mg, levemir 18 units night. Novolog  3 units sometimes 5 units rarely at night, Jardiance 10 mg  daily. History given by patient. July 1st 2021 - stopped smoking - 1 pack per day - 45 years. Date of DM diagnosis: age 44, year 2003       Medication Failures: Trulicity - sick on higher dose, Rybelsus - did nothing for BG.        Current symptoms: See Review of Systems       Neuropathy: none       Nephropathy: Stage 2 Kidney Disease   10/13/2021      Cr 0.52, , microalbumin/Cr ratio none                    02/25/2022                 microalbumin/Cr ratio 35   04/18/2022 Cr 0.40, GFR >60,  07/26/2022  Cr 0.80, GFR >60  09/06/2002  Cr 0.6, , microalbumin/Cr ratio 38.69       12/12/2022 Cr 0.7, GFR 91     Retinopathy: Last eye exam was 12/30/2021 and demonstrated no diabetic retinopathy. Eye care specialist is Dr. Tre Mckeon, [facility]. S/p cataract removal bilaterally. Diet:    Breakfast; banana and peanut butter,   Lunch; chicken soup and salad   Dinner; meat, vegetable, and starch. Snacks - fruit or fruit parfee   Drink: water, coffee, tea, diet coke. No milk, no bread, no eggs. Exercise:  [facility] in [place], [place] [facility] - 15-30 minutes on treadmill, 20-30 minutes (4 days a week). Diabetes education: The patient has not received formal diabetes education. Hemoglobin A1c:   08/09/2010      7.3%   01/17/2020      7.9%   07/06/2020      7.7%   10/06/2020      7.8%   04/01/2021      8.2%   09/28/2021      9.7%   11/16/2021      8.0%   03/02/2022     7.7%   04/17/2022      8.0%  08/02/2022       7.1%  09/19/2022       7.0%  12/14/2022      6.8%      Home blood glucose monitoring frequency:   By review of CGM download over past 28 days  Average blood glucose 164  Time in range 72%  High 25%, Very High 3%  Low 0%, Very Low 0%     Average    Fasting 153    AC lunch 155    AC supper 162    Bedtime 192      Blood glucose levels are uncontrolled, most significant elevations are after meals and night time. Pregnancy: s/p menopause       Lipids:    11/16/2021  TC- 98, LDL- 28, VLDL- 20,  HDL- 50, TG- 100    09/19/2022   TC- 126, LDL- 41.4, VLDL- 22.6,  HDL- 62, TG- 113     Thyroid:   04/17/2022    TSH    3.320       BP:                 BP Readings from Last 3 Encounters:        01/27/22  112/60     12/13/21  (!) 119/49     12/01/21  132/61         Weight Trends:                  Wt Readings from Last 3 Encounters:        01/27/22  170 lb (77.1 kg)     12/13/21  169 lb 9.6 oz (76.9 kg)     12/01/21  175 lb (79.4 kg)         Medical/Surgical/Social/Family History: Reviewed in Chart       Medications: Reviewed in chart       Allergies   Patient has no known allergies    Tests or Results Reviewed: (see lab dates below in note) HgbA1C, Cr, GFR, Microalbumin/Cr ratio, Lipid Profile, TSH. /68   Pulse 84   Ht 5' 6\" (1.676 m)   Wt 185 lb (83.9 kg)   SpO2 97%   BMI 29.86 kg/m²     Weight Trends: Wt Readings from Last 3 Encounters:   12/14/22 185 lb (83.9 kg)   11/21/22 182 lb (82.6 kg)   11/16/22 183 lb 3.2 oz (83.1 kg)       Allergies and Medications: Reviewed in Chart    Review of Systems   Constitutional:  Negative for appetite change, diaphoresis, fatigue and unexpected weight change. HENT:  Negative for trouble swallowing and voice change. Eyes:  Negative for visual disturbance. Respiratory:  Negative for cough, shortness of breath and wheezing. Cardiovascular:  Negative for chest pain, palpitations and leg swelling. Gastrointestinal:  Positive for diarrhea (occasional diarrhea) and nausea (intermittent). Negative for abdominal pain, constipation and vomiting. Gas   Endocrine: Negative for cold intolerance, heat intolerance, polydipsia, polyphagia and polyuria. Genitourinary:  Negative for difficulty urinating and frequency. Musculoskeletal:  Positive for arthralgias (right hip pain from recent fall at home. ). Negative for back pain and myalgias. Skin:  Negative for pallor. Neurological:  Negative for dizziness, tremors, weakness, numbness and headaches. Hematological:  Negative for adenopathy. Psychiatric/Behavioral:  Negative for dysphoric mood and sleep disturbance. The patient is not nervous/anxious. Physical Exam  Constitutional:       General: She is not in acute distress. Appearance: Normal appearance. She is normal weight. She is not ill-appearing. HENT:      Head: Normocephalic. Cardiovascular:      Rate and Rhythm: Normal rate and regular rhythm. Pulses: Normal pulses. Pulmonary:      Effort: No respiratory distress. Breath sounds: Normal breath sounds. No wheezing or rhonchi.    Chest: Chest wall: No tenderness. Abdominal:      General: There is no distension. Palpations: Abdomen is soft. Tenderness: There is no abdominal tenderness. There is no guarding. Musculoskeletal:         General: No swelling, tenderness or signs of injury. Cervical back: Neck supple. No tenderness. Right lower leg: No edema. Left lower leg: No edema. Feet:      Right foot:      Skin integrity: Skin integrity normal. No ulcer. Left foot:      Skin integrity: Skin integrity normal. No ulcer. Lymphadenopathy:      Cervical: No cervical adenopathy. Skin:     General: Skin is warm and dry. Findings: No erythema or rash. Neurological:      Mental Status: She is alert. Motor: No weakness. Psychiatric:         Mood and Affect: Mood normal.         Behavior: Behavior normal.       Orders Placed This Encounter   Procedures    GLUCOSE MONITOR, 72 HOUR, PHYS INTERP    AMB POC HEMOGLOBIN A1C         Current Outpatient Medications   Medication Sig Dispense Refill    BD PEN NEEDLE KERON 2ND GEN 32G X 4 MM MISC FOR USE WITH INSULIN 4 TIMES DAILY. E11.65. NOVOLOG FLEXPEN 100 UNIT/ML injection pen 4 units at Breakfast, 4 units at Lunch, 5 units at dinner. Correction scale for mealtime: 1 units for 50 points greater than 150 mg/dL. Max daily dose: 40 units 39 mL 11    metFORMIN (GLUCOPHAGE-XR) 500 MG extended release tablet Take 1 tablet by mouth 2 times daily 360 tablet 11    JARDIANCE 10 MG tablet Take 1 tablet by mouth daily 90 tablet 11    OZEMPIC, 2 MG/DOSE, 8 MG/3ML SOPN Inject 2 mg into the skin every 7 days 3 mL 11    LEVEMIR FLEXTOUCH 100 UNIT/ML injection pen Inject 10 Units into the skin 2 times daily Titrate 2 units every 3 days to fasting BG goal of  mg/dL. Max Daily dose of 30 units 27 mL 11    Continuous Blood Gluc Sensor (DEXCOM G6 SENSOR) MISC Change every 10 days as directed.  9 each 11    Continuous Blood Gluc Transmit (DEXCOM G6 TRANSMITTER) MISC Change every 90 days as directed. 1 each 11    DULoxetine (CYMBALTA) 30 MG extended release capsule TAKE 1 CAPSULE BY MOUTH EVERY DAY 90 capsule 3    buPROPion (WELLBUTRIN XL) 150 MG extended release tablet TAKE 1 TABLET BY MOUTH EVERY DAY IN THE MORNING 90 tablet 1    pantoprazole (PROTONIX) 40 MG tablet TAKE 1 TABLET BY MOUTH EVERY DAY 90 tablet 3    buPROPion (WELLBUTRIN XL) 300 MG extended release tablet Take 1 tablet by mouth every morning 90 tablet 3    pseudoephedrine (DECONGESTANT) 30 MG tablet Take 1 tablet by mouth 2 times daily as needed for Congestion  1    ticagrelor (BRILINTA) 90 MG TABS tablet Take 1 tablet by mouth in the morning and 1 tablet in the evening.  180 tablet 3    rosuvastatin (CRESTOR) 20 MG tablet Take 1 tablet by mouth daily 90 tablet 3    nitroGLYCERIN (NITROSTAT) 0.4 MG SL tablet Place 1 tablet under the tongue every 5 minutes as needed for Chest pain 25 tablet 3    metoprolol succinate (TOPROL XL) 50 MG extended release tablet Take 1 tablet by mouth daily 90 tablet 3    losartan (COZAAR) 25 MG tablet Take 1 tablet by mouth daily Indications: Taking 1/2 a tablet (Patient taking differently: Take 12.5 mg by mouth daily Indications: Taking 1/2 a tablet) 90 tablet 3    montelukast (SINGULAIR) 10 MG tablet TAKE 1 TABLET BY MOUTH NIGHTLY 90 tablet 3    Blood Glucose Monitoring Suppl (ONETOUCH VERIO REFLECT) w/Device KIT Inject 1 Stick into the skin 4 times daily (before meals and nightly) 1 kit 0    Lancets (ONETOUCH DELICA PLUS [id number]) MISC Inject 1 strip into the skin 4 times daily (before meals and nightly) 400 each 3    ONETOUCH VERIO strip Inject 1 each into the skin 4 times daily (before meals and nightly) 400 each 3    aspirin 81 MG EC tablet Take 81 mg by mouth daily      carbonyl iron (FEOSOL) 45 MG TABS Take 1 tablet by mouth      Cholecalciferol 50 MCG (2000 UT) TABS Take 2,000 Units by mouth      cyanocobalamin 100 MCG tablet Take 100 mcg by mouth daily      estradiol (ESTRACE) 1 MG tablet Take 1 mg by mouth      Glucagon (GVOKE HYPOPEN 2-PACK) 1 MG/0.2ML SOAJ Inject 1 mg into the skin as needed      ondansetron (ZOFRAN-ODT) 4 MG disintegrating tablet Take 4 mg by mouth every 8 hours as needed       No current facility-administered medications for this visit. STEFFI Zacarias NP    On this date 12/14/2022, I have spent 40 minutes reviewing previous notes, test results and face to face with the patient discussing the diagnosis and importance of compliance with the treatment plan as well as documenting on the day of the visit. Portions of this note were generated with the assistance of voice recognition software. As such, some errors in transcription may be present.

## 2022-12-19 ENCOUNTER — PATIENT MESSAGE (OUTPATIENT)
Dept: INTERNAL MEDICINE CLINIC | Facility: CLINIC | Age: 62
End: 2022-12-19

## 2022-12-19 DIAGNOSIS — E11.59 TYPE 2 DIABETES MELLITUS WITH OTHER CIRCULATORY COMPLICATION, WITH LONG-TERM CURRENT USE OF INSULIN (HCC): Primary | ICD-10-CM

## 2022-12-19 DIAGNOSIS — Z79.4 TYPE 2 DIABETES MELLITUS WITH OTHER CIRCULATORY COMPLICATION, WITH LONG-TERM CURRENT USE OF INSULIN (HCC): Primary | ICD-10-CM

## 2022-12-20 NOTE — TELEPHONE ENCOUNTER
From: Tyesha Fuchs  To: Dr. Crystal Pena: 12/19/2022 11:07 AM EST  Subject: Diabeties    Dr Sayda Herbert  My endocrinologist Dameon Rodriguez is leaving the practice and the [facility] endocrinologist only has one office and shes in it. We spoke about you referring me to a new endocrinologist can you please recommend me someone. They do not have a replacement for Rosendo Beavres and will not let me see one thats on staff.     Thank you  Harshal Tejada

## 2022-12-21 ENCOUNTER — SCHEDULED TELEPHONE ENCOUNTER (OUTPATIENT)
Dept: ENDOCRINOLOGY | Age: 62
End: 2022-12-21

## 2022-12-21 DIAGNOSIS — Z79.4 TYPE 2 DIABETES MELLITUS WITH HYPERGLYCEMIA, WITH LONG-TERM CURRENT USE OF INSULIN (HCC): Primary | ICD-10-CM

## 2022-12-21 DIAGNOSIS — E11.65 TYPE 2 DIABETES MELLITUS WITH HYPERGLYCEMIA, WITH LONG-TERM CURRENT USE OF INSULIN (HCC): Primary | ICD-10-CM

## 2022-12-21 RX ORDER — PEN NEEDLE, DIABETIC 32GX 5/32"
NEEDLE, DISPOSABLE MISCELLANEOUS
Qty: 200 EACH | Refills: 11 | Status: SHIPPED | OUTPATIENT
Start: 2022-12-21 | End: 2022-12-21 | Stop reason: SDUPTHER

## 2022-12-21 RX ORDER — PEN NEEDLE, DIABETIC 32GX 5/32"
NEEDLE, DISPOSABLE MISCELLANEOUS
Qty: 200 EACH | Refills: 11 | Status: CANCELLED | OUTPATIENT
Start: 2022-12-21

## 2022-12-21 RX ORDER — PEN NEEDLE, DIABETIC 32GX 5/32"
NEEDLE, DISPOSABLE MISCELLANEOUS
Qty: 200 EACH | Refills: 11 | Status: SHIPPED | OUTPATIENT
Start: 2022-12-21

## 2022-12-21 NOTE — TELEPHONE ENCOUNTER
1. Type 2 diabetes mellitus with hyperglycemia, with long-term current use of insulin (HCC)    - BD PEN NEEDLE KERON 2ND GEN 32G X 4 MM MISC; FOR USE WITH INSULIN 4 TIMES DAILY. E11.65. Dispense: 200 each;  Refill: 11

## 2022-12-21 NOTE — TELEPHONE ENCOUNTER
Patient called requesting a new prescription on her BD pen needles since she is running low faster on her last prescription since she test more times now.

## 2023-01-16 DIAGNOSIS — E11.65 TYPE 2 DIABETES MELLITUS WITH HYPERGLYCEMIA, WITH LONG-TERM CURRENT USE OF INSULIN (HCC): Primary | ICD-10-CM

## 2023-01-16 DIAGNOSIS — Z79.4 TYPE 2 DIABETES MELLITUS WITH HYPERGLYCEMIA, WITH LONG-TERM CURRENT USE OF INSULIN (HCC): Primary | ICD-10-CM

## 2023-01-16 RX ORDER — BLOOD-GLUCOSE,RECEIVER,CONT
EACH MISCELLANEOUS
Qty: 1 EACH | Refills: 0 | Status: CANCELLED | OUTPATIENT
Start: 2023-01-16

## 2023-01-16 RX ORDER — BLOOD-GLUCOSE,RECEIVER,CONT
EACH MISCELLANEOUS
Qty: 1 EACH | Refills: 11 | Status: SHIPPED | OUTPATIENT
Start: 2023-01-16 | End: 2023-01-16 | Stop reason: SDUPTHER

## 2023-01-16 RX ORDER — BLOOD-GLUCOSE,RECEIVER,CONT
EACH MISCELLANEOUS
Qty: 1 EACH | Refills: 0 | Status: SHIPPED | OUTPATIENT
Start: 2023-01-16

## 2023-01-16 NOTE — TELEPHONE ENCOUNTER
1. Type 2 diabetes mellitus with hyperglycemia, with long-term current use of insulin (HCC)    - Continuous Blood Gluc  (DEXCOM G6 ) SHERINE; Use as directed  Dispense: 1 each;  Refill: 11

## 2023-01-16 NOTE — TELEPHONE ENCOUNTER
Patient sent a my chart message stating she had dropped her  and that it is no longer readable to see her sugars.

## 2023-01-30 ENCOUNTER — PATIENT MESSAGE (OUTPATIENT)
Dept: CARDIOLOGY CLINIC | Age: 63
End: 2023-01-30

## 2023-01-30 NOTE — TELEPHONE ENCOUNTER
From: George Preston  To: Dr. Salcedo Mean: 1/30/2023 9:27 AM EST  Subject: Endocrinology     Dr Manuel Lea    The NP I have been seeing at [facility] has left the practice. I asked to be seen by one of the MDs and was told no. They may have another NP starting in May. I really feel I would be better served by seeing a MD. I even called [facility] and they wont accept any referral from outside their group. Is there anything you can do to help?

## 2023-01-30 NOTE — TELEPHONE ENCOUNTER
Per Dr. Yung Okeefe, \"I would have her go in for the appointment for now and when she gets there if she does not like who she sees, ask for physician on checkout. She has the right to ask for a change in provider or see a physician; it may take longer based on their wait times however. \"    Pt informed of Dr. Yung Okeefe' response. She thanked me and voiced understanding.

## 2023-02-10 ENCOUNTER — TELEMEDICINE (OUTPATIENT)
Dept: INTERNAL MEDICINE CLINIC | Facility: CLINIC | Age: 63
End: 2023-02-10
Payer: COMMERCIAL

## 2023-02-10 DIAGNOSIS — J01.00 ACUTE MAXILLARY SINUSITIS, RECURRENCE NOT SPECIFIED: Primary | ICD-10-CM

## 2023-02-10 PROCEDURE — 99422 OL DIG E/M SVC 11-20 MIN: CPT | Performed by: NURSE PRACTITIONER

## 2023-02-10 RX ORDER — ECHINACEA PURPUREA EXTRACT 125 MG
1 TABLET ORAL PRN
Qty: 1 EACH | Refills: 3 | Status: SHIPPED | OUTPATIENT
Start: 2023-02-10

## 2023-02-10 RX ORDER — METHYLPREDNISOLONE 4 MG/1
TABLET ORAL
Qty: 1 KIT | Refills: 0 | Status: SHIPPED | OUTPATIENT
Start: 2023-02-10 | End: 2023-02-16

## 2023-02-10 RX ORDER — BENZONATATE 200 MG/1
200 CAPSULE ORAL 3 TIMES DAILY PRN
Qty: 30 CAPSULE | Refills: 0 | Status: SHIPPED | OUTPATIENT
Start: 2023-02-10 | End: 2023-02-17

## 2023-02-10 SDOH — ECONOMIC STABILITY: FOOD INSECURITY: WITHIN THE PAST 12 MONTHS, THE FOOD YOU BOUGHT JUST DIDN'T LAST AND YOU DIDN'T HAVE MONEY TO GET MORE.: NEVER TRUE

## 2023-02-10 SDOH — ECONOMIC STABILITY: HOUSING INSECURITY
IN THE LAST 12 MONTHS, WAS THERE A TIME WHEN YOU DID NOT HAVE A STEADY PLACE TO SLEEP OR SLEPT IN A SHELTER (INCLUDING NOW)?: NO

## 2023-02-10 SDOH — ECONOMIC STABILITY: FOOD INSECURITY: WITHIN THE PAST 12 MONTHS, YOU WORRIED THAT YOUR FOOD WOULD RUN OUT BEFORE YOU GOT MONEY TO BUY MORE.: NEVER TRUE

## 2023-02-10 SDOH — ECONOMIC STABILITY: INCOME INSECURITY: HOW HARD IS IT FOR YOU TO PAY FOR THE VERY BASICS LIKE FOOD, HOUSING, MEDICAL CARE, AND HEATING?: NOT HARD AT ALL

## 2023-02-10 ASSESSMENT — ENCOUNTER SYMPTOMS
WHEEZING: 0
SINUS PAIN: 1
COUGH: 1
SINUS PRESSURE: 1
RHINORRHEA: 1
SHORTNESS OF BREATH: 0
CHEST TIGHTNESS: 0
ABDOMINAL PAIN: 0

## 2023-02-10 ASSESSMENT — PATIENT HEALTH QUESTIONNAIRE - PHQ9
2. FEELING DOWN, DEPRESSED OR HOPELESS: 0
SUM OF ALL RESPONSES TO PHQ QUESTIONS 1-9: 0
SUM OF ALL RESPONSES TO PHQ QUESTIONS 1-9: 0
1. LITTLE INTEREST OR PLEASURE IN DOING THINGS: 0
SUM OF ALL RESPONSES TO PHQ9 QUESTIONS 1 & 2: 0
SUM OF ALL RESPONSES TO PHQ QUESTIONS 1-9: 0
SUM OF ALL RESPONSES TO PHQ QUESTIONS 1-9: 0

## 2023-02-10 NOTE — PROGRESS NOTES
2/10/2023 3:25 PM  Location:[facility] [address] [facility]  [place]  Patient #:  [id number]  YOB: 1960        History of Present Illness     Chief Complaint   Patient presents with    Sinus Problem     Sinus pressure in cheeks and nose causing headaches. Pt also has nose bleeds. Started two days ago. Covid test negative. Ms. Kyra Pruitt is a 65 y.o. female  who presents for vv on video and has sinus pressure in cheeks for 2 days and has had a nose bleed. She is using flonase. She is feeling pressure in her teeth. No fever/chills, has a night time cough that is keeping her up. No sob or chest congestion or wheezing. She has a headache. She has hx of CAD on brillinta, and controlled iddm on ssi, and levemir. She has had a steroid inj in her shoulder recently and knows how to adjust her insulin to cover for elevated bgl, states she is usually <120.    She had a negative home covid test.       No Known Allergies  Past Medical History:   Diagnosis Date    GERD (gastroesophageal reflux disease)     Hypercholesterolemia     Type 2 diabetes mellitus (HCC)     urinary tract infection      Social History     Socioeconomic History    Marital status:      Spouse name: None    Number of children: None    Years of education: None    Highest education level: None   Tobacco Use    Smoking status: Former     Packs/day: 1.00     Types: Cigarettes     Quit date: 2021     Years since quittin.6    Smokeless tobacco: Never   Substance and Sexual Activity    Alcohol use: Not Currently    Drug use: Not Currently     Social Determinants of Health     Financial Resource Strain: Low Risk     Difficulty of Paying Living Expenses: Not hard at all   Food Insecurity: No Food Insecurity    Worried About Running Out of Food in the Last Year: Never true    Ran Out of Food in the Last Year: Never true   Transportation Needs: Unknown    Lack of Transportation (Non-Medical): No   Housing Stability: Unknown    Unstable Housing in the Last Year: No     Past Surgical History:   Procedure Laterality Date    APPENDECTOMY      CHOLECYSTECTOMY      HYSTERECTOMY (CERVIX STATUS UNKNOWN)      ovaries removed    PTCA      times 2     Current Outpatient Medications   Medication Sig Dispense Refill    methylPREDNISolone (MEDROL DOSEPACK) 4 MG tablet Take by mouth. 1 kit 0    benzonatate (TESSALON) 200 MG capsule Take 1 capsule by mouth 3 times daily as needed for Cough 30 capsule 0    sodium chloride (ALTAMIST SPRAY) 0.65 % nasal spray 1 spray by Nasal route as needed for Congestion 1 each 3    Continuous Blood Gluc  (DEXCOM G6 ) SHERINE USE DAILY FOR CONTINOUS BLOOD GLUCOSE MONITORING - ONE  PER YEAR 1 each 0    BD PEN NEEDLE KERON 2ND GEN 32G X 4 MM MISC FOR USE WITH INSULIN 4 TIMES DAILY. E11.65. 200 each 11    NOVOLOG FLEXPEN 100 UNIT/ML injection pen 4 units at Breakfast, 4 units at Lunch, 5 units at dinner. Correction scale for mealtime: 1 units for 50 points greater than 150 mg/dL. Max daily dose: 40 units 39 mL 11    metFORMIN (GLUCOPHAGE-XR) 500 MG extended release tablet Take 1 tablet by mouth 2 times daily 360 tablet 11    JARDIANCE 10 MG tablet Take 1 tablet by mouth daily 90 tablet 11    OZEMPIC, 2 MG/DOSE, 8 MG/3ML SOPN Inject 2 mg into the skin every 7 days 3 mL 11    LEVEMIR FLEXTOUCH 100 UNIT/ML injection pen Inject 10 Units into the skin 2 times daily Titrate 2 units every 3 days to fasting BG goal of  mg/dL. Max Daily dose of 30 units 27 mL 11    Continuous Blood Gluc Sensor (DEXCOM G6 SENSOR) MISC Change every 10 days as directed. 9 each 11    Continuous Blood Gluc Transmit (DEXCOM G6 TRANSMITTER) MISC Change every 90 days as directed.  1 each 11    DULoxetine (CYMBALTA) 30 MG extended release capsule TAKE 1 CAPSULE BY MOUTH EVERY DAY 90 capsule 3    pantoprazole (PROTONIX) 40 MG tablet TAKE 1 TABLET BY MOUTH EVERY DAY 90 tablet 3    buPROPion (WELLBUTRIN XL) 300 MG extended release tablet Take 1 tablet by mouth every morning 90 tablet 3    ticagrelor (BRILINTA) 90 MG TABS tablet Take 1 tablet by mouth in the morning and 1 tablet in the evening. 180 tablet 3    rosuvastatin (CRESTOR) 20 MG tablet Take 1 tablet by mouth daily 90 tablet 3    nitroGLYCERIN (NITROSTAT) 0.4 MG SL tablet Place 1 tablet under the tongue every 5 minutes as needed for Chest pain 25 tablet 3    metoprolol succinate (TOPROL XL) 50 MG extended release tablet Take 1 tablet by mouth daily 90 tablet 3    losartan (COZAAR) 25 MG tablet Take 1 tablet by mouth daily Indications: Taking 1/2 a tablet (Patient taking differently: Take 12.5 mg by mouth daily Indications: Taking 1/2 a tablet) 90 tablet 3    montelukast (SINGULAIR) 10 MG tablet TAKE 1 TABLET BY MOUTH NIGHTLY 90 tablet 3    Blood Glucose Monitoring Suppl (ONETOUCH VERIO REFLECT) w/Device KIT Inject 1 Stick into the skin 4 times daily (before meals and nightly) 1 kit 0    Lancets (ONETOUCH DELICA PLUS [id number]) MISC Inject 1 strip into the skin 4 times daily (before meals and nightly) 400 each 3    ONETOUCH VERIO strip Inject 1 each into the skin 4 times daily (before meals and nightly) 400 each 3    aspirin 81 MG EC tablet Take 81 mg by mouth daily      carbonyl iron (FEOSOL) 45 MG TABS Take 1 tablet by mouth      Cholecalciferol 50 MCG (2000 UT) TABS Take 2,000 Units by mouth      cyanocobalamin 100 MCG tablet Take 100 mcg by mouth daily      Glucagon (GVOKE HYPOPEN 2-PACK) 1 MG/0.2ML SOAJ Inject 1 mg into the skin as needed      ondansetron (ZOFRAN-ODT) 4 MG disintegrating tablet Take 4 mg by mouth every 8 hours as needed      pseudoephedrine (DECONGESTANT) 30 MG tablet Take 1 tablet by mouth 2 times daily as needed for Congestion (Patient not taking: Reported on 2/10/2023)  1    estradiol (ESTRACE) 1 MG tablet Take 1 mg by mouth       No current facility-administered medications for this visit.      Health Maintenance   Topic Date Due    Low dose CT lung screening  Never done    COVID-19 Vaccine (3 - Booster for Дмитрий series) 01/07/2022    Diabetic Alb to Cr ratio (uACR) test  02/25/2023    Diabetic retinal exam  04/10/2023    GFR test (Diabetes, CKD 3-4, OR last GFR 15-59)  07/26/2023    Diabetic foot exam  09/19/2023    Lipids  09/19/2023    Depression Screen  11/21/2023    Breast cancer screen  11/28/2023    A1C test (Diabetic or Prediabetic)  12/14/2023    Colorectal Cancer Screen  06/02/2025    DTaP/Tdap/Td vaccine (2 - Td or Tdap) 12/13/2031    Flu vaccine  Completed    Shingles vaccine  Completed    Pneumococcal 0-64 years Vaccine  Completed    Hepatitis C screen  Completed    HIV screen  Completed    Hepatitis A vaccine  Aged Out    Hib vaccine  Aged Out    Meningococcal (ACWY) vaccine  Aged Out     Family History   Problem Relation Age of Onset    Coronary Art Dis Neg Hx     Diabetes Maternal Grandfather     Diabetes Mother        Most recent labs    YOUR LAST HEMOGLOBIN A1CS:     Lab Results   Component Value Date/Time    HBA1C 8.0 11/16/2021 12:00 AM    UCU3RMDS 6.8 12/14/2022 09:56 AM       YOUR LAST LIPID PROFILE:   Lab Results   Component Value Date/Time    CHOL 126 09/19/2022 10:30 AM    HDL 62 09/19/2022 10:30 AM       Lab Results   Component Value Date/Time    GFRAA >60 07/26/2022 02:34 PM    BUN 16 07/26/2022 02:34 PM     07/26/2022 02:34 PM    K 4.9 07/26/2022 02:34 PM     07/26/2022 02:34 PM    CO2 23 07/26/2022 02:34 PM       Review of Systems  Review of Systems   Constitutional:  Positive for fatigue. HENT:  Positive for congestion, nosebleeds, rhinorrhea, sinus pressure and sinus pain. Respiratory:  Positive for cough. Negative for chest tightness, shortness of breath and wheezing. Cardiovascular:  Negative for chest pain. Gastrointestinal:  Negative for abdominal pain. Musculoskeletal:  Negative for arthralgias. All other systems reviewed and are negative.     There were no vitals taken for this visit.      Physical Exam    Physical Exam  Constitutional:       General: She is not in acute distress. Appearance: Normal appearance. She is not ill-appearing. Pulmonary:      Effort: Pulmonary effort is normal.   Neurological:      Mental Status: She is alert and oriented to person, place, and time. Raymundo Rosenbaum was seen today for sinus problem. Diagnoses and all orders for this visit:    Acute maxillary sinusitis, recurrence not specified  -     methylPREDNISolone (MEDROL DOSEPACK) 4 MG tablet; Take by mouth. -     benzonatate (TESSALON) 200 MG capsule; Take 1 capsule by mouth 3 times daily as needed for Cough  -     sodium chloride (ALTAMIST SPRAY) 0.65 % nasal spray; 1 spray by Nasal route as needed for Congestion     Will do steroid pack, pt will increase levemir by 2 units and continue to go up by 2 qod while on steroid as well as increase ssi by 1 unit each meal to keep bgl <150, if she gets to 200 and cannot control will let us know, she has done similar when she had steroid inj in her shoulder recently. She is well controlled dm a1c of 6.8 recently. Will stop flonase for now due to nose bleed and try saline spray. Sx only for 2 days but if no improvement Monday will let us know and we can consider abx. Covid neg at home. Follow up prn. No follow-up provider specified. Consent: This patient and/or their healthcare decision maker is aware that this patient-initiated Telehealth encounter is a billable service, with coverage as determined by their insurance carrier. Patient is aware that they may receive a bill and has provided verbal consent to proceed: Yes    Michael Larose, was evaluated through a synchronous (real-time) audio-video encounter. The patient (or guardian if applicable) is aware that this is a billable service, which includes applicable co-pays. This Virtual Visit was conducted with patient's (and/or legal guardian's) consent.  The visit was conducted pursuant to the emergency declaration under the [facility], [address] [place] [employer] authority and the [place] and [employer] General Act. Patient identification was verified, and a caregiver was present when appropriate. The patient was located at Home: [address] [place] [address]  Provider was located at [facility] (Appt Dept): Catarino Biggs [address] [place] [address]         20 minutes  in the office  doxy. STEFFI Quiñones NP

## 2023-03-06 ENCOUNTER — PATIENT MESSAGE (OUTPATIENT)
Dept: INTERNAL MEDICINE CLINIC | Facility: CLINIC | Age: 63
End: 2023-03-06

## 2023-03-06 NOTE — TELEPHONE ENCOUNTER
From: Fredis Vega  To: Dr. Patrice Seay: 3/6/2023 11:53 AM EST  Subject: UTI Infection    Last Wednesday I went to [address] for a urine culture. Yesterday the called and said I had a rare bacteria, klebsiella pneumoniae greater than [id number]/ml. They put me on 100 mg of cefpodoxime twice a day for 7 days. I would like to talk to someone today if possible regarding this. We have anniversary travel plans beginning on Saturday. Thanks.

## 2023-03-16 ENCOUNTER — PATIENT MESSAGE (OUTPATIENT)
Dept: INTERNAL MEDICINE CLINIC | Facility: CLINIC | Age: 63
End: 2023-03-16

## 2023-03-18 SDOH — ECONOMIC STABILITY: TRANSPORTATION INSECURITY
IN THE PAST 12 MONTHS, HAS LACK OF TRANSPORTATION KEPT YOU FROM MEETINGS, WORK, OR FROM GETTING THINGS NEEDED FOR DAILY LIVING?: NO

## 2023-03-18 SDOH — ECONOMIC STABILITY: INCOME INSECURITY: HOW HARD IS IT FOR YOU TO PAY FOR THE VERY BASICS LIKE FOOD, HOUSING, MEDICAL CARE, AND HEATING?: NOT HARD AT ALL

## 2023-03-18 SDOH — ECONOMIC STABILITY: FOOD INSECURITY: WITHIN THE PAST 12 MONTHS, THE FOOD YOU BOUGHT JUST DIDN'T LAST AND YOU DIDN'T HAVE MONEY TO GET MORE.: NEVER TRUE

## 2023-03-18 SDOH — ECONOMIC STABILITY: FOOD INSECURITY: WITHIN THE PAST 12 MONTHS, YOU WORRIED THAT YOUR FOOD WOULD RUN OUT BEFORE YOU GOT MONEY TO BUY MORE.: NEVER TRUE

## 2023-03-21 ENCOUNTER — OFFICE VISIT (OUTPATIENT)
Dept: INTERNAL MEDICINE CLINIC | Facility: CLINIC | Age: 63
End: 2023-03-21
Payer: COMMERCIAL

## 2023-03-21 VITALS
HEIGHT: 66 IN | RESPIRATION RATE: 18 BRPM | TEMPERATURE: 97.3 F | BODY MASS INDEX: 30.95 KG/M2 | HEART RATE: 89 BPM | WEIGHT: 192.6 LBS | SYSTOLIC BLOOD PRESSURE: 108 MMHG | DIASTOLIC BLOOD PRESSURE: 67 MMHG | OXYGEN SATURATION: 98 %

## 2023-03-21 DIAGNOSIS — Z79.4 TYPE 2 DIABETES MELLITUS WITH HYPERGLYCEMIA, WITH LONG-TERM CURRENT USE OF INSULIN (HCC): ICD-10-CM

## 2023-03-21 DIAGNOSIS — E78.5 HYPERLIPIDEMIA, UNSPECIFIED HYPERLIPIDEMIA TYPE: ICD-10-CM

## 2023-03-21 DIAGNOSIS — I25.10 CAD IN NATIVE ARTERY: ICD-10-CM

## 2023-03-21 DIAGNOSIS — G89.29 CHRONIC LOW BACK PAIN, UNSPECIFIED BACK PAIN LATERALITY, UNSPECIFIED WHETHER SCIATICA PRESENT: Primary | ICD-10-CM

## 2023-03-21 DIAGNOSIS — M54.50 CHRONIC LOW BACK PAIN, UNSPECIFIED BACK PAIN LATERALITY, UNSPECIFIED WHETHER SCIATICA PRESENT: Primary | ICD-10-CM

## 2023-03-21 DIAGNOSIS — E11.65 TYPE 2 DIABETES MELLITUS WITH HYPERGLYCEMIA, WITH LONG-TERM CURRENT USE OF INSULIN (HCC): ICD-10-CM

## 2023-03-21 LAB
ALBUMIN SERPL-MCNC: 3.6 G/DL (ref 3.2–4.6)
ALBUMIN/GLOB SERPL: 1.2 (ref 0.4–1.6)
ALP SERPL-CCNC: 85 U/L (ref 50–136)
ALT SERPL-CCNC: 23 U/L (ref 12–65)
ANION GAP SERPL CALC-SCNC: 2 MMOL/L (ref 2–11)
AST SERPL-CCNC: 12 U/L (ref 15–37)
BILIRUB SERPL-MCNC: 0.3 MG/DL (ref 0.2–1.1)
BUN SERPL-MCNC: 17 MG/DL (ref 8–23)
CALCIUM SERPL-MCNC: 8.5 MG/DL (ref 8.3–10.4)
CHLORIDE SERPL-SCNC: 110 MMOL/L (ref 101–110)
CO2 SERPL-SCNC: 28 MMOL/L (ref 21–32)
CREAT SERPL-MCNC: 0.6 MG/DL (ref 0.6–1)
EST. AVERAGE GLUCOSE BLD GHB EST-MCNC: 148 MG/DL
GLOBULIN SER CALC-MCNC: 3 G/DL (ref 2.8–4.5)
GLUCOSE SERPL-MCNC: 98 MG/DL (ref 65–100)
HBA1C MFR BLD: 6.8 % (ref 4.8–5.6)
POTASSIUM SERPL-SCNC: 4.2 MMOL/L (ref 3.5–5.1)
PROT SERPL-MCNC: 6.6 G/DL (ref 6.3–8.2)
SODIUM SERPL-SCNC: 140 MMOL/L (ref 133–143)
TSH W FREE THYROID IF ABNORMAL: 2.9 UIU/ML (ref 0.36–3.74)

## 2023-03-21 PROCEDURE — 3044F HG A1C LEVEL LT 7.0%: CPT | Performed by: INTERNAL MEDICINE

## 2023-03-21 PROCEDURE — 99214 OFFICE O/P EST MOD 30 MIN: CPT | Performed by: INTERNAL MEDICINE

## 2023-03-21 RX ORDER — ESTRADIOL 1 MG/1
1 TABLET ORAL DAILY
Qty: 90 TABLET | Refills: 3 | Status: SHIPPED | OUTPATIENT
Start: 2023-03-21 | End: 2023-12-27

## 2023-03-21 RX ORDER — BLOOD-GLUCOSE,RECEIVER,CONT
EACH MISCELLANEOUS
Status: CANCELLED | OUTPATIENT
Start: 2023-03-21

## 2023-03-21 RX ORDER — BLOOD-GLUCOSE SENSOR
EACH MISCELLANEOUS
Status: CANCELLED | OUTPATIENT
Start: 2023-03-21

## 2023-03-21 RX ORDER — INSULIN DEGLUDEC INJECTION 100 U/ML
20 INJECTION, SOLUTION SUBCUTANEOUS DAILY
Qty: 7 ADJUSTABLE DOSE PRE-FILLED PEN SYRINGE | Refills: 3 | Status: SHIPPED | OUTPATIENT
Start: 2023-03-21

## 2023-03-21 ASSESSMENT — PATIENT HEALTH QUESTIONNAIRE - PHQ9
2. FEELING DOWN, DEPRESSED OR HOPELESS: 0
SUM OF ALL RESPONSES TO PHQ QUESTIONS 1-9: 0
1. LITTLE INTEREST OR PLEASURE IN DOING THINGS: 0
SUM OF ALL RESPONSES TO PHQ QUESTIONS 1-9: 0
SUM OF ALL RESPONSES TO PHQ9 QUESTIONS 1 & 2: 0

## 2023-03-21 NOTE — PROGRESS NOTES
every 3 days for increasing and reducing tresiba (2 unit increase every 3 days for fasting >=140 and decrease for fasting <=100). 7 Adjustable Dose Pre-filled Pen Syringe 3    sodium chloride (ALTAMIST SPRAY) 0.65 % nasal spray 1 spray by Nasal route as needed for Congestion 1 each 3    BD PEN NEEDLE KERON 2ND GEN 32G X 4 MM MISC FOR USE WITH INSULIN 4 TIMES DAILY. E11.65. 200 each 11    NOVOLOG FLEXPEN 100 UNIT/ML injection pen 4 units at Breakfast, 4 units at Lunch, 5 units at dinner. Correction scale for mealtime: 1 units for 50 points greater than 150 mg/dL. Max daily dose: 40 units 39 mL 11    metFORMIN (GLUCOPHAGE-XR) 500 MG extended release tablet Take 1 tablet by mouth 2 times daily 360 tablet 11    JARDIANCE 10 MG tablet Take 1 tablet by mouth daily 90 tablet 11    OZEMPIC, 2 MG/DOSE, 8 MG/3ML SOPN Inject 2 mg into the skin every 7 days 3 mL 11    DULoxetine (CYMBALTA) 30 MG extended release capsule TAKE 1 CAPSULE BY MOUTH EVERY DAY (Patient taking differently: 30 mg) 90 capsule 3    pantoprazole (PROTONIX) 40 MG tablet TAKE 1 TABLET BY MOUTH EVERY DAY 90 tablet 3    buPROPion (WELLBUTRIN XL) 300 MG extended release tablet Take 1 tablet by mouth every morning 90 tablet 3    ticagrelor (BRILINTA) 90 MG TABS tablet Take 1 tablet by mouth in the morning and 1 tablet in the evening.  180 tablet 3    rosuvastatin (CRESTOR) 20 MG tablet Take 1 tablet by mouth daily 90 tablet 3    nitroGLYCERIN (NITROSTAT) 0.4 MG SL tablet Place 1 tablet under the tongue every 5 minutes as needed for Chest pain 25 tablet 3    metoprolol succinate (TOPROL XL) 50 MG extended release tablet Take 1 tablet by mouth daily 90 tablet 3    montelukast (SINGULAIR) 10 MG tablet TAKE 1 TABLET BY MOUTH NIGHTLY 90 tablet 3    Blood Glucose Monitoring Suppl (ONETOUCH VERIO REFLECT) w/Device KIT Inject 1 Stick into the skin 4 times daily (before meals and nightly) 1 kit 0    Lancets (ONETOUCH DELICA PLUS [id number]) MISC Inject 1 strip into the skin

## 2023-03-22 ENCOUNTER — PATIENT MESSAGE (OUTPATIENT)
Dept: INTERNAL MEDICINE CLINIC | Facility: CLINIC | Age: 63
End: 2023-03-22

## 2023-03-22 DIAGNOSIS — E11.65 TYPE 2 DIABETES MELLITUS WITH HYPERGLYCEMIA, WITH LONG-TERM CURRENT USE OF INSULIN (HCC): ICD-10-CM

## 2023-03-22 DIAGNOSIS — Z79.4 TYPE 2 DIABETES MELLITUS WITH HYPERGLYCEMIA, WITH LONG-TERM CURRENT USE OF INSULIN (HCC): ICD-10-CM

## 2023-03-22 RX ORDER — PROCHLORPERAZINE 25 MG/1
SUPPOSITORY RECTAL
Qty: 1 EACH | Refills: 11 | Status: SHIPPED | OUTPATIENT
Start: 2023-03-22

## 2023-03-22 RX ORDER — PROCHLORPERAZINE 25 MG/1
SUPPOSITORY RECTAL
Qty: 9 EACH | Refills: 11 | Status: SHIPPED | OUTPATIENT
Start: 2023-03-22

## 2023-03-22 RX ORDER — PROCHLORPERAZINE 25 MG/1
SUPPOSITORY RECTAL
Qty: 1 EACH | Refills: 0 | Status: SHIPPED | OUTPATIENT
Start: 2023-03-22

## 2023-03-22 NOTE — TELEPHONE ENCOUNTER
From: Graciela Christensen  To: Dr. Palacios Graves: 3/22/2023 7:04 AM EDT  Subject: Shadi Sy you able to submit the pre-authorization for me to get the new Dexcom? Thanks.

## 2023-03-22 NOTE — RESULT ENCOUNTER NOTE
Labs have have improved. Continue your current doses of medications. Keep up the good work. Hope you are feeling well. Thanks.   Tenisha Reardon

## 2023-03-23 PROBLEM — R65.10 SIRS (SYSTEMIC INFLAMMATORY RESPONSE SYNDROME) (HCC): Status: RESOLVED | Noted: 2022-04-17 | Resolved: 2023-03-23

## 2023-03-23 ASSESSMENT — ENCOUNTER SYMPTOMS
DIARRHEA: 0
BACK PAIN: 1
WHEEZING: 0
COUGH: 0
SHORTNESS OF BREATH: 0
CONSTIPATION: 0
VOMITING: 0
BLOOD IN STOOL: 0
NAUSEA: 0

## 2023-03-29 RX ORDER — BLOOD-GLUCOSE SENSOR
EACH MISCELLANEOUS
Qty: 9 EACH | Refills: 11 | Status: SHIPPED | OUTPATIENT
Start: 2023-03-29

## 2023-03-29 RX ORDER — BLOOD-GLUCOSE,RECEIVER,CONT
EACH MISCELLANEOUS
Qty: 1 EACH | Refills: 0 | Status: SHIPPED | OUTPATIENT
Start: 2023-03-29

## 2023-03-29 NOTE — TELEPHONE ENCOUNTER
Pharmacy called today is the last day for the approval . They are needing a prescription for the [address] [facility]  for the dexcom   If they do not receive it today it will be closed and they will not cover it

## 2023-04-23 ENCOUNTER — PATIENT MESSAGE (OUTPATIENT)
Dept: INTERNAL MEDICINE CLINIC | Facility: CLINIC | Age: 63
End: 2023-04-23

## 2023-04-23 DIAGNOSIS — R30.9 PAINFUL URINATION: Primary | ICD-10-CM

## 2023-04-24 ENCOUNTER — TELEMEDICINE (OUTPATIENT)
Dept: INTERNAL MEDICINE CLINIC | Facility: CLINIC | Age: 63
End: 2023-04-24
Payer: COMMERCIAL

## 2023-04-24 DIAGNOSIS — R30.0 DYSURIA: Primary | ICD-10-CM

## 2023-04-24 DIAGNOSIS — E11.65 TYPE 2 DIABETES MELLITUS WITH HYPERGLYCEMIA, WITH LONG-TERM CURRENT USE OF INSULIN (HCC): ICD-10-CM

## 2023-04-24 DIAGNOSIS — Z79.4 TYPE 2 DIABETES MELLITUS WITH HYPERGLYCEMIA, WITH LONG-TERM CURRENT USE OF INSULIN (HCC): ICD-10-CM

## 2023-04-24 PROCEDURE — 99213 OFFICE O/P EST LOW 20 MIN: CPT | Performed by: INTERNAL MEDICINE

## 2023-04-24 PROCEDURE — 3044F HG A1C LEVEL LT 7.0%: CPT | Performed by: INTERNAL MEDICINE

## 2023-04-24 RX ORDER — NITROFURANTOIN 25; 75 MG/1; MG/1
100 CAPSULE ORAL 2 TIMES DAILY
Qty: 10 CAPSULE | Refills: 0 | Status: SHIPPED | OUTPATIENT
Start: 2023-04-24 | End: 2023-04-29

## 2023-04-24 ASSESSMENT — PATIENT HEALTH QUESTIONNAIRE - PHQ9
1. LITTLE INTEREST OR PLEASURE IN DOING THINGS: 0
SUM OF ALL RESPONSES TO PHQ QUESTIONS 1-9: 0
SUM OF ALL RESPONSES TO PHQ QUESTIONS 1-9: 0
SUM OF ALL RESPONSES TO PHQ9 QUESTIONS 1 & 2: 0
SUM OF ALL RESPONSES TO PHQ QUESTIONS 1-9: 0
2. FEELING DOWN, DEPRESSED OR HOPELESS: 0
SUM OF ALL RESPONSES TO PHQ QUESTIONS 1-9: 0

## 2023-04-24 NOTE — PROGRESS NOTES
2023 12:49 PM  Location:[facility] [address] [facility]  [place]  Patient #:  [id number]  YOB: 1960            History of Present Illness     Chief Complaint   Patient presents with    Dysuria     Pt has cloudy urine, urinary frequency and urinary pain starting Saturday but worsening yesterday. Ms. Kathy Hamman is a 65 y.o. female  who presents for follow up on chronic medical problems. Notes that she is tired. Notes that she has had somewhat elevated sugars but they are much improved since switching from Levemir to [place].     Dysuria          No Known Allergies  Past Medical History:   Diagnosis Date    GERD (gastroesophageal reflux disease)     SIRS (systemic inflammatory response syndrome) ([facility] 2022    Type 2 diabetes mellitus (HCC)     urinary tract infection      Social History     Socioeconomic History    Marital status:      Spouse name: None    Number of children: None    Years of education: None    Highest education level: None   Tobacco Use    Smoking status: Former     Packs/day: 1.00     Types: Cigarettes     Quit date: 2021     Years since quittin.8    Smokeless tobacco: Never   Substance and Sexual Activity    Alcohol use: Not Currently    Drug use: Not Currently     Social Determinants of Health     Financial Resource Strain: Low Risk     Difficulty of Paying Living Expenses: Not hard at all   Food Insecurity: No Food Insecurity    Worried About Running Out of Food in the Last Year: Never true    Ran Out of Food in the Last Year: Never true   Transportation Needs: Unknown    Lack of Transportation (Non-Medical): No   Housing Stability: Unknown    Unstable Housing in the Last Year: No     Past Surgical History:   Procedure Laterality Date    APPENDECTOMY      CHOLECYSTECTOMY      HYSTERECTOMY (CERVIX STATUS UNKNOWN)      ovaries removed    PTCA      times 2     Current Outpatient Medications   Medication Sig Dispense Refill

## 2023-04-27 RX ORDER — CIPROFLOXACIN 500 MG/1
500 TABLET, FILM COATED ORAL 2 TIMES DAILY
Qty: 10 TABLET | Refills: 0 | Status: SHIPPED | OUTPATIENT
Start: 2023-04-27 | End: 2023-05-02

## 2023-04-28 RX ORDER — SULFAMETHOXAZOLE AND TRIMETHOPRIM 800; 160 MG/1; MG/1
1 TABLET ORAL 2 TIMES DAILY
Qty: 10 TABLET | Refills: 0 | Status: SHIPPED | OUTPATIENT
Start: 2023-04-28 | End: 2023-05-03

## 2023-05-11 RX ORDER — ONDANSETRON 4 MG/1
TABLET, ORALLY DISINTEGRATING ORAL
Qty: 18 TABLET | Refills: 3 | Status: SHIPPED | OUTPATIENT
Start: 2023-05-11

## 2023-05-17 ENCOUNTER — PATIENT MESSAGE (OUTPATIENT)
Dept: INTERNAL MEDICINE CLINIC | Facility: CLINIC | Age: 63
End: 2023-05-17

## 2023-05-17 DIAGNOSIS — I70.0 ARTERIOSCLEROSIS OF ABDOMINAL AORTA (HCC): ICD-10-CM

## 2023-05-17 DIAGNOSIS — G89.29 CHRONIC LOW BACK PAIN, UNSPECIFIED BACK PAIN LATERALITY, UNSPECIFIED WHETHER SCIATICA PRESENT: Primary | ICD-10-CM

## 2023-05-17 DIAGNOSIS — M54.50 CHRONIC LOW BACK PAIN, UNSPECIFIED BACK PAIN LATERALITY, UNSPECIFIED WHETHER SCIATICA PRESENT: Primary | ICD-10-CM

## 2023-05-17 NOTE — TELEPHONE ENCOUNTER
From: Sanjeev Amaral  To: Dr. Cain Lon2023 7:08 AM EDT  Subject: Spine X-ray     Misty Later,    I have been having a lot of lower back issues lately. By weeks end, I will have the back X ray you gave me a prescription for. We have travel plans for early  so if you can get back to me as soon as possible when you get the results, it would be appreciated.

## 2023-05-18 DIAGNOSIS — G89.29 CHRONIC LOW BACK PAIN, UNSPECIFIED BACK PAIN LATERALITY, UNSPECIFIED WHETHER SCIATICA PRESENT: ICD-10-CM

## 2023-05-18 DIAGNOSIS — M54.50 CHRONIC LOW BACK PAIN, UNSPECIFIED BACK PAIN LATERALITY, UNSPECIFIED WHETHER SCIATICA PRESENT: ICD-10-CM

## 2023-05-23 ENCOUNTER — OFFICE VISIT (OUTPATIENT)
Dept: VASCULAR SURGERY | Age: 63
End: 2023-05-23
Payer: COMMERCIAL

## 2023-05-23 VITALS
DIASTOLIC BLOOD PRESSURE: 66 MMHG | WEIGHT: 191 LBS | HEIGHT: 66 IN | OXYGEN SATURATION: 94 % | BODY MASS INDEX: 30.7 KG/M2 | SYSTOLIC BLOOD PRESSURE: 130 MMHG | HEART RATE: 82 BPM

## 2023-05-23 DIAGNOSIS — I73.9 PVD (PERIPHERAL VASCULAR DISEASE) WITH CLAUDICATION (HCC): Primary | ICD-10-CM

## 2023-05-23 PROCEDURE — 99204 OFFICE O/P NEW MOD 45 MIN: CPT | Performed by: SURGERY

## 2023-05-23 NOTE — PROGRESS NOTES
[address]   [address] [facility] FAX: [address]   1960    Chief Complaint   Patient presents with    New Patient     Aorta evaluation. HPI   Ms. Veronica Nicolas is a 65y.o. year old female who for rule out aneurysm repair. Patient has chronic back pain for last several months. Pain is mostly in her lower back radiates down to her buttock area. She denies any claudication or rest pain symptoms. Current Outpatient Medications   Medication Sig Dispense Refill    ondansetron (ZOFRAN-ODT) 4 MG disintegrating tablet TAKE 1 TABLET BY MOUTH EVERY 8 HOURS AS NEEDED FOR NAUSEA AND VOMITING 18 tablet 3    Continuous Blood Gluc Sensor (DEXCOM G7 SENSOR) MISC Change sensor every 10 days 9 each 3    Continuous Blood Gluc  (DEXCOM G7 ) SHERINE Use daily 1 each 0    Continuous Blood Gluc  (DEXCOM G7 ) SHERINE USE DAILY FOR CONTINOUS BLOOD GLUCOSE MONITORING - ONE  PER YEAR 1 each 0    Continuous Blood Gluc  (DEXCOM G6 ) SHERINE USE DAILY FOR CONTINOUS BLOOD GLUCOSE MONITORING - ONE  PER YEAR 1 each 0    Continuous Blood Gluc Sensor (DEXCOM G6 SENSOR) MISC Change every 10 days as directed. 9 each 11    Continuous Blood Gluc Transmit (DEXCOM G6 TRANSMITTER) MISC Change every 90 days as directed. 1 each 11    estradiol (ESTRACE) 1 MG tablet Take 1 tablet by mouth daily 90 tablet 3    Insulin Degludec (TRESIBA FLEXTOUCH) 100 UNIT/ML SOPN Inject 20 Units into the skin daily Apply sliding scale every 3 days for increasing and reducing tresiba (2 unit increase every 3 days for fasting >=140 and decrease for fasting <=100). 7 Adjustable Dose Pre-filled Pen Syringe 3    sodium chloride (ALTAMIST SPRAY) 0.65 % nasal spray 1 spray by Nasal route as needed for Congestion 1 each 3    BD PEN NEEDLE KERON 2ND GEN 32G X 4 MM MISC FOR USE WITH INSULIN 4 TIMES DAILY.  E11.65. 200 each [facility]

## 2023-05-25 ENCOUNTER — TELEPHONE (OUTPATIENT)
Dept: ORTHOPEDIC SURGERY | Age: 63
End: 2023-05-25

## 2023-05-25 ENCOUNTER — OFFICE VISIT (OUTPATIENT)
Dept: ORTHOPEDIC SURGERY | Age: 63
End: 2023-05-25
Payer: COMMERCIAL

## 2023-05-25 VITALS — WEIGHT: 191 LBS | BODY MASS INDEX: 30.7 KG/M2 | HEIGHT: 66 IN

## 2023-05-25 DIAGNOSIS — M51.36 DDD (DEGENERATIVE DISC DISEASE), LUMBAR: ICD-10-CM

## 2023-05-25 DIAGNOSIS — M47.816 LUMBAR SPONDYLOSIS: ICD-10-CM

## 2023-05-25 DIAGNOSIS — M54.16 LUMBAR RADICULOPATHY: Primary | ICD-10-CM

## 2023-05-25 PROBLEM — I15.2 HYPERTENSION ASSOCIATED WITH TYPE 2 DIABETES MELLITUS (HCC): Chronic | Status: ACTIVE | Noted: 2023-05-25

## 2023-05-25 PROBLEM — E11.59 HYPERTENSION ASSOCIATED WITH TYPE 2 DIABETES MELLITUS (HCC): Chronic | Status: ACTIVE | Noted: 2023-05-25

## 2023-05-25 PROBLEM — E11.29 TYPE 2 DIABETES MELLITUS WITH MICROALBUMINURIA, WITH LONG-TERM CURRENT USE OF INSULIN (HCC): Status: ACTIVE | Noted: 2022-09-08

## 2023-05-25 PROBLEM — Z79.4 TYPE 2 DIABETES MELLITUS WITH MICROALBUMINURIA, WITH LONG-TERM CURRENT USE OF INSULIN (HCC): Status: ACTIVE | Noted: 2022-09-08

## 2023-05-25 PROBLEM — I15.2 HYPERTENSION ASSOCIATED WITH TYPE 2 DIABETES MELLITUS (HCC): Status: ACTIVE | Noted: 2023-05-25

## 2023-05-25 PROBLEM — E11.29 TYPE 2 DIABETES MELLITUS WITH MICROALBUMINURIA, WITH LONG-TERM CURRENT USE OF INSULIN (HCC): Chronic | Status: ACTIVE | Noted: 2022-09-08

## 2023-05-25 PROBLEM — R80.9 TYPE 2 DIABETES MELLITUS WITH MICROALBUMINURIA, WITH LONG-TERM CURRENT USE OF INSULIN (HCC): Chronic | Status: ACTIVE | Noted: 2022-09-08

## 2023-05-25 PROBLEM — E11.59 HYPERTENSION ASSOCIATED WITH TYPE 2 DIABETES MELLITUS (HCC): Status: ACTIVE | Noted: 2023-05-25

## 2023-05-25 PROBLEM — Z79.4 TYPE 2 DIABETES MELLITUS WITH MICROALBUMINURIA, WITH LONG-TERM CURRENT USE OF INSULIN (HCC): Chronic | Status: ACTIVE | Noted: 2022-09-08

## 2023-05-25 PROCEDURE — 99204 OFFICE O/P NEW MOD 45 MIN: CPT | Performed by: PHYSICIAN ASSISTANT

## 2023-05-25 RX ORDER — GABAPENTIN 300 MG/1
300 CAPSULE ORAL 2 TIMES DAILY
Qty: 60 CAPSULE | Refills: 0 | Status: SHIPPED | OUTPATIENT
Start: 2023-05-25 | End: 2023-06-24

## 2023-05-25 RX ORDER — TIZANIDINE 2 MG/1
TABLET ORAL
Qty: 30 TABLET | Refills: 0 | Status: SHIPPED | OUTPATIENT
Start: 2023-05-25

## 2023-05-25 NOTE — PROGRESS NOTES
Patient Medications    Current Outpatient Medications   Medication Sig Dispense Refill    ondansetron (ZOFRAN-ODT) 4 MG disintegrating tablet TAKE 1 TABLET BY MOUTH EVERY 8 HOURS AS NEEDED FOR NAUSEA AND VOMITING 18 tablet 3    Continuous Blood Gluc Sensor (DEXCOM G7 SENSOR) MISC Change sensor every 10 days 9 each 3    Continuous Blood Gluc  (DEXCOM G7 ) SHERINE Use daily 1 each 0    Continuous Blood Gluc  (DEXCOM G7 ) SHERINE USE DAILY FOR CONTINOUS BLOOD GLUCOSE MONITORING - ONE  PER YEAR 1 each 0    Continuous Blood Gluc  (DEXCOM G6 ) SHERINE USE DAILY FOR CONTINOUS BLOOD GLUCOSE MONITORING - ONE  PER YEAR 1 each 0    Continuous Blood Gluc Sensor (DEXCOM G6 SENSOR) MISC Change every 10 days as directed. 9 each 11    Continuous Blood Gluc Transmit (DEXCOM G6 TRANSMITTER) MISC Change every 90 days as directed. 1 each 11    estradiol (ESTRACE) 1 MG tablet Take 1 tablet by mouth daily 90 tablet 3    Insulin Degludec (TRESIBA FLEXTOUCH) 100 UNIT/ML SOPN Inject 20 Units into the skin daily Apply sliding scale every 3 days for increasing and reducing tresiba (2 unit increase every 3 days for fasting >=140 and decrease for fasting <=100). 7 Adjustable Dose Pre-filled Pen Syringe 3    sodium chloride (ALTAMIST SPRAY) 0.65 % nasal spray 1 spray by Nasal route as needed for Congestion 1 each 3    BD PEN NEEDLE KERON 2ND GEN 32G X 4 MM MISC FOR USE WITH INSULIN 4 TIMES DAILY. E11.65. 200 each 11    NOVOLOG FLEXPEN 100 UNIT/ML injection pen 4 units at Breakfast, 4 units at Lunch, 5 units at dinner. Correction scale for mealtime: 1 units for 50 points greater than 150 mg/dL.  Max daily dose: 40 units 39 mL 11    metFORMIN (GLUCOPHAGE-XR) 500 MG extended release tablet Take 1 tablet by mouth 2 times daily 360 tablet 11    JARDIANCE 10 MG tablet Take 1 tablet by mouth daily 90 tablet 11    OZEMPIC, 2 MG/DOSE, 8 MG/3ML SOPN Inject 2 mg into the skin every 7 days 3 mL 11

## 2023-05-25 NOTE — TELEPHONE ENCOUNTER
She was just seen and they want to make sure it the rx is sent to the correct pharmacy. I have verified [facility]'s [facility] and they are hoping to get these asap.

## 2023-06-16 DIAGNOSIS — M51.36 DDD (DEGENERATIVE DISC DISEASE), LUMBAR: ICD-10-CM

## 2023-06-16 DIAGNOSIS — M54.16 LUMBAR RADICULOPATHY: ICD-10-CM

## 2023-06-16 DIAGNOSIS — M47.816 LUMBAR SPONDYLOSIS: ICD-10-CM

## 2023-06-19 RX ORDER — TIZANIDINE 2 MG/1
TABLET ORAL
Qty: 30 TABLET | Refills: 0 | OUTPATIENT
Start: 2023-06-19

## 2023-06-21 DIAGNOSIS — M47.816 LUMBAR SPONDYLOSIS: ICD-10-CM

## 2023-06-21 DIAGNOSIS — M51.36 DDD (DEGENERATIVE DISC DISEASE), LUMBAR: ICD-10-CM

## 2023-06-21 DIAGNOSIS — M54.16 LUMBAR RADICULOPATHY: ICD-10-CM

## 2023-06-22 RX ORDER — TIZANIDINE 2 MG/1
TABLET ORAL
Qty: 30 TABLET | Refills: 0 | OUTPATIENT
Start: 2023-06-22

## 2023-06-29 ENCOUNTER — OFFICE VISIT (OUTPATIENT)
Dept: ORTHOPEDIC SURGERY | Age: 63
End: 2023-06-29

## 2023-06-29 DIAGNOSIS — M51.36 DDD (DEGENERATIVE DISC DISEASE), LUMBAR: ICD-10-CM

## 2023-06-29 DIAGNOSIS — M54.16 LUMBAR RADICULOPATHY: Primary | ICD-10-CM

## 2023-06-29 DIAGNOSIS — M47.816 LUMBAR SPONDYLOSIS: ICD-10-CM

## 2023-06-29 RX ORDER — TRIAMCINOLONE ACETONIDE 40 MG/ML
40 INJECTION, SUSPENSION INTRA-ARTICULAR; INTRAMUSCULAR ONCE
Status: COMPLETED | OUTPATIENT
Start: 2023-06-29 | End: 2023-06-29

## 2023-06-29 RX ADMIN — TRIAMCINOLONE ACETONIDE 40 MG: 40 INJECTION, SUSPENSION INTRA-ARTICULAR; INTRAMUSCULAR at 10:33

## 2023-07-10 ASSESSMENT — PATIENT HEALTH QUESTIONNAIRE - PHQ9
SUM OF ALL RESPONSES TO PHQ QUESTIONS 1-9: 0
1. LITTLE INTEREST OR PLEASURE IN DOING THINGS: 0
SUM OF ALL RESPONSES TO PHQ9 QUESTIONS 1 & 2: 0
SUM OF ALL RESPONSES TO PHQ QUESTIONS 1-9: 0
2. FEELING DOWN, DEPRESSED OR HOPELESS: NOT AT ALL
SUM OF ALL RESPONSES TO PHQ9 QUESTIONS 1 & 2: 0
SUM OF ALL RESPONSES TO PHQ QUESTIONS 1-9: 0
SUM OF ALL RESPONSES TO PHQ QUESTIONS 1-9: 0
1. LITTLE INTEREST OR PLEASURE IN DOING THINGS: NOT AT ALL
2. FEELING DOWN, DEPRESSED OR HOPELESS: 0

## 2023-07-11 ENCOUNTER — OFFICE VISIT (OUTPATIENT)
Dept: INTERNAL MEDICINE CLINIC | Facility: CLINIC | Age: 63
End: 2023-07-11
Payer: COMMERCIAL

## 2023-07-11 ENCOUNTER — PATIENT MESSAGE (OUTPATIENT)
Dept: INTERNAL MEDICINE CLINIC | Facility: CLINIC | Age: 63
End: 2023-07-11

## 2023-07-11 VITALS
SYSTOLIC BLOOD PRESSURE: 112 MMHG | TEMPERATURE: 97.3 F | BODY MASS INDEX: 29.8 KG/M2 | HEART RATE: 90 BPM | DIASTOLIC BLOOD PRESSURE: 64 MMHG | RESPIRATION RATE: 18 BRPM | OXYGEN SATURATION: 97 % | HEIGHT: 66 IN | WEIGHT: 185.4 LBS

## 2023-07-11 DIAGNOSIS — G89.29 CHRONIC LOW BACK PAIN, UNSPECIFIED BACK PAIN LATERALITY, UNSPECIFIED WHETHER SCIATICA PRESENT: ICD-10-CM

## 2023-07-11 DIAGNOSIS — E11.29 TYPE 2 DIABETES MELLITUS WITH MICROALBUMINURIA, WITH LONG-TERM CURRENT USE OF INSULIN (HCC): Chronic | ICD-10-CM

## 2023-07-11 DIAGNOSIS — E11.59 HYPERTENSION ASSOCIATED WITH TYPE 2 DIABETES MELLITUS (HCC): Primary | Chronic | ICD-10-CM

## 2023-07-11 DIAGNOSIS — Z87.891 SMOKING HISTORY: ICD-10-CM

## 2023-07-11 DIAGNOSIS — Z87.891 PERSONAL HISTORY OF TOBACCO USE: ICD-10-CM

## 2023-07-11 DIAGNOSIS — Z79.4 TYPE 2 DIABETES MELLITUS WITH DIABETIC NEUROPATHY, WITH LONG-TERM CURRENT USE OF INSULIN (HCC): ICD-10-CM

## 2023-07-11 DIAGNOSIS — Z12.2 SCREENING FOR LUNG CANCER: ICD-10-CM

## 2023-07-11 DIAGNOSIS — R53.83 FATIGUE, UNSPECIFIED TYPE: ICD-10-CM

## 2023-07-11 DIAGNOSIS — Z79.4 TYPE 2 DIABETES MELLITUS WITH HYPERGLYCEMIA, WITH LONG-TERM CURRENT USE OF INSULIN (HCC): ICD-10-CM

## 2023-07-11 DIAGNOSIS — R80.9 TYPE 2 DIABETES MELLITUS WITH MICROALBUMINURIA, WITH LONG-TERM CURRENT USE OF INSULIN (HCC): Chronic | ICD-10-CM

## 2023-07-11 DIAGNOSIS — I15.2 HYPERTENSION ASSOCIATED WITH TYPE 2 DIABETES MELLITUS (HCC): Primary | Chronic | ICD-10-CM

## 2023-07-11 DIAGNOSIS — M54.50 CHRONIC LOW BACK PAIN, UNSPECIFIED BACK PAIN LATERALITY, UNSPECIFIED WHETHER SCIATICA PRESENT: ICD-10-CM

## 2023-07-11 DIAGNOSIS — I25.5 ISCHEMIC CARDIOMYOPATHY: ICD-10-CM

## 2023-07-11 DIAGNOSIS — Z79.4 TYPE 2 DIABETES MELLITUS WITH MICROALBUMINURIA, WITH LONG-TERM CURRENT USE OF INSULIN (HCC): Chronic | ICD-10-CM

## 2023-07-11 DIAGNOSIS — Z00.00 GENERAL MEDICAL EXAMINATION: ICD-10-CM

## 2023-07-11 DIAGNOSIS — E11.65 TYPE 2 DIABETES MELLITUS WITH HYPERGLYCEMIA, WITH LONG-TERM CURRENT USE OF INSULIN (HCC): ICD-10-CM

## 2023-07-11 DIAGNOSIS — E11.40 TYPE 2 DIABETES MELLITUS WITH DIABETIC NEUROPATHY, WITH LONG-TERM CURRENT USE OF INSULIN (HCC): ICD-10-CM

## 2023-07-11 DIAGNOSIS — I25.10 CAD IN NATIVE ARTERY: ICD-10-CM

## 2023-07-11 LAB
BASOPHILS # BLD: 0.1 K/UL (ref 0–0.2)
BASOPHILS NFR BLD: 1 % (ref 0–2)
CREAT UR-MCNC: 66 MG/DL
DIFFERENTIAL METHOD BLD: NORMAL
EOSINOPHIL # BLD: 0.2 K/UL (ref 0–0.8)
EOSINOPHIL NFR BLD: 2 % (ref 0.5–7.8)
ERYTHROCYTE [DISTWIDTH] IN BLOOD BY AUTOMATED COUNT: 14.3 % (ref 11.9–14.6)
HCT VFR BLD AUTO: 43.7 % (ref 35.8–46.3)
HGB BLD-MCNC: 13.9 G/DL (ref 11.7–15.4)
IMM GRANULOCYTES # BLD AUTO: 0.1 K/UL (ref 0–0.5)
IMM GRANULOCYTES NFR BLD AUTO: 1 % (ref 0–5)
LYMPHOCYTES # BLD: 1.8 K/UL (ref 0.5–4.6)
LYMPHOCYTES NFR BLD: 18 % (ref 13–44)
MCH RBC QN AUTO: 29.2 PG (ref 26.1–32.9)
MCHC RBC AUTO-ENTMCNC: 31.8 G/DL (ref 31.4–35)
MCV RBC AUTO: 91.8 FL (ref 82–102)
MICROALBUMIN UR-MCNC: 0.85 MG/DL
MICROALBUMIN/CREAT UR-RTO: 13 MG/G (ref 0–30)
MONOCYTES # BLD: 0.8 K/UL (ref 0.1–1.3)
MONOCYTES NFR BLD: 8 % (ref 4–12)
NEUTS SEG # BLD: 7.3 K/UL (ref 1.7–8.2)
NEUTS SEG NFR BLD: 70 % (ref 43–78)
NRBC # BLD: 0 K/UL (ref 0–0.2)
PLATELET # BLD AUTO: 258 K/UL (ref 150–450)
PMV BLD AUTO: 10.7 FL (ref 9.4–12.3)
RBC # BLD AUTO: 4.76 M/UL (ref 4.05–5.2)
WBC # BLD AUTO: 10.2 K/UL (ref 4.3–11.1)

## 2023-07-11 PROCEDURE — 99396 PREV VISIT EST AGE 40-64: CPT | Performed by: INTERNAL MEDICINE

## 2023-07-11 PROCEDURE — G0296 VISIT TO DETERM LDCT ELIG: HCPCS | Performed by: INTERNAL MEDICINE

## 2023-07-11 PROCEDURE — 99213 OFFICE O/P EST LOW 20 MIN: CPT | Performed by: INTERNAL MEDICINE

## 2023-07-11 PROCEDURE — 3078F DIAST BP <80 MM HG: CPT | Performed by: INTERNAL MEDICINE

## 2023-07-11 PROCEDURE — 3074F SYST BP LT 130 MM HG: CPT | Performed by: INTERNAL MEDICINE

## 2023-07-11 RX ORDER — INSULIN DEGLUDEC INJECTION 100 U/ML
20 INJECTION, SOLUTION SUBCUTANEOUS DAILY
Qty: 10 ADJUSTABLE DOSE PRE-FILLED PEN SYRINGE | Refills: 3 | Status: SHIPPED | OUTPATIENT
Start: 2023-07-11

## 2023-07-11 ASSESSMENT — ENCOUNTER SYMPTOMS
BLOOD IN STOOL: 0
NAUSEA: 0
WHEEZING: 0
DIARRHEA: 0
VOMITING: 0
COUGH: 0
SHORTNESS OF BREATH: 0
CONSTIPATION: 0

## 2023-07-11 NOTE — TELEPHONE ENCOUNTER
From: Juan Voss  To: Dr. Christophe Rolle: 7/11/2023 4:13 PM EDT  Subject: [facility]    When we were talking today about my [employer] [facility] refills, you said the 90 day prescription was written for 7 pens. Kemar Pires checked when we got home and found we were receiving 5 pens. We asked our pharmacist and she advised they come 5 pens per box and they are not allowed to split boxes. We are usually right on the brink of running out with the 5 pens. Any thoughts on what should be done?

## 2023-07-11 NOTE — TELEPHONE ENCOUNTER
From: Hampton Schilder  To: Dr. Arthur Board: 7/11/2023 3:28 PM EDT  Subject: Diabetic Care    When I left today, you said to come back in 3 months. The earliest I could make an appointment was 6 months in January. Just wanted to make sure you were aware of this.

## 2023-07-12 ENCOUNTER — PATIENT MESSAGE (OUTPATIENT)
Dept: INTERNAL MEDICINE CLINIC | Facility: CLINIC | Age: 63
End: 2023-07-12

## 2023-07-12 LAB
EST. AVERAGE GLUCOSE BLD GHB EST-MCNC: 140 MG/DL
HBA1C MFR BLD: 6.5 % (ref 4.8–5.6)

## 2023-07-12 NOTE — RESULT ENCOUNTER NOTE
Labs have have improved. You are spilling less protein in your urine your hemoglobin A1c is down to 6.5. Continue your current doses of medications. Keep up the good work. Hope you are feeling well. Thanks.   [address]

## 2023-07-20 ENCOUNTER — OFFICE VISIT (OUTPATIENT)
Dept: ORTHOPEDIC SURGERY | Age: 63
End: 2023-07-20

## 2023-07-20 DIAGNOSIS — M47.816 LUMBAR SPONDYLOSIS: Primary | ICD-10-CM

## 2023-07-20 NOTE — PROGRESS NOTES
spinal canal or neural foraminal narrowing. L3-L4: No spinal canal or neural foraminal narrowing. L4-L5: Bilateral facet arthropathy with ligamentum flavum hypertrophy. Shallow  circumferential disc bulge with superimposed central disc protrusion. Mild  spinal canal stenosis. No significant neural foraminal narrowing. L5-S1: Mild bilateral facet arthropathy. Shallow superficial disc bulge with  small superimposed central disc protrusion. No spinal canal or neural foraminal  narrowing. Impression  Mild multilevel lumbar spondylosis, as detailed above. This is most pronounced  at L4-L5, where there is mild spinal canal stenosis. Assessment and Plan    Patient had excellent response to the injection. I recommend surveillance for now. Will plan to follow up in 3-6 months for recheck of symptoms.

## 2023-07-31 ENCOUNTER — HOSPITAL ENCOUNTER (OUTPATIENT)
Dept: CT IMAGING | Age: 63
Discharge: HOME | End: 2023-08-02
Payer: COMMERCIAL

## 2023-07-31 DIAGNOSIS — Z87.891 PERSONAL HISTORY OF TOBACCO USE: ICD-10-CM

## 2023-07-31 PROCEDURE — 71271 CT THORAX LUNG CANCER SCR C-: CPT

## 2023-08-16 ENCOUNTER — OFFICE VISIT (OUTPATIENT)
Age: 63
End: 2023-08-16
Payer: COMMERCIAL

## 2023-08-16 VITALS
HEART RATE: 109 BPM | BODY MASS INDEX: 30.22 KG/M2 | DIASTOLIC BLOOD PRESSURE: 70 MMHG | WEIGHT: 188 LBS | SYSTOLIC BLOOD PRESSURE: 128 MMHG | HEIGHT: 66 IN

## 2023-08-16 DIAGNOSIS — I10 ESSENTIAL HYPERTENSION: ICD-10-CM

## 2023-08-16 DIAGNOSIS — E78.2 MIXED HYPERLIPIDEMIA: ICD-10-CM

## 2023-08-16 DIAGNOSIS — I25.10 CORONARY ARTERY DISEASE INVOLVING NATIVE CORONARY ARTERY OF NATIVE HEART WITHOUT ANGINA PECTORIS: Primary | ICD-10-CM

## 2023-08-16 PROCEDURE — 3078F DIAST BP <80 MM HG: CPT | Performed by: INTERNAL MEDICINE

## 2023-08-16 PROCEDURE — 93000 ELECTROCARDIOGRAM COMPLETE: CPT | Performed by: INTERNAL MEDICINE

## 2023-08-16 PROCEDURE — 3074F SYST BP LT 130 MM HG: CPT | Performed by: INTERNAL MEDICINE

## 2023-08-16 PROCEDURE — 99214 OFFICE O/P EST MOD 30 MIN: CPT | Performed by: INTERNAL MEDICINE

## 2023-08-16 RX ORDER — LOSARTAN POTASSIUM 25 MG/1
25 TABLET ORAL DAILY
Qty: 90 TABLET | Refills: 3 | Status: SHIPPED | OUTPATIENT
Start: 2023-08-16

## 2023-08-16 RX ORDER — METOPROLOL SUCCINATE 100 MG/1
100 TABLET, EXTENDED RELEASE ORAL DAILY
Qty: 90 TABLET | Refills: 3 | Status: SHIPPED | OUTPATIENT
Start: 2023-08-16

## 2023-08-16 ASSESSMENT — ENCOUNTER SYMPTOMS
SHORTNESS OF BREATH: 0
ORTHOPNEA: 0
BLOATING: 0
HEMOPTYSIS: 0
NAUSEA: 0
BACK PAIN: 0
ABDOMINAL PAIN: 0
VOMITING: 0
BLURRED VISION: 0
COUGH: 0
DOUBLE VISION: 0

## 2023-08-21 ENCOUNTER — E-VISIT (OUTPATIENT)
Dept: INTERNAL MEDICINE CLINIC | Facility: CLINIC | Age: 63
End: 2023-08-21
Payer: COMMERCIAL

## 2023-08-21 DIAGNOSIS — R05.1 ACUTE COUGH: Primary | ICD-10-CM

## 2023-08-21 PROCEDURE — 99421 OL DIG E/M SVC 5-10 MIN: CPT | Performed by: INTERNAL MEDICINE

## 2023-08-21 RX ORDER — HYDROCODONE BITARTRATE AND HOMATROPINE METHYLBROMIDE ORAL SOLUTION 5; 1.5 MG/5ML; MG/5ML
5 LIQUID ORAL EVERY 6 HOURS PRN
Qty: 150 ML | Refills: 0 | Status: SHIPPED | OUTPATIENT
Start: 2023-08-21 | End: 2023-08-29

## 2023-08-21 ASSESSMENT — LIFESTYLE VARIABLES
SMOKING_STATUS: NO, BUT I USED TO SMOKE
PACKS_PER_DAY: 1
SMOKING_YEARS: 40

## 2023-08-21 NOTE — PROGRESS NOTES
Dylon Staton (1960) initiated an asynchronous digital communication through [employer]. HPI: per patient questionnaire     Exam: not applicable    Diagnoses and all orders for this visit:  Diagnoses and all orders for this visit:    Acute cough  -     HYDROcodone homatropine (HYCODAN) 5-1.5 MG/5ML solution; Take 5 mLs by mouth every 6 hours as needed (cough) for up to 8 days. Max Daily Amount: 20 mLs          Time: EV1 - 5-10 minutes were spent on the digital evaluation and management of this patient.     Teja Lezama MD

## 2023-08-29 ENCOUNTER — TELEPHONE (OUTPATIENT)
Age: 63
End: 2023-08-29

## 2023-08-29 ENCOUNTER — PATIENT MESSAGE (OUTPATIENT)
Age: 63
End: 2023-08-29

## 2023-08-29 RX ORDER — METOPROLOL SUCCINATE 50 MG/1
75 TABLET, EXTENDED RELEASE ORAL DAILY
Qty: 135 TABLET | Refills: 3 | Status: SHIPPED | OUTPATIENT
Start: 2023-08-29

## 2023-08-29 NOTE — TELEPHONE ENCOUNTER
MD Joel Casarez RN  Caller: Unspecified (Today, 11:46 AM)  See if we can give her 50 mg pills of Toprol-XL and haveher take a pill and a half for a total of 75 mg daily.

## 2023-08-29 NOTE — TELEPHONE ENCOUNTER
Advised patient of Dr. Jamarcus Mendez' response. Advised patient to continue to monitor BP and HR and call with any further questions or concerns.    Requested Prescriptions     Signed Prescriptions Disp Refills    metoprolol succinate (TOPROL XL) 50 MG extended release tablet 135 tablet 3     Sig: Take 1.5 tablets by mouth daily     Authorizing Provider: Paty Muniz     Ordering User: Sonya Melvin     Toprol XL 50 mg 1 1/2 tabs qd, as above, E-prescribed to [facility] [employer] in [place] at patient's request.

## 2023-08-29 NOTE — TELEPHONE ENCOUNTER
Very dizzy when bending over, earlier this morning with BP-106/56, HR-84. Later this morning after exercise class, BP-112/60, HR-94. Has not been checking BP or HR at home. Increased dizziness since she increased Toprol XL to 100 mg qd as directed at 8/16/23 office visit with Dr. Em Burr. Also, taking losartan 25 mg 1/2 tab qd, Brilinta 90 mg BID, Crestor 20 mg qd, ASA 81 mg qd, and Jardiance 10 mg qd. Patient asks for Dr. Em Burr' recommendations for dizziness with low BP.

## 2023-08-29 NOTE — TELEPHONE ENCOUNTER
----- Message from Andressa Verduzco [place] sent at 8/29/2023 10:00 AM EDT -----  Regarding: FW: Blood Pressure   Contact: [phone]    ----- Message -----  From: Apolonia Zavala MA  Sent: 8/29/2023   9:52 AM EDT  To: Andressa Verduzco MA  Subject: FW: Blood Pressure                                 ----- Message -----  From: Kimber Rueda  Sent: 8/29/2023   9:37 AM EDT  To: [facility] Clinical Staff  Subject: Blood Pressure                                   I was feeling a little dizzy today when I bent over. Per your advice I took my BP. See attached. Any comments or advice?

## 2023-09-05 ENCOUNTER — PATIENT MESSAGE (OUTPATIENT)
Dept: INTERNAL MEDICINE CLINIC | Facility: CLINIC | Age: 63
End: 2023-09-05

## 2023-09-05 ENCOUNTER — TELEPHONE (OUTPATIENT)
Dept: INTERNAL MEDICINE CLINIC | Facility: CLINIC | Age: 63
End: 2023-09-05

## 2023-09-05 DIAGNOSIS — N39.0 URINARY TRACT INFECTION WITHOUT HEMATURIA, SITE UNSPECIFIED: Primary | ICD-10-CM

## 2023-09-05 RX ORDER — MONTELUKAST SODIUM 10 MG/1
TABLET ORAL
Qty: 90 TABLET | Refills: 3 | Status: SHIPPED | OUTPATIENT
Start: 2023-09-05

## 2023-09-05 RX ORDER — BUPROPION HYDROCHLORIDE 300 MG/1
TABLET ORAL
Qty: 90 TABLET | Refills: 3 | Status: SHIPPED | OUTPATIENT
Start: 2023-09-05

## 2023-09-05 NOTE — TELEPHONE ENCOUNTER
Patient is wanting to know about getting a lab order or a culture she went to the urgent care they diagnosed her with a bladder infection and gave her medication for 3 days , she said it is back 10x worse she is willing to wait and have us order labs and whatever else but she does not want to go back to the urgent care

## 2023-09-06 ENCOUNTER — NURSE ONLY (OUTPATIENT)
Dept: INTERNAL MEDICINE CLINIC | Facility: CLINIC | Age: 63
End: 2023-09-06

## 2023-09-06 DIAGNOSIS — N39.0 URINARY TRACT INFECTION WITHOUT HEMATURIA, SITE UNSPECIFIED: ICD-10-CM

## 2023-09-06 LAB
APPEARANCE UR: CLEAR
BACTERIA URNS QL MICRO: NEGATIVE /HPF
BILIRUB UR QL: NEGATIVE
CASTS URNS QL MICRO: ABNORMAL /LPF (ref 0–2)
COLOR UR: ABNORMAL
EPI CELLS #/AREA URNS HPF: ABNORMAL /HPF (ref 0–5)
GLUCOSE UR STRIP.AUTO-MCNC: >1000 MG/DL
HGB UR QL STRIP: ABNORMAL
KETONES UR QL STRIP.AUTO: NEGATIVE MG/DL
LEUKOCYTE ESTERASE UR QL STRIP.AUTO: NEGATIVE
NITRITE UR QL STRIP.AUTO: NEGATIVE
PH UR STRIP: 5.5 (ref 5–9)
PROT UR STRIP-MCNC: NEGATIVE MG/DL
RBC #/AREA URNS HPF: ABNORMAL /HPF (ref 0–5)
SP GR UR REFRACTOMETRY: 1.03 (ref 1–1.02)
UROBILINOGEN UR QL STRIP.AUTO: 0.2 EU/DL (ref 0.2–1)
WBC URNS QL MICRO: ABNORMAL /HPF (ref 0–4)

## 2023-09-07 RX ORDER — NITROFURANTOIN 25; 75 MG/1; MG/1
100 CAPSULE ORAL 2 TIMES DAILY
Qty: 10 CAPSULE | Refills: 0 | Status: SHIPPED | OUTPATIENT
Start: 2023-09-07 | End: 2023-09-12

## 2023-09-09 LAB
BACTERIA SPEC CULT: NORMAL
SERVICE CMNT-IMP: NORMAL

## 2023-09-23 ENCOUNTER — HOSPITAL ENCOUNTER (EMERGENCY)
Age: 63
Discharge: HOME | End: 2023-09-23
Attending: EMERGENCY MEDICINE
Payer: COMMERCIAL

## 2023-09-23 ENCOUNTER — APPOINTMENT (OUTPATIENT)
Dept: GENERAL RADIOLOGY | Age: 63
End: 2023-09-23
Payer: COMMERCIAL

## 2023-09-23 VITALS
SYSTOLIC BLOOD PRESSURE: 113 MMHG | OXYGEN SATURATION: 96 % | WEIGHT: 190 LBS | RESPIRATION RATE: 20 BRPM | BODY MASS INDEX: 30.53 KG/M2 | HEIGHT: 66 IN | TEMPERATURE: 98.3 F | DIASTOLIC BLOOD PRESSURE: 56 MMHG | HEART RATE: 83 BPM

## 2023-09-23 DIAGNOSIS — R07.9 CHEST PAIN, UNSPECIFIED TYPE: Primary | ICD-10-CM

## 2023-09-23 DIAGNOSIS — R07.89 ATYPICAL CHEST PAIN: ICD-10-CM

## 2023-09-23 LAB
ANION GAP SERPL CALC-SCNC: 6 MMOL/L (ref 2–11)
BASOPHILS # BLD: 0.1 K/UL (ref 0–0.2)
BASOPHILS NFR BLD: 1 % (ref 0–2)
BUN SERPL-MCNC: 14 MG/DL (ref 8–23)
CALCIUM SERPL-MCNC: 8.9 MG/DL (ref 8.3–10.4)
CHLORIDE SERPL-SCNC: 104 MMOL/L (ref 101–110)
CO2 SERPL-SCNC: 26 MMOL/L (ref 21–32)
CREAT SERPL-MCNC: 0.9 MG/DL (ref 0.6–1)
DIFFERENTIAL METHOD BLD: ABNORMAL
EKG ATRIAL RATE: 89 BPM
EKG DIAGNOSIS: NORMAL
EKG P AXIS: 66 DEGREES
EKG P-R INTERVAL: 242 MS
EKG Q-T INTERVAL: 570 MS
EKG QRS DURATION: 82 MS
EKG QTC CALCULATION (BAZETT): 693 MS
EKG R AXIS: 74 DEGREES
EKG T AXIS: 89 DEGREES
EKG VENTRICULAR RATE: 89 BPM
EOSINOPHIL # BLD: 0.1 K/UL (ref 0–0.8)
EOSINOPHIL NFR BLD: 1 % (ref 0.5–7.8)
ERYTHROCYTE [DISTWIDTH] IN BLOOD BY AUTOMATED COUNT: 14.9 % (ref 11.9–14.6)
GLUCOSE SERPL-MCNC: 118 MG/DL (ref 65–100)
HCT VFR BLD AUTO: 37.6 % (ref 35.8–46.3)
HGB BLD-MCNC: 11.6 G/DL (ref 11.7–15.4)
IMM GRANULOCYTES # BLD AUTO: 0.1 K/UL (ref 0–0.5)
IMM GRANULOCYTES NFR BLD AUTO: 1 % (ref 0–5)
LYMPHOCYTES # BLD: 1 K/UL (ref 0.5–4.6)
LYMPHOCYTES NFR BLD: 12 % (ref 13–44)
MCH RBC QN AUTO: 28.7 PG (ref 26.1–32.9)
MCHC RBC AUTO-ENTMCNC: 30.9 G/DL (ref 31.4–35)
MCV RBC AUTO: 93.1 FL (ref 82–102)
MONOCYTES # BLD: 0.9 K/UL (ref 0.1–1.3)
MONOCYTES NFR BLD: 10 % (ref 4–12)
NEUTS SEG # BLD: 6.6 K/UL (ref 1.7–8.2)
NEUTS SEG NFR BLD: 75 % (ref 43–78)
NRBC # BLD: 0 K/UL (ref 0–0.2)
PLATELET # BLD AUTO: 196 K/UL (ref 150–450)
PMV BLD AUTO: 9.7 FL (ref 9.4–12.3)
POTASSIUM SERPL-SCNC: 4.6 MMOL/L (ref 3.5–5.1)
RBC # BLD AUTO: 4.04 M/UL (ref 4.05–5.2)
SODIUM SERPL-SCNC: 136 MMOL/L (ref 133–143)
TROPONIN I SERPL HS-MCNC: 4.2 PG/ML (ref 0–14)
TROPONIN I SERPL HS-MCNC: 4.4 PG/ML (ref 0–14)
WBC # BLD AUTO: 8.7 K/UL (ref 4.3–11.1)

## 2023-09-23 PROCEDURE — 85025 COMPLETE CBC W/AUTO DIFF WBC: CPT

## 2023-09-23 PROCEDURE — 80048 BASIC METABOLIC PNL TOTAL CA: CPT

## 2023-09-23 PROCEDURE — 99285 EMERGENCY DEPT VISIT HI MDM: CPT

## 2023-09-23 PROCEDURE — 93005 ELECTROCARDIOGRAM TRACING: CPT | Performed by: EMERGENCY MEDICINE

## 2023-09-23 PROCEDURE — 93010 ELECTROCARDIOGRAM REPORT: CPT | Performed by: INTERNAL MEDICINE

## 2023-09-23 PROCEDURE — 71046 X-RAY EXAM CHEST 2 VIEWS: CPT

## 2023-09-23 PROCEDURE — 84484 ASSAY OF TROPONIN QUANT: CPT

## 2023-09-23 ASSESSMENT — PATIENT HEALTH QUESTIONNAIRE - PHQ9
1. LITTLE INTEREST OR PLEASURE IN DOING THINGS: 0
SUM OF ALL RESPONSES TO PHQ QUESTIONS 1-9: 0
2. FEELING DOWN, DEPRESSED OR HOPELESS: 0
SUM OF ALL RESPONSES TO PHQ QUESTIONS 1-9: 0
SUM OF ALL RESPONSES TO PHQ9 QUESTIONS 1 & 2: 0

## 2023-09-23 ASSESSMENT — ENCOUNTER SYMPTOMS
COUGH: 0
SHORTNESS OF BREATH: 0
WHEEZING: 0
GASTROINTESTINAL NEGATIVE: 1

## 2023-09-23 ASSESSMENT — PAIN - FUNCTIONAL ASSESSMENT: PAIN_FUNCTIONAL_ASSESSMENT: 0-10

## 2023-09-23 ASSESSMENT — LIFESTYLE VARIABLES
HOW MANY STANDARD DRINKS CONTAINING ALCOHOL DO YOU HAVE ON A TYPICAL DAY: PATIENT DOES NOT DRINK
HOW OFTEN DO YOU HAVE A DRINK CONTAINING ALCOHOL: NEVER
HOW OFTEN DO YOU HAVE A DRINK CONTAINING ALCOHOL: NEVER

## 2023-09-23 ASSESSMENT — PAIN DESCRIPTION - ORIENTATION: ORIENTATION: LEFT

## 2023-09-23 ASSESSMENT — PAIN DESCRIPTION - LOCATION: LOCATION: CHEST

## 2023-09-23 ASSESSMENT — PAIN DESCRIPTION - DESCRIPTORS: DESCRIPTORS: PRESSURE

## 2023-09-23 ASSESSMENT — PAIN SCALES - GENERAL: PAINLEVEL_OUTOF10: 8

## 2023-09-23 NOTE — DISCHARGE INSTRUCTIONS
Take your GI/reflux medications as prescribed  [facility] for [facility]-up  No present findings of acute cardiac injury but studies in the emergency department do not evaluate for progressive cardiac disease in general  Continue present medications  Return at any point with worsening or other concerns

## 2023-09-23 NOTE — ED TRIAGE NOTES
Pt arrives via POV c/o CP that started yesterday on lt side of chest. Pt has hx of cardiac stents. Denies SOB.

## 2023-09-23 NOTE — ED PROVIDER NOTES
Emergency Department Provider Note       PCP: Archana Cooper MD   Age: 59 y.o. Sex: female     DISPOSITION       No diagnosis found. Medical Decision Making     Complexity of Problems Addressed:  1 or more acute illnesses that pose a threat to life or bodily function. Data Reviewed and Analyzed:   I independently ordered and reviewed each unique test.  I reviewed external records: provider visit note from outside specialist.  I reviewed external records: previous EKG including cardiologist interpretation. The patients assessment required an independent historian: House. The reason they were needed is external observation. I independently ordered and interpreted the ED EKG in the absence of a Cardiologist.    Rate: 89  EKG Interpretation: EKG Interpretation: sinus rhythm, no evidence of arrhythmia and no acute changes  ST Segments: No acute changes from prior tracing and no evidence of acute injury  I interpreted the X-rays chest x-ray shows no cardiac enlargement. No pneumo thorax. No infiltrate or other acute changes. .  I interpreted the reviewed lab work as well. Lab work as a normal troponin and normal other studies. Pain is been for some duration and second troponin with reproducible pain on exam was not done. Discussion of management or test interpretation. Patient with some reproducible pain in the left pectoral region. No redness or rash or shingles or infectious changes. Patient states that palpation the area provokes presenting pain. Lab work and x-rays are without acute findings. Patient's prior cardiology work-up is reviewed as well. Risk of Complications and/or Morbidity of Patient Management:  Patient return with any worsening of encourage close follow-up with her cardiologist              History      Comes in with some atypical pain to the left pectoral and left periscapular region. No recall of injury or trauma. No redness or shingles-like rash.   No fever meals and nightly)    BUPROPION (WELLBUTRIN XL) 300 MG EXTENDED RELEASE TABLET    TAKE 1 TABLET BY MOUTH EVERY DAY IN THE MORNING    CARBONYL IRON (FEOSOL) 45 MG TABS    Take 1 tablet by mouth    CHOLECALCIFEROL 50 MCG (2000 UT) TABS    Take 1 tablet by mouth    CONTINUOUS BLOOD GLUC  (DEXCOM G7 ) SHERINE    Use daily    CONTINUOUS BLOOD GLUC  (DEXCOM G7 ) SHERINE    USE DAILY FOR CONTINOUS BLOOD GLUCOSE MONITORING - ONE  PER YEAR    CONTINUOUS BLOOD GLUC SENSOR (DEXCOM G7 SENSOR) MISC    Change sensor every 10 days    CYANOCOBALAMIN 100 MCG TABLET    Take 1 tablet by mouth daily    DULOXETINE (CYMBALTA) 30 MG EXTENDED RELEASE CAPSULE    TAKE 1 CAPSULE BY MOUTH EVERY DAY    ESTRADIOL (ESTRACE) 1 MG TABLET    Take 1 tablet by mouth daily    GABAPENTIN (NEURONTIN) 300 MG CAPSULE    Take 1 capsule by mouth 2 times daily for 30 days. Intended supply: 30 days    GLUCAGON (GVOKE HYPOPEN 2-PACK) 1 MG/0.2ML SOAJ    Inject 1 mg into the skin as needed    INSULIN DEGLUDEC (TRESIBA FLEXTOUCH) 100 UNIT/ML SOPN    Inject 20 Units into the skin daily Apply sliding scale every 3 days for increasing and reducing tresiba (2 unit increase every 3 days for fasting >=140 and decrease for fasting <=100).     JARDIANCE 10 MG TABLET    Take 1 tablet by mouth daily    LANCETS (ONETOUCH DELICA PLUS [id number]) MISC    Inject 1 strip into the skin 4 times daily (before meals and nightly)    LOSARTAN (COZAAR) 25 MG TABLET    Take 1 tablet by mouth daily Indications: Taking 1/2 a tablet    METFORMIN (GLUCOPHAGE-XR) 500 MG EXTENDED RELEASE TABLET    Take 1 tablet by mouth 2 times daily    METOPROLOL SUCCINATE (TOPROL XL) 50 MG EXTENDED RELEASE TABLET    Take 1.5 tablets by mouth daily    MONTELUKAST (SINGULAIR) 10 MG TABLET    TAKE 1 TABLET BY MOUTH NIGHTLY    NITROGLYCERIN (NITROSTAT) 0.4 MG SL TABLET    Place 1 tablet under the tongue every 5 minutes as needed for Chest pain    ONDANSETRON (ZOFRAN-ODT) 4 MG

## 2023-10-12 ENCOUNTER — OFFICE VISIT (OUTPATIENT)
Dept: INTERNAL MEDICINE CLINIC | Facility: CLINIC | Age: 63
End: 2023-10-12
Payer: COMMERCIAL

## 2023-10-12 VITALS
HEIGHT: 66 IN | WEIGHT: 193 LBS | OXYGEN SATURATION: 96 % | TEMPERATURE: 97.3 F | HEART RATE: 84 BPM | SYSTOLIC BLOOD PRESSURE: 111 MMHG | RESPIRATION RATE: 17 BRPM | DIASTOLIC BLOOD PRESSURE: 51 MMHG | BODY MASS INDEX: 31.02 KG/M2

## 2023-10-12 DIAGNOSIS — Z12.31 SCREENING MAMMOGRAM FOR BREAST CANCER: ICD-10-CM

## 2023-10-12 DIAGNOSIS — Z79.4 TYPE 2 DIABETES MELLITUS WITH MICROALBUMINURIA, WITH LONG-TERM CURRENT USE OF INSULIN (HCC): Primary | ICD-10-CM

## 2023-10-12 DIAGNOSIS — R39.11 URINARY HESITANCY: ICD-10-CM

## 2023-10-12 DIAGNOSIS — R80.9 TYPE 2 DIABETES MELLITUS WITH MICROALBUMINURIA, WITH LONG-TERM CURRENT USE OF INSULIN (HCC): Primary | ICD-10-CM

## 2023-10-12 DIAGNOSIS — J30.2 SEASONAL ALLERGIC RHINITIS, UNSPECIFIED TRIGGER: Chronic | ICD-10-CM

## 2023-10-12 DIAGNOSIS — E11.59 HYPERTENSION ASSOCIATED WITH TYPE 2 DIABETES MELLITUS (HCC): ICD-10-CM

## 2023-10-12 DIAGNOSIS — E11.29 TYPE 2 DIABETES MELLITUS WITH MICROALBUMINURIA, WITH LONG-TERM CURRENT USE OF INSULIN (HCC): Primary | ICD-10-CM

## 2023-10-12 DIAGNOSIS — E78.5 HYPERLIPIDEMIA, UNSPECIFIED HYPERLIPIDEMIA TYPE: ICD-10-CM

## 2023-10-12 DIAGNOSIS — I15.2 HYPERTENSION ASSOCIATED WITH TYPE 2 DIABETES MELLITUS (HCC): ICD-10-CM

## 2023-10-12 LAB
ANION GAP SERPL CALC-SCNC: 9 MMOL/L (ref 2–11)
APPEARANCE UR: CLEAR
BASOPHILS # BLD: 0.1 K/UL (ref 0–0.2)
BASOPHILS NFR BLD: 1 % (ref 0–2)
BILIRUB UR QL: NEGATIVE
BUN SERPL-MCNC: 14 MG/DL (ref 8–23)
CALCIUM SERPL-MCNC: 8.4 MG/DL (ref 8.3–10.4)
CHLORIDE SERPL-SCNC: 108 MMOL/L (ref 101–110)
CHOLEST SERPL-MCNC: 97 MG/DL
CO2 SERPL-SCNC: 26 MMOL/L (ref 21–32)
COLOR UR: ABNORMAL
CREAT SERPL-MCNC: 0.7 MG/DL (ref 0.6–1)
DIFFERENTIAL METHOD BLD: ABNORMAL
EOSINOPHIL # BLD: 0.1 K/UL (ref 0–0.8)
EOSINOPHIL NFR BLD: 2 % (ref 0.5–7.8)
ERYTHROCYTE [DISTWIDTH] IN BLOOD BY AUTOMATED COUNT: 14.8 % (ref 11.9–14.6)
GLUCOSE SERPL-MCNC: 110 MG/DL (ref 65–100)
GLUCOSE UR STRIP.AUTO-MCNC: >1000 MG/DL
HCT VFR BLD AUTO: 38.4 % (ref 35.8–46.3)
HDLC SERPL-MCNC: 54 MG/DL (ref 40–60)
HDLC SERPL: 1.8
HGB BLD-MCNC: 11.6 G/DL (ref 11.7–15.4)
HGB UR QL STRIP: ABNORMAL
IMM GRANULOCYTES # BLD AUTO: 0 K/UL (ref 0–0.5)
IMM GRANULOCYTES NFR BLD AUTO: 1 % (ref 0–5)
KETONES UR QL STRIP.AUTO: NEGATIVE MG/DL
LDLC SERPL CALC-MCNC: 17.4 MG/DL
LEUKOCYTE ESTERASE UR QL STRIP.AUTO: ABNORMAL
LYMPHOCYTES # BLD: 1.4 K/UL (ref 0.5–4.6)
LYMPHOCYTES NFR BLD: 22 % (ref 13–44)
MCH RBC QN AUTO: 29 PG (ref 26.1–32.9)
MCHC RBC AUTO-ENTMCNC: 30.2 G/DL (ref 31.4–35)
MCV RBC AUTO: 96 FL (ref 82–102)
MONOCYTES # BLD: 0.6 K/UL (ref 0.1–1.3)
MONOCYTES NFR BLD: 9 % (ref 4–12)
NEUTS SEG # BLD: 4.4 K/UL (ref 1.7–8.2)
NEUTS SEG NFR BLD: 66 % (ref 43–78)
NITRITE UR QL STRIP.AUTO: POSITIVE
NRBC # BLD: 0 K/UL (ref 0–0.2)
PH UR STRIP: 5 (ref 5–9)
PLATELET # BLD AUTO: 162 K/UL (ref 150–450)
PMV BLD AUTO: 11 FL (ref 9.4–12.3)
POTASSIUM SERPL-SCNC: 4.1 MMOL/L (ref 3.5–5.1)
PROT UR STRIP-MCNC: NEGATIVE MG/DL
RBC # BLD AUTO: 4 M/UL (ref 4.05–5.2)
SODIUM SERPL-SCNC: 143 MMOL/L (ref 133–143)
SP GR UR REFRACTOMETRY: 1.03 (ref 1–1.02)
TRIGL SERPL-MCNC: 128 MG/DL (ref 35–150)
UROBILINOGEN UR QL STRIP.AUTO: 0.2 EU/DL (ref 0.2–1)
VLDLC SERPL CALC-MCNC: 25.6 MG/DL (ref 6–23)
WBC # BLD AUTO: 6.6 K/UL (ref 4.3–11.1)

## 2023-10-12 PROCEDURE — 3044F HG A1C LEVEL LT 7.0%: CPT | Performed by: NURSE PRACTITIONER

## 2023-10-12 PROCEDURE — 99214 OFFICE O/P EST MOD 30 MIN: CPT | Performed by: NURSE PRACTITIONER

## 2023-10-12 PROCEDURE — 3078F DIAST BP <80 MM HG: CPT | Performed by: NURSE PRACTITIONER

## 2023-10-12 PROCEDURE — 3074F SYST BP LT 130 MM HG: CPT | Performed by: NURSE PRACTITIONER

## 2023-10-12 RX ORDER — CETIRIZINE HYDROCHLORIDE 10 MG/1
10 TABLET ORAL DAILY
Qty: 30 TABLET | Refills: 0 | Status: CANCELLED | OUTPATIENT
Start: 2023-10-12 | End: 2023-11-11

## 2023-10-12 ASSESSMENT — ENCOUNTER SYMPTOMS
SHORTNESS OF BREATH: 0
RHINORRHEA: 1
GASTROINTESTINAL NEGATIVE: 1
SINUS PAIN: 0
SORE THROAT: 0
SINUS PRESSURE: 0
COUGH: 1
EYE ITCHING: 1
CHEST TIGHTNESS: 0

## 2023-10-12 NOTE — PROGRESS NOTES
10/12/2023 10:02 AM  Location:[address] [place]NAL [place]ICINE  [place]  Patient #:  [id number]  YOB: 1960      History of Present Illness     Chief Complaint   Patient presents with    3 Month Follow-Up     No refills needed. Mammo due    Diabetes    Hypertension       Ms. Shoshana Schneider is a 59 y.o. female  who presents for 3 month follow up DM, HTN. She has a dexacom, her 90 day average is 150. Currently taking metformin, tresiba, jaurdiance, and ozempic. She is being followed by cardiology, currently taking metoprolol 75mg daily and losartan 12.5mg daily. She denies CP, SOB, leg swelling, palpitations, dizziness, headache. She does report having runny nose, cough, difficulty sleeping related to coughing at night. She has chronic allergic rhinitis for the past 30 years, the coughing and runny nose has exacerbated in the last 2 weeks. She works in the yard daily. She states she is taking Claritin and allegra alternatively and Singulair nightly, but feels like they are losing its effectiveness. She denies fever, chills, sore throat. She also reports having urinary frequency and hesitancy for the last 2 weeks, she states she feel like she has to go, then when she sits, nothing comes out. She denies burning, irritation, hematuria, flank pain, discharge.       No Known Allergies     Current Outpatient Medications   Medication Sig Dispense Refill    buPROPion (WELLBUTRIN XL) 300 MG extended release tablet TAKE 1 TABLET BY MOUTH EVERY DAY IN THE MORNING 90 tablet 3    montelukast (SINGULAIR) 10 MG tablet TAKE 1 TABLET BY MOUTH NIGHTLY 90 tablet 3    metoprolol succinate (TOPROL XL) 50 MG extended release tablet Take 1.5 tablets by mouth daily 135 tablet 3    losartan (COZAAR) 25 MG tablet Take 1 tablet by mouth daily Indications: Taking 1/2 a tablet 90 tablet 3    Insulin Degludec (TRESIBA FLEXTOUCH) 100 UNIT/ML SOPN Inject 20 Units into the skin daily Apply sliding scale every 3 days

## 2023-10-13 ENCOUNTER — TELEPHONE (OUTPATIENT)
Dept: INTERNAL MEDICINE CLINIC | Facility: CLINIC | Age: 63
End: 2023-10-13

## 2023-10-13 DIAGNOSIS — N30.90 CYSTITIS: Primary | ICD-10-CM

## 2023-10-13 DIAGNOSIS — D50.9 IRON DEFICIENCY ANEMIA, UNSPECIFIED IRON DEFICIENCY ANEMIA TYPE: ICD-10-CM

## 2023-10-13 RX ORDER — NITROFURANTOIN 25; 75 MG/1; MG/1
100 CAPSULE ORAL 2 TIMES DAILY
Qty: 10 CAPSULE | Refills: 0 | Status: SHIPPED | OUTPATIENT
Start: 2023-10-13 | End: 2023-10-18

## 2023-10-13 NOTE — TELEPHONE ENCOUNTER
I have talked with Ms. Anoop Serna, and have given her and her  this message. I have left the FIT card up front for them to , and I have also scheduled her one month follow up with Cinthia in November.

## 2023-10-13 NOTE — TELEPHONE ENCOUNTER
Ms. Laurent Shoulder-  The labs show that you most likely have a UTI. I have called in an antibiotic and we will follow the culture. The hemoglobin is also down a bit. Please come fill out a FIT test and make sure you are eating a diet high in iron with lots of leafy green vegetables. Take a multivitamin with iron as well. Please let me know if you have dark stool, or any new or concerning symptoms. Hoping you are well! Needs 1 month  check up with me. Thanks!   Bisi

## 2023-10-14 LAB
BACTERIA SPEC CULT: ABNORMAL
SERVICE CMNT-IMP: ABNORMAL

## 2023-10-24 RX ORDER — ROSUVASTATIN CALCIUM 20 MG/1
20 TABLET, COATED ORAL DAILY
Qty: 90 TABLET | Refills: 3 | Status: SHIPPED | OUTPATIENT
Start: 2023-10-24

## 2023-10-30 ENCOUNTER — APPOINTMENT (RX ONLY)
Dept: URBAN - METROPOLITAN AREA CLINIC 329 | Facility: CLINIC | Age: 63
Setting detail: DERMATOLOGY
End: 2023-10-30

## 2023-10-30 DIAGNOSIS — T1490XA CONTUSION OF UNSPECIFIED SITE: ICD-10-CM

## 2023-10-30 DIAGNOSIS — Z12.83 ENCOUNTER FOR SCREENING FOR MALIGNANT NEOPLASM OF SKIN: ICD-10-CM

## 2023-10-30 DIAGNOSIS — L82.1 OTHER SEBORRHEIC KERATOSIS: ICD-10-CM

## 2023-10-30 DIAGNOSIS — L57.8 OTHER SKIN CHANGES DUE TO CHRONIC EXPOSURE TO NONIONIZING RADIATION: ICD-10-CM | Status: STABLE

## 2023-10-30 DIAGNOSIS — Z71.89 OTHER SPECIFIED COUNSELING: ICD-10-CM

## 2023-10-30 DIAGNOSIS — Z80.8 FAMILY HISTORY OF MALIGNANT NEOPLASM OF OTHER ORGANS OR SYSTEMS: ICD-10-CM

## 2023-10-30 PROBLEM — T14.8XXA OTHER INJURY OF UNSPECIFIED BODY REGION, INITIAL ENCOUNTER: Status: ACTIVE | Noted: 2023-10-30

## 2023-10-30 PROCEDURE — ? SUNSCREEN RECOMMENDATIONS

## 2023-10-30 PROCEDURE — ? COUNSELING

## 2023-10-30 PROCEDURE — ? FULL BODY SKIN EXAM - DECLINED

## 2023-10-30 PROCEDURE — ? ADDITIONAL NOTES

## 2023-10-30 PROCEDURE — ? PRESCRIPTION

## 2023-10-30 PROCEDURE — 99203 OFFICE O/P NEW LOW 30 MIN: CPT

## 2023-10-30 RX ORDER — AMMONIUM LACTATE 12 G/100G
LOTION TOPICAL
Qty: 400 | Refills: 6 | Status: ERX | COMMUNITY
Start: 2023-10-30

## 2023-10-30 RX ADMIN — AMMONIUM LACTATE: 12 LOTION TOPICAL at 00:00

## 2023-10-30 ASSESSMENT — LOCATION ZONE DERM
LOCATION ZONE: FEET
LOCATION ZONE: LEG

## 2023-10-30 ASSESSMENT — LOCATION SIMPLE DESCRIPTION DERM
LOCATION SIMPLE: LEFT PRETIBIAL REGION
LOCATION SIMPLE: LEFT FOOT

## 2023-10-30 ASSESSMENT — LOCATION DETAILED DESCRIPTION DERM
LOCATION DETAILED: LEFT PROXIMAL PRETIBIAL REGION
LOCATION DETAILED: LEFT DORSAL FOOT

## 2023-10-30 NOTE — PROCEDURE: ADDITIONAL NOTES
Additional Notes: Reassured patient that the lesions on her foot and leg are benign. Discussed different treatment options like shave removal or topical cream. Patient wants to continue with the topical cream.
Render Risk Assessment In Note?: no
Detail Level: Zone
Additional Notes: Recommended the use of Vitamin K cream to treat the Hematoma.

## 2023-10-30 NOTE — HPI: SKIN LESION
What Type Of Note Output Would You Prefer (Optional)?: Bullet Format
How Severe Is Your Skin Lesion?: mild
Is This A New Presentation, Or A Follow-Up?: Skin Lesions
Which Family Member (Optional)?: Brother
Additional History: Patient states that she has several lesions of concern. The lesions have been there for several years and patient states that she pick on them but the lesions won’t disappear.

## 2023-11-13 ENCOUNTER — OFFICE VISIT (OUTPATIENT)
Dept: INTERNAL MEDICINE CLINIC | Facility: CLINIC | Age: 63
End: 2023-11-13
Payer: COMMERCIAL

## 2023-11-13 VITALS
OXYGEN SATURATION: 98 % | SYSTOLIC BLOOD PRESSURE: 114 MMHG | RESPIRATION RATE: 18 BRPM | TEMPERATURE: 98.3 F | BODY MASS INDEX: 30.22 KG/M2 | HEIGHT: 66 IN | WEIGHT: 188 LBS | DIASTOLIC BLOOD PRESSURE: 72 MMHG | HEART RATE: 81 BPM

## 2023-11-13 DIAGNOSIS — E55.9 VITAMIN D DEFICIENCY: ICD-10-CM

## 2023-11-13 DIAGNOSIS — D50.9 IRON DEFICIENCY ANEMIA, UNSPECIFIED IRON DEFICIENCY ANEMIA TYPE: Primary | ICD-10-CM

## 2023-11-13 DIAGNOSIS — Z87.09 HISTORY OF BRONCHITIS: ICD-10-CM

## 2023-11-13 LAB
BASOPHILS # BLD: 0.1 K/UL (ref 0–0.2)
BASOPHILS NFR BLD: 1 % (ref 0–2)
DIFFERENTIAL METHOD BLD: ABNORMAL
EOSINOPHIL # BLD: 0.2 K/UL (ref 0–0.8)
EOSINOPHIL NFR BLD: 3 % (ref 0.5–7.8)
ERYTHROCYTE [DISTWIDTH] IN BLOOD BY AUTOMATED COUNT: 14.6 % (ref 11.9–14.6)
HCT VFR BLD AUTO: 38.3 % (ref 35.8–46.3)
HGB BLD-MCNC: 11.9 G/DL (ref 11.7–15.4)
IMM GRANULOCYTES # BLD AUTO: 0 K/UL (ref 0–0.5)
IMM GRANULOCYTES NFR BLD AUTO: 0 % (ref 0–5)
LYMPHOCYTES # BLD: 1.2 K/UL (ref 0.5–4.6)
LYMPHOCYTES NFR BLD: 16 % (ref 13–44)
MCH RBC QN AUTO: 28.3 PG (ref 26.1–32.9)
MCHC RBC AUTO-ENTMCNC: 31.1 G/DL (ref 31.4–35)
MCV RBC AUTO: 91 FL (ref 82–102)
MONOCYTES # BLD: 0.6 K/UL (ref 0.1–1.3)
MONOCYTES NFR BLD: 7 % (ref 4–12)
NEUTS SEG # BLD: 5.5 K/UL (ref 1.7–8.2)
NEUTS SEG NFR BLD: 73 % (ref 43–78)
NRBC # BLD: 0 K/UL (ref 0–0.2)
PLATELET # BLD AUTO: 177 K/UL (ref 150–450)
PMV BLD AUTO: 10.5 FL (ref 9.4–12.3)
RBC # BLD AUTO: 4.21 M/UL (ref 4.05–5.2)
WBC # BLD AUTO: 7.6 K/UL (ref 4.3–11.1)

## 2023-11-13 PROCEDURE — 3074F SYST BP LT 130 MM HG: CPT | Performed by: NURSE PRACTITIONER

## 2023-11-13 PROCEDURE — 3078F DIAST BP <80 MM HG: CPT | Performed by: NURSE PRACTITIONER

## 2023-11-13 PROCEDURE — 99213 OFFICE O/P EST LOW 20 MIN: CPT | Performed by: NURSE PRACTITIONER

## 2023-11-13 RX ORDER — METOPROLOL SUCCINATE 100 MG/1
TABLET, EXTENDED RELEASE ORAL
COMMUNITY
Start: 2023-11-12 | End: 2023-11-13

## 2023-11-13 RX ORDER — AMMONIUM LACTATE 12 G/100G
LOTION TOPICAL
COMMUNITY
Start: 2023-11-02

## 2023-11-13 ASSESSMENT — ENCOUNTER SYMPTOMS
COUGH: 1
NAUSEA: 1
CONSTIPATION: 0
SHORTNESS OF BREATH: 0
VOMITING: 0

## 2023-11-13 NOTE — PROGRESS NOTES
reassuring and symptoms are improving. Discussed changing PPI to evening dosing, will continue sleeping with HOB elevated. Knows to call for any worsening symptoms. Appears well and nontoxic today.       Aly Vu, APRN - CNP

## 2023-11-14 LAB
25(OH)D3 SERPL-MCNC: 71.6 NG/ML (ref 30–100)
FERRITIN SERPL-MCNC: 13 NG/ML (ref 8–388)

## 2023-11-15 ENCOUNTER — OFFICE VISIT (OUTPATIENT)
Dept: ORTHOPEDIC SURGERY | Age: 63
End: 2023-11-15
Payer: COMMERCIAL

## 2023-11-15 VITALS — WEIGHT: 188 LBS | BODY MASS INDEX: 30.22 KG/M2 | HEIGHT: 66 IN

## 2023-11-15 DIAGNOSIS — M54.16 LUMBAR RADICULOPATHY: Primary | ICD-10-CM

## 2023-11-15 PROCEDURE — 99214 OFFICE O/P EST MOD 30 MIN: CPT | Performed by: PHYSICIAN ASSISTANT

## 2023-11-15 NOTE — PROGRESS NOTES
11/15/23        Name: Herson Trent  YOB: 1960  Gender: female  MRN: [id number]    CC: Follow-up       HPI: Herson Trent is a 59 y.o. female who returns for Follow-up         Patient returns to the office today for follow-up. I last saw the patient in the office 7/20/2023. She last underwent a bilateral L4-5 and L5-S1 facet joint injection with Dr. Clovis Jaimes on 6/29/2023. She had greater than 80% improvement in pain for approximately 5 months following this injection. She was able to exercise and be more active taking care of her home. Over the last several weeks her pain is gradually returned. Back up to an 8 out of 10. She is having difficulty exercising at the gym and walking for her cardiac health. History was obtained by patient      Meds/PSH/PMH/FH/SH: This information has been reviewed. ALLERGIES: No Known Allergies           Physical Examination:            Imaging:         MRI Result (most recent): MRI LUMBAR SPINE WO CONTRAST 06/12/2023    Narrative  STUDY: MRI LUMBAR SPINE WO CONTRAST [id number]    STUDY DATE: 6/12/2023 9:12 AM    HISTORY: Radiculopathy, lumbar region; Spondylosis without myelopathy or  radiculopathy, lumbar region; Other intervertebral disc degeneration, lumbar  region    COMPARISON:  Radiographs performed May 18, 2023    TECHNIQUE: Multisequence, multiplanar MR images were obtained of the lumbar  spine without the administration of intravenous contrast.      CONTRAST: None. FINDINGS:    SPINAL CORD: Normal morphology. The conus medullaris terminates at the L1  vertebral body level. No focal abnormality of the cauda equina nerve roots. NUMBERING: Last fully formed disc space is designatedL5/S1. BONES: No diffuse marrow signal abnormality. No fracture. No significant bone  marrow edema like signal.    DISCS:  Preserved. ALIGNMENT: No sinuous atelectasis. SOFT TISSUES: The aorta is normal in course and caliber.  No

## 2023-11-29 ENCOUNTER — OFFICE VISIT (OUTPATIENT)
Dept: ORTHOPEDIC SURGERY | Age: 63
End: 2023-11-29
Payer: COMMERCIAL

## 2023-11-29 DIAGNOSIS — M51.36 DDD (DEGENERATIVE DISC DISEASE), LUMBAR: ICD-10-CM

## 2023-11-29 DIAGNOSIS — M47.816 LUMBAR SPONDYLOSIS: ICD-10-CM

## 2023-11-29 DIAGNOSIS — M54.16 LUMBAR RADICULOPATHY: Primary | ICD-10-CM

## 2023-11-29 PROCEDURE — 64494 INJ PARAVERT F JNT L/S 2 LEV: CPT | Performed by: PHYSICAL MEDICINE & REHABILITATION

## 2023-11-29 PROCEDURE — 64493 INJ PARAVERT F JNT L/S 1 LEV: CPT | Performed by: PHYSICAL MEDICINE & REHABILITATION

## 2023-11-29 RX ORDER — TRIAMCINOLONE ACETONIDE 40 MG/ML
40 INJECTION, SUSPENSION INTRA-ARTICULAR; INTRAMUSCULAR ONCE
Status: COMPLETED | OUTPATIENT
Start: 2023-11-29 | End: 2023-11-29

## 2023-11-29 RX ADMIN — TRIAMCINOLONE ACETONIDE 40 MG: 40 INJECTION, SUSPENSION INTRA-ARTICULAR; INTRAMUSCULAR at 09:55

## 2023-11-29 NOTE — PROGRESS NOTES
Name: Marisabel Gaines  YOB: 1960  Gender: female  MRN: [id number]    Procedure: Bilateral  L4-L5 and L5-S1 facet joint injections     Precautions: Marisabel Gaines deniesprior sensitivity to steroid, local anesthetic, iodine, or shellfish. The procedure was discussed at length with her and informed consent was signed and placed in the chart. She was placed in a prone position on the fluoroscopy table and the skin was prepped and draped in a routine sterile fashion. The areas to be injected were each anesthetized with 1% lidocaine. Next, a 25-gauge 3.5 inch spinal needle was carefully advanced under fluoroscopic guidance to the rightL5 - S1 facet joint. Aspiration was negative. Once proper placement was confirmed, 1 ml of 0.25% Marcaine and  0.5 mL 40 mg/mL kenalog were injected through the spinal needle. The above procedure was then repeated through the rightL4 - L5, and left L5 - S1, and leftL4 - L5 facet joints. Fluoroscopic guidance was used intermittently over a 10-minute period to insure proper needle placement and her safety. A hard copy of the fluoroscopic images has been placed in her chart and is saved on the C-arm hard drive. She was monitored for 30 minutes after the procedure and discharged home in a stable fashion with a routine follow up. Procedural Diagnosis:     ICD-10-CM    1. Lumbar radiculopathy  M54.16 FL INJ LUMB/SAC FACET SINGLE LEVEL     XR INJ FACET LUMB SACRAL 2ND LVL     triamcinolone acetonide (KENALOG-40) injection 40 mg      2. Lumbar spondylosis  M47.816 FL INJ LUMB/SAC FACET SINGLE LEVEL     XR INJ FACET LUMB SACRAL 2ND LVL     triamcinolone acetonide (KENALOG-40) injection 40 mg      3.  DDD (degenerative disc disease), lumbar  M51.36 FL INJ LUMB/SAC FACET SINGLE LEVEL     XR INJ FACET LUMB SACRAL 2ND LVL     triamcinolone acetonide (KENALOG-40) injection 40 mg           Griselda Luu MD  11/29/23

## 2023-12-04 DIAGNOSIS — Z12.31 SCREENING MAMMOGRAM FOR BREAST CANCER: ICD-10-CM

## 2023-12-24 DIAGNOSIS — E11.65 TYPE 2 DIABETES MELLITUS WITH HYPERGLYCEMIA, WITH LONG-TERM CURRENT USE OF INSULIN (HCC): ICD-10-CM

## 2023-12-24 DIAGNOSIS — Z79.4 TYPE 2 DIABETES MELLITUS WITH HYPERGLYCEMIA, WITH LONG-TERM CURRENT USE OF INSULIN (HCC): ICD-10-CM

## 2023-12-26 ENCOUNTER — PATIENT MESSAGE (OUTPATIENT)
Dept: INTERNAL MEDICINE CLINIC | Facility: CLINIC | Age: 63
End: 2023-12-26

## 2023-12-26 DIAGNOSIS — E11.65 TYPE 2 DIABETES MELLITUS WITH HYPERGLYCEMIA, WITH LONG-TERM CURRENT USE OF INSULIN (HCC): ICD-10-CM

## 2023-12-26 DIAGNOSIS — Z79.4 TYPE 2 DIABETES MELLITUS WITH HYPERGLYCEMIA, WITH LONG-TERM CURRENT USE OF INSULIN (HCC): ICD-10-CM

## 2023-12-26 RX ORDER — PANTOPRAZOLE SODIUM 40 MG/1
40 TABLET, DELAYED RELEASE ORAL DAILY
Qty: 90 TABLET | Refills: 3 | Status: SHIPPED | OUTPATIENT
Start: 2023-12-26

## 2023-12-26 RX ORDER — EMPAGLIFLOZIN 10 MG/1
10 TABLET, FILM COATED ORAL DAILY
Qty: 90 TABLET | Refills: 3 | Status: SHIPPED | OUTPATIENT
Start: 2023-12-26

## 2023-12-26 NOTE — TELEPHONE ENCOUNTER
From: Thomas Norton  To: Dr. King Momin: 12/26/2023 9:09 AM EST  Subject: UTI and continuous headache     Dr. Mckenzie Amaral     I have had a headache for eight days I have had a bloody nose once in a while. I have continued to take all my sinus medication and Flonase. I also have a UTI so bad I had the shakes and freezing also frequently urination and cloudy   Urina. I had three Nitrifurantoln mono- mcr tables   That I took on Saturday,Sunday and Monday which helped greatly. What can you do to help me.     Thank you  Lola Elise

## 2023-12-26 NOTE — TELEPHONE ENCOUNTER
Appointment 1/23/24, previous NP who prescribed is no longer there she does not see the endocrinology. We do her jardiance medication.

## 2023-12-27 RX ORDER — EMPAGLIFLOZIN 10 MG/1
10 TABLET, FILM COATED ORAL DAILY
Qty: 90 TABLET | Refills: 11 | OUTPATIENT
Start: 2023-12-27

## 2024-01-16 ASSESSMENT — PATIENT HEALTH QUESTIONNAIRE - PHQ9
SUM OF ALL RESPONSES TO PHQ9 QUESTIONS 1 & 2: 0
2. FEELING DOWN, DEPRESSED OR HOPELESS: 0
1. LITTLE INTEREST OR PLEASURE IN DOING THINGS: NOT AT ALL
SUM OF ALL RESPONSES TO PHQ9 QUESTIONS 1 & 2: 0
SUM OF ALL RESPONSES TO PHQ QUESTIONS 1-9: 0
1. LITTLE INTEREST OR PLEASURE IN DOING THINGS: 0
2. FEELING DOWN, DEPRESSED OR HOPELESS: NOT AT ALL

## 2024-01-17 ENCOUNTER — OFFICE VISIT (OUTPATIENT)
Dept: INTERNAL MEDICINE CLINIC | Facility: CLINIC | Age: 64
End: 2024-01-17
Payer: COMMERCIAL

## 2024-01-17 VITALS
BODY MASS INDEX: 30.22 KG/M2 | HEART RATE: 90 BPM | HEIGHT: 66 IN | WEIGHT: 188 LBS | SYSTOLIC BLOOD PRESSURE: 125 MMHG | DIASTOLIC BLOOD PRESSURE: 52 MMHG | OXYGEN SATURATION: 98 %

## 2024-01-17 DIAGNOSIS — N30.90 CYSTITIS: Primary | ICD-10-CM

## 2024-01-17 DIAGNOSIS — R30.9 PAINFUL URINATION: ICD-10-CM

## 2024-01-17 DIAGNOSIS — N39.0 RECURRENT UTI: ICD-10-CM

## 2024-01-17 LAB
APPEARANCE UR: CLEAR
BACTERIA URNS QL MICRO: ABNORMAL /HPF
BILIRUB UR QL: NEGATIVE
COLOR UR: ABNORMAL
EPI CELLS #/AREA URNS HPF: ABNORMAL /HPF
GLUCOSE UR STRIP.AUTO-MCNC: >1000 MG/DL
HGB UR QL STRIP: NEGATIVE
KETONES UR QL STRIP.AUTO: NEGATIVE MG/DL
LEUKOCYTE ESTERASE UR QL STRIP.AUTO: NEGATIVE
NITRITE UR QL STRIP.AUTO: POSITIVE
OTHER OBSERVATIONS: ABNORMAL
PH UR STRIP: 5 (ref 5–9)
PROT UR STRIP-MCNC: NEGATIVE MG/DL
RBC #/AREA URNS HPF: ABNORMAL /HPF
SP GR UR REFRACTOMETRY: 1.03 (ref 1–1.02)
UROBILINOGEN UR QL STRIP.AUTO: 0.2 EU/DL (ref 0.2–1)
WBC URNS QL MICRO: ABNORMAL /HPF

## 2024-01-17 PROCEDURE — 3074F SYST BP LT 130 MM HG: CPT | Performed by: NURSE PRACTITIONER

## 2024-01-17 PROCEDURE — 3078F DIAST BP <80 MM HG: CPT | Performed by: NURSE PRACTITIONER

## 2024-01-17 PROCEDURE — 99213 OFFICE O/P EST LOW 20 MIN: CPT | Performed by: NURSE PRACTITIONER

## 2024-01-17 RX ORDER — ESTRADIOL 0.1 MG/G
2 CREAM VAGINAL SEE ADMIN INSTRUCTIONS
Qty: 42.5 G | Refills: 3 | Status: SHIPPED | OUTPATIENT
Start: 2024-01-17

## 2024-01-17 RX ORDER — CEPHALEXIN 500 MG/1
500 CAPSULE ORAL 2 TIMES DAILY
Qty: 14 CAPSULE | Refills: 0 | Status: SHIPPED | OUTPATIENT
Start: 2024-01-17 | End: 2024-01-24

## 2024-01-17 ASSESSMENT — ENCOUNTER SYMPTOMS
NAUSEA: 0
SHORTNESS OF BREATH: 0
BACK PAIN: 1
VOMITING: 0

## 2024-01-17 NOTE — PROGRESS NOTES
1/17/2024 3:19 PM  Location:[facility] PHYSICIAN SERVICES  [facility] [place] [place]  [place]  Patient #:  [id number]  YOB: 1960          YOUR LAST HEMOGLOBIN A1CS:     Lab Results   Component Value Date/Time    HBA1C 8.0 11/16/2021 12:00 AM    LHJ4KUNN 6.8 12/14/2022 09:56 AM       YOUR LAST LIPID PROFILE:   Lab Results   Component Value Date/Time    CHOL 97 10/12/2023 10:49 AM    HDL 54 10/12/2023 10:49 AM         Lab Results   Component Value Date/Time    GFRAA >60 07/26/2022 02:34 PM    BUN 14 10/12/2023 10:49 AM     10/12/2023 10:49 AM    K 4.1 10/12/2023 10:49 AM     10/12/2023 10:49 AM    CO2 26 10/12/2023 10:49 AM           History of Present Illness     Chief Complaint   Patient presents with    Urinary Tract Infection     Pt had a UTI at the end of Dec [facility] and was started on antibiotic (nitrofurantion) for 5 days. This episode started 2-3 days ago. Pt reports burning, back pain, urgency, frequency       Ms. Richter is a 63 y.o. female  who presents for the above mentioned complaints.  Mrs. Richter presents for 3 days of dysuria, frequency, urgency and dribbling.  She has a history of type 2 diabetes on insulin, complete hysterectomy on oral estrogen.  She reports that she had a recent UTI and went to an urgent care, was put on 5 days of Macrobid which was curative.  Prior to that she had a UTI in October which grew E. coli and was susceptible to Macrobid.  She denies fevers but will occasionally have chills, denies nausea vomiting, hematuria.  Reports her urine is cloudy and has an odor, denies vaginal discharge, lesions, bleeding or yeast symptoms.  She has a history of type 2 diabetes on Jardiance which has not changed recently.  Reports that she has chronic urinary tract infections over the years.    Frequent/Recurrent UTI  This is a recurrent problem. The current episode started in the past 7 days. The problem is unchanged. Associated symptoms include

## 2024-01-19 NOTE — RESULT ENCOUNTER NOTE
Your UTI should have responded to the antibiotics give.  Hope you are feeling better.  Thanks.  [facility]

## 2024-01-20 LAB
BACTERIA SPEC CULT: ABNORMAL
SERVICE CMNT-IMP: ABNORMAL

## 2024-01-23 LAB — DIABETIC RETINOPATHY: NEGATIVE

## 2024-01-24 ENCOUNTER — OFFICE VISIT (OUTPATIENT)
Dept: INTERNAL MEDICINE CLINIC | Facility: CLINIC | Age: 64
End: 2024-01-24
Payer: COMMERCIAL

## 2024-01-24 VITALS
WEIGHT: 184 LBS | HEIGHT: 66 IN | DIASTOLIC BLOOD PRESSURE: 57 MMHG | RESPIRATION RATE: 18 BRPM | SYSTOLIC BLOOD PRESSURE: 113 MMHG | HEART RATE: 87 BPM | BODY MASS INDEX: 29.57 KG/M2

## 2024-01-24 DIAGNOSIS — I15.2 HYPERTENSION ASSOCIATED WITH TYPE 2 DIABETES MELLITUS (HCC): Primary | Chronic | ICD-10-CM

## 2024-01-24 DIAGNOSIS — Z79.4 TYPE 2 DIABETES MELLITUS WITH HYPERGLYCEMIA, WITH LONG-TERM CURRENT USE OF INSULIN (HCC): ICD-10-CM

## 2024-01-24 DIAGNOSIS — E11.59 HYPERTENSION ASSOCIATED WITH TYPE 2 DIABETES MELLITUS (HCC): Primary | Chronic | ICD-10-CM

## 2024-01-24 DIAGNOSIS — E11.65 TYPE 2 DIABETES MELLITUS WITH HYPERGLYCEMIA, WITH LONG-TERM CURRENT USE OF INSULIN (HCC): ICD-10-CM

## 2024-01-24 DIAGNOSIS — D50.9 IRON DEFICIENCY ANEMIA, UNSPECIFIED IRON DEFICIENCY ANEMIA TYPE: ICD-10-CM

## 2024-01-24 LAB
ALBUMIN SERPL-MCNC: 3.8 G/DL (ref 3.2–4.6)
ALBUMIN/GLOB SERPL: 1.3 (ref 0.4–1.6)
ALP SERPL-CCNC: 90 U/L (ref 50–136)
ALT SERPL-CCNC: 24 U/L (ref 12–65)
ANION GAP SERPL CALC-SCNC: 4 MMOL/L (ref 2–11)
AST SERPL-CCNC: 9 U/L (ref 15–37)
BASOPHILS # BLD: 0.1 K/UL (ref 0–0.2)
BASOPHILS NFR BLD: 1 % (ref 0–2)
BILIRUB SERPL-MCNC: 0.3 MG/DL (ref 0.2–1.1)
BUN SERPL-MCNC: 13 MG/DL (ref 8–23)
CALCIUM SERPL-MCNC: 9.2 MG/DL (ref 8.3–10.4)
CHLORIDE SERPL-SCNC: 108 MMOL/L (ref 103–113)
CO2 SERPL-SCNC: 28 MMOL/L (ref 21–32)
CREAT SERPL-MCNC: 0.7 MG/DL (ref 0.6–1)
DIFFERENTIAL METHOD BLD: ABNORMAL
EOSINOPHIL # BLD: 0.2 K/UL (ref 0–0.8)
EOSINOPHIL NFR BLD: 3 % (ref 0.5–7.8)
ERYTHROCYTE [DISTWIDTH] IN BLOOD BY AUTOMATED COUNT: 15.9 % (ref 11.9–14.6)
GLOBULIN SER CALC-MCNC: 2.9 G/DL (ref 2.8–4.5)
GLUCOSE SERPL-MCNC: 149 MG/DL (ref 65–100)
HCT VFR BLD AUTO: 40.7 % (ref 35.8–46.3)
HGB BLD-MCNC: 12.8 G/DL (ref 11.7–15.4)
IMM GRANULOCYTES # BLD AUTO: 0 K/UL (ref 0–0.5)
IMM GRANULOCYTES NFR BLD AUTO: 1 % (ref 0–5)
LYMPHOCYTES # BLD: 1.3 K/UL (ref 0.5–4.6)
LYMPHOCYTES NFR BLD: 19 % (ref 13–44)
MCH RBC QN AUTO: 28.1 PG (ref 26.1–32.9)
MCHC RBC AUTO-ENTMCNC: 31.4 G/DL (ref 31.4–35)
MCV RBC AUTO: 89.5 FL (ref 82–102)
MONOCYTES # BLD: 0.5 K/UL (ref 0.1–1.3)
MONOCYTES NFR BLD: 8 % (ref 4–12)
NEUTS SEG # BLD: 4.8 K/UL (ref 1.7–8.2)
NEUTS SEG NFR BLD: 68 % (ref 43–78)
NRBC # BLD: 0 K/UL (ref 0–0.2)
PLATELET # BLD AUTO: 192 K/UL (ref 150–450)
PMV BLD AUTO: 10.4 FL (ref 9.4–12.3)
POTASSIUM SERPL-SCNC: 4.2 MMOL/L (ref 3.5–5.1)
PROT SERPL-MCNC: 6.7 G/DL (ref 6.3–8.2)
RBC # BLD AUTO: 4.55 M/UL (ref 4.05–5.2)
SODIUM SERPL-SCNC: 140 MMOL/L (ref 136–146)
WBC # BLD AUTO: 6.9 K/UL (ref 4.3–11.1)

## 2024-01-24 PROCEDURE — 3074F SYST BP LT 130 MM HG: CPT | Performed by: INTERNAL MEDICINE

## 2024-01-24 PROCEDURE — 3078F DIAST BP <80 MM HG: CPT | Performed by: INTERNAL MEDICINE

## 2024-01-24 PROCEDURE — 99214 OFFICE O/P EST MOD 30 MIN: CPT | Performed by: INTERNAL MEDICINE

## 2024-01-24 RX ORDER — ACYCLOVIR 400 MG/1
TABLET ORAL
Qty: 9 EACH | Refills: 3 | Status: SHIPPED | OUTPATIENT
Start: 2024-01-24

## 2024-01-24 RX ORDER — SEMAGLUTIDE 2.68 MG/ML
2 INJECTION, SOLUTION SUBCUTANEOUS
Qty: 3 ML | Refills: 11 | Status: SHIPPED | OUTPATIENT
Start: 2024-01-24

## 2024-01-24 RX ORDER — ESTRADIOL 1 MG/1
1 TABLET ORAL DAILY
Qty: 90 TABLET | Refills: 3 | Status: SHIPPED | OUTPATIENT
Start: 2024-01-24 | End: 2025-01-18

## 2024-01-24 RX ORDER — ONDANSETRON 4 MG/1
4 TABLET, ORALLY DISINTEGRATING ORAL EVERY 8 HOURS PRN
Qty: 30 TABLET | Refills: 3 | Status: SHIPPED | OUTPATIENT
Start: 2024-01-24

## 2024-01-24 RX ORDER — METFORMIN HYDROCHLORIDE 500 MG/1
1000 TABLET, EXTENDED RELEASE ORAL 2 TIMES DAILY
Qty: 360 TABLET | Refills: 3 | Status: SHIPPED | OUTPATIENT
Start: 2024-01-24

## 2024-01-24 RX ORDER — DULOXETIN HYDROCHLORIDE 30 MG/1
30 CAPSULE, DELAYED RELEASE ORAL DAILY
Qty: 90 CAPSULE | Refills: 3 | Status: SHIPPED | OUTPATIENT
Start: 2024-01-24

## 2024-01-24 ASSESSMENT — ENCOUNTER SYMPTOMS
COUGH: 0
VOMITING: 0
CONSTIPATION: 0
SHORTNESS OF BREATH: 0
BACK PAIN: 0
DIARRHEA: 0
WHEEZING: 0
NAUSEA: 0
BLOOD IN STOOL: 0

## 2024-01-24 NOTE — PROGRESS NOTES
2024 6:30 PM  Location:[facility] PHYSICIAN SERVICES  [facility] [place]  [place]  Patient #:  [id number]  YOB: 1960            History of Present Illness     Chief Complaint   Patient presents with    6 Month Follow-Up     6 month f/u    Bradycardia     Having episodes of low heart rates - in the 40's per her apple watch.     Frequent/Recurrent UTI     Saw ANGELITO Mathews last week with a UTI    Discuss Medications       Ms. Richter is a 63 y.o. female  who presents for the above.   Is exercising regularly.            No Known Allergies  Past Medical History:   Diagnosis Date    GERD (gastroesophageal reflux disease)     Hyperlipidemia     SIRS (systemic inflammatory response syndrome) (HCC) 2022    Type 2 diabetes mellitus (HCC)     urinary tract infection      Social History     Socioeconomic History    Marital status:      Spouse name: None    Number of children: None    Years of education: None    Highest education level: None   Tobacco Use    Smoking status: Former     Current packs/day: 0.00     Average packs/day: 1 pack/day for 1 year (1.0 ttl pk-yrs)     Types: Cigarettes     Start date: 2020     Quit date: 2021     Years since quittin.6    Smokeless tobacco: Never   Substance and Sexual Activity    Alcohol use: Not Currently    Drug use: Never     Social Determinants of Health     Financial Resource Strain: Low Risk  (2/10/2023)    Overall Financial Resource Strain (CARDIA)     Difficulty of Paying Living Expenses: Not hard at all   Transportation Needs: Unknown (2/10/2023)    PRAPARE - Transportation     Lack of Transportation (Non-Medical): No   Housing Stability: Unknown (2/10/2023)    Housing Stability Vital Sign     Unstable Housing in the Last Year: No     Past Surgical History:   Procedure Laterality Date    APPENDECTOMY      CHOLECYSTECTOMY      HYSTERECTOMY (CERVIX STATUS UNKNOWN)      ovaries removed    HYSTERECTOMY, TOTAL ABDOMINAL (CERVIX REMOVED)

## 2024-01-25 LAB
EST. AVERAGE GLUCOSE BLD GHB EST-MCNC: 146 MG/DL
HBA1C MFR BLD: 6.7 % (ref 4.8–5.6)

## 2024-01-25 NOTE — RESULT ENCOUNTER NOTE
Labs are stable except that your hemoglobin A1c is.  This is still acceptable.  Continue your current doses of medications. Keep up the good work.  Thanks.  [facility]

## 2024-03-05 ENCOUNTER — TELEPHONE (OUTPATIENT)
Age: 64
End: 2024-03-05

## 2024-03-05 NOTE — TELEPHONE ENCOUNTER
Pt reports her apple watch is recording high heart rate readings.   BPM are registering  and high HR are happening when she is at rest. Denies symptoms, but states she usually does not have symptoms with cardiac problems.  VS now: /63 HR 94      Triage will discuss with Dr. Dominguez 3/6/24 when he returns to the office and call pt back with his response.

## 2024-03-06 NOTE — TELEPHONE ENCOUNTER
Lars Dominguez MD Church, Laura D, RN9 minutes ago (9:43 AM)       Yes.  Bring her in for an EKG and if normal, plan an extended Holter with a Zio patch for 7 days. Thanks   Informed pt of Dr. Dominguez' response.   Pt voiced understanding.    has Podiatry appt tomorrow 8a EA  Office next door to [facility]. Appt 9a Nurse Visit. Tomorrow.   Pt voiced understanding. cgh    Monitor order not placed pending EKG results.  cgh

## 2024-03-07 DIAGNOSIS — R00.0 TACHYCARDIA: Primary | ICD-10-CM

## 2024-03-07 PROCEDURE — 93000 ELECTROCARDIOGRAM COMPLETE: CPT | Performed by: INTERNAL MEDICINE

## 2024-03-27 ENCOUNTER — TELEPHONE (OUTPATIENT)
Age: 64
End: 2024-03-27

## 2024-03-28 NOTE — TELEPHONE ENCOUNTER
Per Dr. Dominguez, \"Nothing of concern.  Sinus rhythm with occasional ectopy which would be expected\".    Pt informed of results per Dr. Dominguez. She thanked and voiced understanding.

## 2024-04-01 ENCOUNTER — OFFICE VISIT (OUTPATIENT)
Dept: ORTHOPEDIC SURGERY | Age: 64
End: 2024-04-01
Payer: COMMERCIAL

## 2024-04-01 DIAGNOSIS — M47.816 LUMBAR SPONDYLOSIS: Primary | ICD-10-CM

## 2024-04-01 PROCEDURE — 99213 OFFICE O/P EST LOW 20 MIN: CPT | Performed by: PHYSICIAN ASSISTANT

## 2024-04-01 NOTE — PROGRESS NOTES
superimposed central disc protrusion. No spinal canal or neural foraminal  narrowing.    Impression  Mild multilevel lumbar spondylosis, as detailed above. This is most pronounced  at L4-L5, where there is mild spinal canal stenosis.                  ASSESSMENT AND PLAN:     The patient has stable back pain.  We discussed strategies and options for management of her back without injections for now.  We discussed strategies for using a back brace and my personal recommendations at her request.  Will plan to follow-up the patient in 1 year for annual visit or sooner if needed.  Back pain returns in similar fashion I would recommend repeating lumbar facet injection therapy with Dr. Riddle.

## 2024-05-08 ENCOUNTER — OFFICE VISIT (OUTPATIENT)
Age: 64
End: 2024-05-08
Payer: COMMERCIAL

## 2024-05-08 VITALS
WEIGHT: 182 LBS | DIASTOLIC BLOOD PRESSURE: 76 MMHG | HEIGHT: 66 IN | HEART RATE: 84 BPM | BODY MASS INDEX: 29.25 KG/M2 | SYSTOLIC BLOOD PRESSURE: 122 MMHG

## 2024-05-08 DIAGNOSIS — I10 ESSENTIAL HYPERTENSION: ICD-10-CM

## 2024-05-08 DIAGNOSIS — R07.89 OTHER CHEST PAIN: Primary | ICD-10-CM

## 2024-05-08 DIAGNOSIS — E78.2 MIXED HYPERLIPIDEMIA: ICD-10-CM

## 2024-05-08 DIAGNOSIS — I25.5 ISCHEMIC CARDIOMYOPATHY: ICD-10-CM

## 2024-05-08 PROCEDURE — 93000 ELECTROCARDIOGRAM COMPLETE: CPT | Performed by: INTERNAL MEDICINE

## 2024-05-08 PROCEDURE — 3074F SYST BP LT 130 MM HG: CPT | Performed by: INTERNAL MEDICINE

## 2024-05-08 PROCEDURE — 3078F DIAST BP <80 MM HG: CPT | Performed by: INTERNAL MEDICINE

## 2024-05-08 PROCEDURE — 99214 OFFICE O/P EST MOD 30 MIN: CPT | Performed by: INTERNAL MEDICINE

## 2024-05-08 ASSESSMENT — ENCOUNTER SYMPTOMS
SHORTNESS OF BREATH: 0
ABDOMINAL PAIN: 0
BLOATING: 0
ORTHOPNEA: 0
HEMOPTYSIS: 0
BLURRED VISION: 0
NAUSEA: 0
DOUBLE VISION: 0
COUGH: 0
BACK PAIN: 0
VOMITING: 0

## 2024-05-08 NOTE — PROGRESS NOTES
[facility] CARDIOLOGY  [address] [place] [address], [address]  [place] [address]  PHONE: [phone]    24    NAME:  Manasa Richter  : 1960  MRN: [id number]         SUBJECTIVE:   Manasa Richter is a 63 y.o. female seen for a visit regarding the following:     Chief Complaint   Patient presents with    Coronary Artery Disease       HPI:      History of coronary disease status post PCI to  prox to dLAD from 2021 (~60mm stenting).  Hx of DM (states last A1C ~9 from ), HLP. No prior CVA/TIA.  Coronary angiogram  triggered due to initial echo with severe hypokinesis of the mid to apical septal/apical inferior wall with ejection fraction of around 40% from 2021. Repeat from 10/6/21 at the lower limit of normal with an ejection fraction of around 50%. Moderate  hypokinesis of the apical inferior wall/mild to moderate hypokinesis of the distal septum.  Noted carotid Dopplers done per PCP on 2021 with no significant stenosis.  Stated bradycardia which was asymptomatic per her watch and subsequent extended Holter with sinus rhythm with an average heart rate of 92 [minimum of 72; 3/2024].     Some occasional atypical sharp chest discomfort which is transient and noted at rest.  Goes on an exercise bike 2 miles a day.  No exertional chest discomfort.  States some increased weight gain since she quit smoking.  States she exercises almost daily for about 30 minutes.    Prior-- Denies any chest discomfort/dyspnea on exertion. No prior chest discomfort pre-PCI.  States no significant changes after her intervention.  Prior diarrhea resolved and has reverted back to her Lipitor 40 mg daily but states some issues with leg cramps  in the morning.  Changed over to Crestor 20 mg daily and subsequently attempted a statin vacation with no changes in fatigue.  Back on it.     Has been seeing endocrinology since 2022 with improved diabetes control.  Last A1c from 2022 at 7.0      Prior--initial

## 2024-06-02 SDOH — ECONOMIC STABILITY: FOOD INSECURITY: WITHIN THE PAST 12 MONTHS, YOU WORRIED THAT YOUR FOOD WOULD RUN OUT BEFORE YOU GOT MONEY TO BUY MORE.: PATIENT DECLINED

## 2024-06-02 SDOH — ECONOMIC STABILITY: INCOME INSECURITY: HOW HARD IS IT FOR YOU TO PAY FOR THE VERY BASICS LIKE FOOD, HOUSING, MEDICAL CARE, AND HEATING?: NOT HARD AT ALL

## 2024-06-02 SDOH — ECONOMIC STABILITY: FOOD INSECURITY: WITHIN THE PAST 12 MONTHS, THE FOOD YOU BOUGHT JUST DIDN'T LAST AND YOU DIDN'T HAVE MONEY TO GET MORE.: PATIENT DECLINED

## 2024-06-03 ENCOUNTER — OFFICE VISIT (OUTPATIENT)
Dept: INTERNAL MEDICINE CLINIC | Facility: CLINIC | Age: 64
End: 2024-06-03
Payer: COMMERCIAL

## 2024-06-03 VITALS
SYSTOLIC BLOOD PRESSURE: 107 MMHG | HEART RATE: 78 BPM | RESPIRATION RATE: 18 BRPM | DIASTOLIC BLOOD PRESSURE: 45 MMHG | TEMPERATURE: 97.2 F | OXYGEN SATURATION: 98 % | BODY MASS INDEX: 29.77 KG/M2 | HEIGHT: 66 IN | WEIGHT: 185.2 LBS

## 2024-06-03 DIAGNOSIS — N30.90 CYSTITIS: Primary | ICD-10-CM

## 2024-06-03 LAB
APPEARANCE UR: CLEAR
BILIRUB UR QL: NEGATIVE
COLOR UR: NORMAL
GLUCOSE UR STRIP.AUTO-MCNC: >1000 MG/DL
HGB UR QL STRIP: NEGATIVE
KETONES UR QL STRIP.AUTO: NEGATIVE MG/DL
LEUKOCYTE ESTERASE UR QL STRIP.AUTO: NEGATIVE
NITRITE UR QL STRIP.AUTO: NEGATIVE
PH UR STRIP: 5.5 (ref 5–9)
PROT UR STRIP-MCNC: NEGATIVE MG/DL
SP GR UR REFRACTOMETRY: 1.02 (ref 1–1.02)
UROBILINOGEN UR QL STRIP.AUTO: 0.2 EU/DL (ref 0.2–1)

## 2024-06-03 PROCEDURE — 3074F SYST BP LT 130 MM HG: CPT | Performed by: NURSE PRACTITIONER

## 2024-06-03 PROCEDURE — 99213 OFFICE O/P EST LOW 20 MIN: CPT | Performed by: NURSE PRACTITIONER

## 2024-06-03 PROCEDURE — 3078F DIAST BP <80 MM HG: CPT | Performed by: NURSE PRACTITIONER

## 2024-06-03 RX ORDER — CEPHALEXIN 500 MG/1
500 CAPSULE ORAL 2 TIMES DAILY
Qty: 10 CAPSULE | Refills: 0 | Status: SHIPPED | OUTPATIENT
Start: 2024-06-03 | End: 2024-06-03 | Stop reason: ALTCHOICE

## 2024-06-03 RX ORDER — CEPHALEXIN 500 MG/1
500 CAPSULE ORAL 2 TIMES DAILY
Qty: 14 CAPSULE | Refills: 0 | Status: SHIPPED | OUTPATIENT
Start: 2024-06-03 | End: 2024-06-10

## 2024-06-03 ASSESSMENT — ENCOUNTER SYMPTOMS
DIARRHEA: 0
NAUSEA: 0
VOMITING: 0
CONSTIPATION: 0

## 2024-06-03 NOTE — PROGRESS NOTES
packs/day: 1 pack/day for 1 year (1.0 ttl pk-yrs)     Types: Cigarettes     Start date: 6/1/2020     Quit date: 6/1/2021     Years since quitting: 3.0    Smokeless tobacco: Never   Substance and Sexual Activity    Alcohol use: Not Currently    Drug use: Never     Social Determinants of Health     Financial Resource Strain: Low Risk  (6/2/2024)    Overall Financial Resource Strain (CARDIA)     Difficulty of Paying Living Expenses: Not hard at all   Food Insecurity: Patient Declined (6/2/2024)    Hunger Vital Sign     Worried About Running Out of Food in the Last Year: Patient declined     Ran Out of Food in the Last Year: Patient declined   Transportation Needs: Unknown (6/2/2024)    PRAPARE - Transportation     Lack of Transportation (Non-Medical): No   Housing Stability: Unknown (6/2/2024)    Housing Stability Vital Sign     Unstable Housing in the Last Year: No     Past Surgical History:   Procedure Laterality Date    APPENDECTOMY      CHOLECYSTECTOMY      HYSTERECTOMY (CERVIX STATUS UNKNOWN)      ovaries removed    HYSTERECTOMY, TOTAL ABDOMINAL (CERVIX REMOVED)  2003    PTCA      times 2    TONSILLECTOMY  1974    TUBAL LIGATION  2000     Current Outpatient Medications   Medication Sig Dispense Refill    Continuous Blood Gluc Sensor (DEXCOM G7 SENSOR) MISC Change sensor every 10 days 9 each 3    DULoxetine (CYMBALTA) 30 MG extended release capsule Take 1 capsule by mouth daily 90 capsule 3    estradiol (ESTRACE) 1 MG tablet Take 1 tablet by mouth daily 90 tablet 3    metFORMIN (GLUCOPHAGE-XR) 500 MG extended release tablet Take 2 tablets by mouth 2 times daily 360 tablet 3    OZEMPIC, 2 MG/DOSE, 8 MG/3ML SOPN sc injection Inject 0.75 mLs into the skin every 7 days 3 mL 11    ticagrelor (BRILINTA) 90 MG TABS tablet Take 1 tablet by mouth in the morning and 1 tablet in the evening. 180 tablet 3    estradiol (ESTRACE VAGINAL) 0.1 MG/GM vaginal cream Place 2 g vaginally See Admin Instructions Daily intravaginally for

## 2024-06-05 ENCOUNTER — OFFICE VISIT (OUTPATIENT)
Dept: ORTHOPEDIC SURGERY | Age: 64
End: 2024-06-05
Payer: COMMERCIAL

## 2024-06-05 DIAGNOSIS — M79.672 LEFT FOOT PAIN: ICD-10-CM

## 2024-06-05 DIAGNOSIS — S86.302A PERONEAL TENDON INJURY, LEFT, INITIAL ENCOUNTER: Primary | ICD-10-CM

## 2024-06-05 DIAGNOSIS — Z16.24 MULTIPLE DRUG RESISTANT ORGANISM (MDRO) CULTURE POSITIVE: Primary | ICD-10-CM

## 2024-06-05 DIAGNOSIS — S93.402A SPRAIN OF LEFT ANKLE, UNSPECIFIED LIGAMENT, INITIAL ENCOUNTER: ICD-10-CM

## 2024-06-05 LAB
BACTERIA SPEC CULT: ABNORMAL
BACTERIA SPEC CULT: ABNORMAL
SERVICE CMNT-IMP: ABNORMAL

## 2024-06-05 PROCEDURE — 99214 OFFICE O/P EST MOD 30 MIN: CPT | Performed by: PHYSICIAN ASSISTANT

## 2024-06-05 PROCEDURE — L1902 AFO ANKLE GAUNTLET PRE OTS: HCPCS | Performed by: PHYSICIAN ASSISTANT

## 2024-06-05 NOTE — PROGRESS NOTES
The patient was prescribed a Wraptor brace for the patient's leftfoot. The patient wears a size 8.5 shoe and I fitted the patient with a M brace. I explained how to fit the brace properly by pulling the lace tabs across top of foot first then under arch and lastly pulling the strap up firmly and attaching to the lateral Velcro strip. Thus forming a figure 8 across the ankle joint. Once the figure 8 is completed they are to secure the top (short circumferential) straps to help avoid the straps from loosening with normal wear.  The patient was able to demonstrate proper fitting in office to ensure compliance with device and acknowledged satisfaction with current fit. Patient read and signed documenting they understand and agree to [facility]'s current DME return policy.

## 2024-06-05 NOTE — PROGRESS NOTES
Name: Manasa Richter  YOB: 1960  Gender: female  MRN: [id number]    Treatment Plan:   I had a thorough discussion with the patient regarding the treatment plan   Lateral ankle sprain with instability and peroneal tendon involvement    Patient given ASO brace today-she has a significant history of issues and would not tolerate a boot well  MRI left ankle to evaluate peroneal tendons    Patient understands and in full agreement with the treatment plan.    Weight-bearing status: WBAT        Return to work/work restrictions: none  No medications given    Patient on Brilinta and unable to take NSAIDs    Will call with MRI results     CC: left ankle pain    HPI: Manasa Richter is a 63 y.o. female Presents today with left ankle pain that began after they twisted/rolled it approximately 2 weeks ago.  Patient states that she does not remember any acute event that started this.  She was out working in the yard and she thinks that she twisted her ankle.  She notes significant pain and instability with ambulation.  If she has been up on it at all during the day then she has significant pain and swelling at night and she is unable to walk.  She will also have pain at night that will keep her from sleeping.  They point directly over the lateral ankle into the lateral foot when describing the pain. They describe pain as deep ache and sharp stabbing pain around the ankle.  It is aggravated by standing, at the end of the day, twisting, direct pressure and movement of the ankle.  It is alleviated by rest and ice but the pain doesn't completely leave.  They do not have a history of chronic ankle rolling and sprains.       ROS:   Patient Denies fever/chills, headache, visual changes, chest pain, shortness of breath, and nausea/vomiting/diarrhea     Tobacco:  reports that she quit smoking about 3 years ago. Her smoking use included cigarettes. She started smoking about 4 years ago. She has a 1.0 pack-year

## 2024-06-13 ENCOUNTER — HOSPITAL ENCOUNTER (OUTPATIENT)
Age: 64
Discharge: HOME | End: 2024-06-15
Payer: COMMERCIAL

## 2024-06-13 DIAGNOSIS — M79.672 LEFT FOOT PAIN: ICD-10-CM

## 2024-06-13 PROCEDURE — 73721 MRI JNT OF LWR EXTRE W/O DYE: CPT

## 2024-06-17 ENCOUNTER — TELEPHONE (OUTPATIENT)
Dept: ORTHOPEDIC SURGERY | Age: 64
End: 2024-06-17

## 2024-06-17 NOTE — TELEPHONE ENCOUNTER
Pt would like to know if she should try the ankle boot next. Patient says the wrap is not working for her. Please call

## 2024-06-18 ENCOUNTER — TELEPHONE (OUTPATIENT)
Dept: ORTHOPEDIC SURGERY | Age: 64
End: 2024-06-18

## 2024-06-18 ENCOUNTER — OFFICE VISIT (OUTPATIENT)
Dept: ORTHOPEDIC SURGERY | Age: 64
End: 2024-06-18

## 2024-06-18 DIAGNOSIS — M79.672 LEFT FOOT PAIN: Primary | ICD-10-CM

## 2024-06-18 DIAGNOSIS — S86.302A PERONEAL TENDON INJURY, LEFT, INITIAL ENCOUNTER: ICD-10-CM

## 2024-06-18 DIAGNOSIS — S93.402A SPRAIN OF LEFT ANKLE, UNSPECIFIED LIGAMENT, INITIAL ENCOUNTER: ICD-10-CM

## 2024-06-18 PROCEDURE — M5017 MISC EVENUP: HCPCS | Performed by: PHYSICIAN ASSISTANT

## 2024-06-18 NOTE — TELEPHONE ENCOUNTER
Contacted patient regarding MRI results.  MRI did not reveal any kathie peroneal tendon tearing but did show significant edema within the tendon sheath.  It was explained that approximately 17% of peroneal tendon tears can be missed on MRI.  Patient states that she continues to have significant pain in the ankle and the ASO brace that she was given at her last office visit is not helping.  She would like to try the cam boot.  We had avoided that in the beginning due to patient not tolerating it well due to history of significant back issues.  She will come to the office today and be fitted for CAM boot and even up.  She also be given a order for physical therapy.  She will start physical therapy when she returns home from vacation in [place].  She will follow-up in the office in about 6-8 weeks.  At that time, we will reevaluate her ankle.  If still painful, then we will plan for surgical evaluation of peroneal tendon and possible repair.  Patient understood and was in full agreement with the treatment plan.  She was instructed to contact clinic should she have any questions or concerns.    HARVINDER Cruz

## 2024-06-18 NOTE — TELEPHONE ENCOUNTER
She called yesterday and did not get a return call. She is asking again today to get a call about an issue with her boot.

## 2024-06-18 NOTE — PROGRESS NOTES
The patient was prescribed a walker boot for the patient's left foot. The patient wears a size 8.5 shoe and I fitted them with a M size boot. The patient was fitted and instructed on the use of prescribed walker boot. I explained how to fit themselves and that the plastic flexible piece should always be on the front of the boot and secured by the Velcro straps on top. The air bladder in the boot was adjusted according to proper fit and comfort. The patient walked a short distance and acknowledged satisfaction with current fit. I also explained that they need a heel lift or a higher heeled shoe for the uninvolved LE to help normalize gait and avoid excessive low back stress/strain due to leg length inequality created from walker boot.    The patient was also prescribed and given an even up shoe lift for the right side, size medium. She had on sandals and was not able to wear it out, but voiced understanding on how to wear it with a tennis shoe.     Patient read and signed documenting they understand and agree to [facility]'s current DME return policy.

## 2024-07-22 ENCOUNTER — OFFICE VISIT (OUTPATIENT)
Dept: UROLOGY | Age: 64
End: 2024-07-22
Payer: COMMERCIAL

## 2024-07-22 ENCOUNTER — CLINICAL DOCUMENTATION (OUTPATIENT)
Dept: ORTHOPEDIC SURGERY | Age: 64
End: 2024-07-22

## 2024-07-22 ENCOUNTER — TELEPHONE (OUTPATIENT)
Dept: UROLOGY | Age: 64
End: 2024-07-22

## 2024-07-22 DIAGNOSIS — N95.2 VAGINAL ATROPHY: ICD-10-CM

## 2024-07-22 DIAGNOSIS — N32.81 OAB (OVERACTIVE BLADDER): ICD-10-CM

## 2024-07-22 DIAGNOSIS — N39.41 URINARY INCONTINENCE, URGE: ICD-10-CM

## 2024-07-22 DIAGNOSIS — N39.0 RECURRENT UTI: Primary | ICD-10-CM

## 2024-07-22 LAB
BILIRUBIN, URINE, POC: NEGATIVE
BLOOD URINE, POC: NEGATIVE
GLUCOSE URINE, POC: 500
KETONES, URINE, POC: NEGATIVE
LEUKOCYTE ESTERASE, URINE, POC: NEGATIVE
NITRITE, URINE, POC: POSITIVE
PH, URINE, POC: 6 (ref 4.6–8)
PROTEIN,URINE, POC: NEGATIVE
SPECIFIC GRAVITY, URINE, POC: 1.01 (ref 1–1.03)
URINALYSIS CLARITY, POC: NORMAL
URINALYSIS COLOR, POC: NORMAL
UROBILINOGEN, POC: NORMAL

## 2024-07-22 PROCEDURE — 81003 URINALYSIS AUTO W/O SCOPE: CPT | Performed by: NURSE PRACTITIONER

## 2024-07-22 PROCEDURE — 99204 OFFICE O/P NEW MOD 45 MIN: CPT | Performed by: NURSE PRACTITIONER

## 2024-07-22 ASSESSMENT — ENCOUNTER SYMPTOMS
VOMITING: 0
BLOOD IN STOOL: 0
WHEEZING: 0
HEARTBURN: 0
INDIGESTION: 0
SKIN LESIONS: 0
NAUSEA: 0
BACK PAIN: 0
COUGH: 0
ABDOMINAL PAIN: 0
DIARRHEA: 0
EYE DISCHARGE: 0
CONSTIPATION: 0
SHORTNESS OF BREATH: 0
EYE PAIN: 0

## 2024-07-22 NOTE — PROGRESS NOTES
Patient requested notes and mri to Dr Saenz & Yanira @ [facility] [phone] #[phone].

## 2024-07-22 NOTE — PROGRESS NOTES
[facility]  [address] [address]  [phone]          Manasa Richter  : 1960    Chief Complaint   Patient presents with    New Patient    Urinary Tract Infection          HPI     Manasa Richter is a 63 y.o. female referred for recurrent UTI w MDR. Reports h/o UTIs since age 13. Sign family h/o recurrent UTI.     Urine culture: (6/3/24) grew 10-50K colonies E Coli MDR, (24) grew >100K colonies E Coli, (10/12/24) grew >100K colonies E Coli    . Previous hysterectomy (). Has vaginal atrophy. On estrace cream. Not consistent w this.    Sign h/o diabetes type 2. HA1C down to 6.7. on Jardiance.     Admits to drinking NO water. Mainly drink tea.     Has baseline urinary frequency, urgency, and UUI.     No personal or family h/o urological CA. Former smoker.     Here w her .       Past Medical History:   Diagnosis Date    GERD (gastroesophageal reflux disease)     Hyperlipidemia     SIRS (systemic inflammatory response syndrome) (HCC) 2022    Type 2 diabetes mellitus (HCC)     urinary tract infection      Past Surgical History:   Procedure Laterality Date    APPENDECTOMY      CHOLECYSTECTOMY      HYSTERECTOMY (CERVIX STATUS UNKNOWN)      ovaries removed    HYSTERECTOMY, TOTAL ABDOMINAL (CERVIX REMOVED)      PTCA      times 2    TONSILLECTOMY  1974    TUBAL LIGATION       Current Outpatient Medications   Medication Sig Dispense Refill    Continuous Blood Gluc Sensor (DEXCOM G7 SENSOR) MISC Change sensor every 10 days 9 each 3    DULoxetine (CYMBALTA) 30 MG extended release capsule Take 1 capsule by mouth daily 90 capsule 3    estradiol (ESTRACE) 1 MG tablet Take 1 tablet by mouth daily 90 tablet 3    metFORMIN (GLUCOPHAGE-XR) 500 MG extended release tablet Take 2 tablets by mouth 2 times daily 360 tablet 3    OZEMPIC, 2 MG/DOSE, 8 MG/3ML SOPN sc injection Inject 0.75 mLs into the skin every 7 days 3 mL 11    ticagrelor

## 2024-07-30 ENCOUNTER — HOSPITAL ENCOUNTER (OUTPATIENT)
Dept: ULTRASOUND IMAGING | Age: 64
Discharge: HOME | End: 2024-08-01
Payer: COMMERCIAL

## 2024-07-30 DIAGNOSIS — N39.0 RECURRENT UTI: ICD-10-CM

## 2024-07-30 PROCEDURE — 76770 US EXAM ABDO BACK WALL COMP: CPT

## 2024-08-06 LAB — DIABETIC RETINOPATHY: NEGATIVE

## 2024-08-08 ENCOUNTER — OFFICE VISIT (OUTPATIENT)
Dept: INTERNAL MEDICINE CLINIC | Facility: CLINIC | Age: 64
End: 2024-08-08
Payer: COMMERCIAL

## 2024-08-08 ENCOUNTER — TELEPHONE (OUTPATIENT)
Dept: INTERNAL MEDICINE CLINIC | Facility: CLINIC | Age: 64
End: 2024-08-08

## 2024-08-08 VITALS
HEIGHT: 66 IN | HEART RATE: 84 BPM | BODY MASS INDEX: 29.41 KG/M2 | RESPIRATION RATE: 16 BRPM | DIASTOLIC BLOOD PRESSURE: 59 MMHG | WEIGHT: 183 LBS | SYSTOLIC BLOOD PRESSURE: 113 MMHG

## 2024-08-08 DIAGNOSIS — I15.2 HYPERTENSION ASSOCIATED WITH TYPE 2 DIABETES MELLITUS (HCC): Primary | ICD-10-CM

## 2024-08-08 DIAGNOSIS — E11.59 HYPERTENSION ASSOCIATED WITH TYPE 2 DIABETES MELLITUS (HCC): Primary | ICD-10-CM

## 2024-08-08 DIAGNOSIS — Z79.4 TYPE 2 DIABETES MELLITUS WITH HYPERGLYCEMIA, WITH LONG-TERM CURRENT USE OF INSULIN (HCC): ICD-10-CM

## 2024-08-08 DIAGNOSIS — E11.65 TYPE 2 DIABETES MELLITUS WITH HYPERGLYCEMIA, WITH LONG-TERM CURRENT USE OF INSULIN (HCC): ICD-10-CM

## 2024-08-08 DIAGNOSIS — I25.5 ISCHEMIC CARDIOMYOPATHY: ICD-10-CM

## 2024-08-08 DIAGNOSIS — N39.0 RECURRENT UTI: ICD-10-CM

## 2024-08-08 LAB
ALBUMIN SERPL-MCNC: 3.9 G/DL (ref 3.2–4.6)
ALBUMIN/GLOB SERPL: 1.4 (ref 1–1.9)
ALP SERPL-CCNC: 88 U/L (ref 35–104)
ALT SERPL-CCNC: 25 U/L (ref 12–65)
ANION GAP SERPL CALC-SCNC: 12 MMOL/L (ref 9–18)
AST SERPL-CCNC: 27 U/L (ref 15–37)
BILIRUB SERPL-MCNC: <0.2 MG/DL (ref 0–1.2)
BUN SERPL-MCNC: 16 MG/DL (ref 8–23)
CALCIUM SERPL-MCNC: 9.9 MG/DL (ref 8.8–10.2)
CHLORIDE SERPL-SCNC: 102 MMOL/L (ref 98–107)
CHOLEST SERPL-MCNC: 110 MG/DL (ref 0–200)
CO2 SERPL-SCNC: 27 MMOL/L (ref 20–28)
CREAT SERPL-MCNC: 0.67 MG/DL (ref 0.6–1.1)
EST. AVERAGE GLUCOSE BLD GHB EST-MCNC: 158 MG/DL
GLOBULIN SER CALC-MCNC: 2.7 G/DL (ref 2.3–3.5)
GLUCOSE SERPL-MCNC: 146 MG/DL (ref 70–99)
HBA1C MFR BLD: 7.1 % (ref 0–5.6)
HDLC SERPL-MCNC: 54 MG/DL (ref 40–60)
HDLC SERPL: 2.1 (ref 0–5)
LDLC SERPL CALC-MCNC: 20 MG/DL (ref 0–100)
POTASSIUM SERPL-SCNC: 4.4 MMOL/L (ref 3.5–5.1)
PROT SERPL-MCNC: 6.6 G/DL (ref 6.3–8.2)
SODIUM SERPL-SCNC: 141 MMOL/L (ref 136–145)
TRIGL SERPL-MCNC: 181 MG/DL (ref 0–150)
VLDLC SERPL CALC-MCNC: 36 MG/DL (ref 6–23)

## 2024-08-08 PROCEDURE — 3078F DIAST BP <80 MM HG: CPT | Performed by: INTERNAL MEDICINE

## 2024-08-08 PROCEDURE — 99214 OFFICE O/P EST MOD 30 MIN: CPT | Performed by: INTERNAL MEDICINE

## 2024-08-08 PROCEDURE — 3044F HG A1C LEVEL LT 7.0%: CPT | Performed by: INTERNAL MEDICINE

## 2024-08-08 PROCEDURE — 3074F SYST BP LT 130 MM HG: CPT | Performed by: INTERNAL MEDICINE

## 2024-08-08 RX ORDER — TIRZEPATIDE 2.5 MG/.5ML
2.5 INJECTION, SOLUTION SUBCUTANEOUS WEEKLY
Qty: 2 ML | Refills: 0 | Status: SHIPPED | OUTPATIENT
Start: 2024-08-08

## 2024-08-08 RX ORDER — ROSUVASTATIN CALCIUM 20 MG/1
20 TABLET, COATED ORAL DAILY
Qty: 90 TABLET | Refills: 3 | Status: SHIPPED | OUTPATIENT
Start: 2024-08-08

## 2024-08-08 RX ORDER — MONTELUKAST SODIUM 10 MG/1
10 TABLET ORAL NIGHTLY
Qty: 90 TABLET | Refills: 3 | Status: SHIPPED | OUTPATIENT
Start: 2024-08-08

## 2024-08-08 RX ORDER — LOSARTAN POTASSIUM 25 MG/1
25 TABLET ORAL DAILY
Qty: 90 TABLET | Refills: 3 | Status: SHIPPED | OUTPATIENT
Start: 2024-08-08

## 2024-08-08 RX ORDER — TIRZEPATIDE 5 MG/.5ML
5 INJECTION, SOLUTION SUBCUTANEOUS WEEKLY
Qty: 2 ML | Refills: 5 | Status: SHIPPED | OUTPATIENT
Start: 2024-08-08

## 2024-08-08 RX ORDER — EMPAGLIFLOZIN 10 MG/1
10 TABLET, FILM COATED ORAL DAILY
Qty: 90 TABLET | Refills: 3 | Status: SHIPPED | OUTPATIENT
Start: 2024-08-08

## 2024-08-08 RX ORDER — DULOXETIN HYDROCHLORIDE 30 MG/1
30 CAPSULE, DELAYED RELEASE ORAL DAILY
Qty: 90 CAPSULE | Refills: 3 | Status: SHIPPED | OUTPATIENT
Start: 2024-08-08

## 2024-08-08 RX ORDER — BUPROPION HYDROCHLORIDE 300 MG/1
300 TABLET ORAL EVERY MORNING
Qty: 90 TABLET | Refills: 3 | Status: SHIPPED | OUTPATIENT
Start: 2024-08-08

## 2024-08-08 RX ORDER — INSULIN DEGLUDEC 100 U/ML
20 INJECTION, SOLUTION SUBCUTANEOUS DAILY
Qty: 10 ADJUSTABLE DOSE PRE-FILLED PEN SYRINGE | Refills: 3 | Status: SHIPPED | OUTPATIENT
Start: 2024-08-08

## 2024-08-08 RX ORDER — PANTOPRAZOLE SODIUM 40 MG/1
40 TABLET, DELAYED RELEASE ORAL DAILY
Qty: 90 TABLET | Refills: 3 | Status: SHIPPED | OUTPATIENT
Start: 2024-08-08 | End: 2024-08-08 | Stop reason: SDUPTHER

## 2024-08-08 RX ORDER — ESTRADIOL 1 MG/1
1 TABLET ORAL DAILY
Qty: 90 TABLET | Refills: 3 | Status: SHIPPED | OUTPATIENT
Start: 2024-08-08 | End: 2025-08-03

## 2024-08-08 RX ORDER — ONDANSETRON 4 MG/1
4 TABLET, ORALLY DISINTEGRATING ORAL EVERY 8 HOURS PRN
COMMUNITY
Start: 2024-07-07

## 2024-08-08 RX ORDER — ACYCLOVIR 400 MG/1
TABLET ORAL
Qty: 1 EACH | Refills: 0 | Status: SHIPPED | OUTPATIENT
Start: 2024-08-08

## 2024-08-08 RX ORDER — SEMAGLUTIDE 2.68 MG/ML
2 INJECTION, SOLUTION SUBCUTANEOUS
Qty: 9 ML | Refills: 3 | Status: SHIPPED | OUTPATIENT
Start: 2024-08-08 | End: 2024-08-08 | Stop reason: ALTCHOICE

## 2024-08-08 RX ORDER — PANTOPRAZOLE SODIUM 40 MG/1
40 TABLET, DELAYED RELEASE ORAL DAILY PRN
Qty: 90 TABLET | Refills: 3 | Status: SHIPPED
Start: 2024-08-08

## 2024-08-08 RX ORDER — ESTRADIOL 0.1 MG/G
2 CREAM VAGINAL SEE ADMIN INSTRUCTIONS
Qty: 42.5 G | Refills: 3 | Status: SHIPPED | OUTPATIENT
Start: 2024-08-08

## 2024-08-08 RX ORDER — METFORMIN HYDROCHLORIDE 500 MG/1
1000 TABLET, EXTENDED RELEASE ORAL 2 TIMES DAILY
Qty: 360 TABLET | Refills: 3 | Status: SHIPPED | OUTPATIENT
Start: 2024-08-08

## 2024-08-08 ASSESSMENT — ENCOUNTER SYMPTOMS
VOMITING: 0
COUGH: 0
DIARRHEA: 0
WHEEZING: 0
SHORTNESS OF BREATH: 0
NAUSEA: 0
BLOOD IN STOOL: 0
CONSTIPATION: 0

## 2024-08-08 NOTE — PROGRESS NOTES
Transportation Needs: Unknown (6/2/2024)    PRAPARE - Transportation     Lack of Transportation (Non-Medical): No   Housing Stability: Unknown (6/2/2024)    Housing Stability Vital Sign     Unstable Housing in the Last Year: No     Past Surgical History:   Procedure Laterality Date    APPENDECTOMY      CARDIAC SURGERY  07/01/2021    2 stents    CHOLECYSTECTOMY      HYSTERECTOMY (CERVIX STATUS UNKNOWN)      ovaries removed    HYSTERECTOMY, TOTAL ABDOMINAL (CERVIX REMOVED)  2003    PTCA      times 2    TONSILLECTOMY  1974    TUBAL LIGATION  2000     Current Outpatient Medications   Medication Sig Dispense Refill    ondansetron (ZOFRAN-ODT) 4 MG disintegrating tablet Take 1 tablet by mouth every 8 hours as needed for Nausea or Vomiting      buPROPion (WELLBUTRIN XL) 300 MG extended release tablet Take 1 tablet by mouth every morning 90 tablet 3    Continuous Glucose  (DEXCOM G7 ) SHERINE USE DAILY FOR CONTINOUS BLOOD GLUCOSE MONITORING - ONE  PER YEAR 1 each 0    DULoxetine (CYMBALTA) 30 MG extended release capsule Take 1 capsule by mouth daily 90 capsule 3    estradiol (ESTRACE VAGINAL) 0.1 MG/GM vaginal cream Place 2 g vaginally See Admin Instructions Daily intravaginally for 2 weeks, then twice weekly 42.5 g 3    estradiol (ESTRACE) 1 MG tablet Take 1 tablet by mouth daily 90 tablet 3    Insulin Degludec (TRESIBA FLEXTOUCH) 100 UNIT/ML SOPN Inject 20 Units into the skin daily Apply sliding scale every 3 days for increasing and reducing tresiba (2 unit increase every 3 days for fasting >=140 and decrease for fasting <=100). 10 Adjustable Dose Pre-filled Pen Syringe 3    JARDIANCE 10 MG tablet Take 1 tablet by mouth daily 90 tablet 3    losartan (COZAAR) 25 MG tablet Take 1 tablet by mouth daily Indications: Taking 1/2 a tablet 90 tablet 3    metFORMIN (GLUCOPHAGE-XR) 500 MG extended release tablet Take 2 tablets by mouth 2 times daily 360 tablet 3    montelukast (SINGULAIR) 10 MG tablet Take 1

## 2024-08-08 NOTE — TELEPHONE ENCOUNTER
Pharmacist called with two question on the losartan are they filling for whole tablet daily or half tablet daily? On Monuro two were sent in  2.5mg or 5mg which one to fill ? Confirming 5mg to be filled. When call back calling put in [phone] to reach directly

## 2024-08-12 NOTE — RESULT ENCOUNTER NOTE
Labs are stable except sugars and cholesterol are not as well controlled.  Maintain a low fat high fiber diet and make sure you are getting 30 minutes of aerobic exercise at least 4-5 days weekly.   Otherwise, Continue your current doses of medications. Keep up the good work.  Thanks.  ALEX

## 2024-08-19 ENCOUNTER — TELEPHONE (OUTPATIENT)
Dept: INTERNAL MEDICINE CLINIC | Facility: CLINIC | Age: 64
End: 2024-08-19

## 2024-08-19 NOTE — TELEPHONE ENCOUNTER
----- Message from Dr. Clari Mcpherson MD sent at 8/18/2024  4:28 PM EDT -----  Labs are stable except sugars and cholesterol are not as well controlled.  Maintain a low fat high fiber diet and make sure you are getting 30 minutes of aerobic exercise at least 4-5 days weekly.   Otherwise, Continue your current doses of medications. Keep up the good work.  Thanks.  [facility]

## 2024-09-04 ENCOUNTER — TELEPHONE (OUTPATIENT)
Dept: UROLOGY | Age: 64
End: 2024-09-04

## 2024-09-04 ENCOUNTER — PROCEDURE VISIT (OUTPATIENT)
Dept: UROLOGY | Age: 64
End: 2024-09-04
Payer: COMMERCIAL

## 2024-09-04 ENCOUNTER — PATIENT MESSAGE (OUTPATIENT)
Dept: UROLOGY | Age: 64
End: 2024-09-04

## 2024-09-04 DIAGNOSIS — R82.71 ASYMPTOMATIC BACTERIURIA: ICD-10-CM

## 2024-09-04 DIAGNOSIS — N32.81 OAB (OVERACTIVE BLADDER): Primary | ICD-10-CM

## 2024-09-04 LAB
BILIRUBIN, URINE, POC: NEGATIVE
BLOOD URINE, POC: NEGATIVE
GLUCOSE URINE, POC: 500
KETONES, URINE, POC: NEGATIVE
LEUKOCYTE ESTERASE, URINE, POC: NEGATIVE
NITRITE, URINE, POC: POSITIVE
PH, URINE, POC: 5.5 (ref 4.6–8)
PROTEIN,URINE, POC: NEGATIVE
SPECIFIC GRAVITY, URINE, POC: 1.02 (ref 1–1.03)
URINALYSIS CLARITY, POC: NORMAL
URINALYSIS COLOR, POC: NORMAL
UROBILINOGEN, POC: NORMAL

## 2024-09-04 PROCEDURE — 81003 URINALYSIS AUTO W/O SCOPE: CPT | Performed by: UROLOGY

## 2024-09-04 PROCEDURE — 99214 OFFICE O/P EST MOD 30 MIN: CPT | Performed by: UROLOGY

## 2024-09-04 RX ORDER — NITROFURANTOIN 25; 75 MG/1; MG/1
100 CAPSULE ORAL 2 TIMES DAILY
Qty: 14 CAPSULE | Refills: 0 | Status: SHIPPED | OUTPATIENT
Start: 2024-09-04 | End: 2024-09-11

## 2024-09-06 ENCOUNTER — PATIENT MESSAGE (OUTPATIENT)
Dept: INTERNAL MEDICINE CLINIC | Facility: CLINIC | Age: 64
End: 2024-09-06

## 2024-09-06 DIAGNOSIS — E11.65 TYPE 2 DIABETES MELLITUS WITH HYPERGLYCEMIA, WITH LONG-TERM CURRENT USE OF INSULIN (HCC): Primary | Chronic | ICD-10-CM

## 2024-09-06 DIAGNOSIS — Z79.4 TYPE 2 DIABETES MELLITUS WITH HYPERGLYCEMIA, WITH LONG-TERM CURRENT USE OF INSULIN (HCC): Primary | Chronic | ICD-10-CM

## 2024-09-06 LAB
BACTERIA SPEC CULT: ABNORMAL
BACTERIA SPEC CULT: ABNORMAL
SERVICE CMNT-IMP: ABNORMAL

## 2024-09-06 RX ORDER — TIRZEPATIDE 10 MG/.5ML
10 INJECTION, SOLUTION SUBCUTANEOUS WEEKLY
Qty: 2 ML | Refills: 5 | Status: SHIPPED | OUTPATIENT
Start: 2024-09-06

## 2024-09-06 RX ORDER — SULFAMETHOXAZOLE/TRIMETHOPRIM 800-160 MG
1 TABLET ORAL 2 TIMES DAILY
Qty: 10 TABLET | Refills: 0 | Status: SHIPPED | OUTPATIENT
Start: 2024-09-06 | End: 2024-09-11

## 2024-09-17 ENCOUNTER — PATIENT MESSAGE (OUTPATIENT)
Dept: UROLOGY | Age: 64
End: 2024-09-17

## 2024-09-23 ENCOUNTER — TELEPHONE (OUTPATIENT)
Dept: UROLOGY | Age: 64
End: 2024-09-23

## 2024-10-04 RX ORDER — METOPROLOL SUCCINATE 50 MG/1
75 TABLET, EXTENDED RELEASE ORAL DAILY
Qty: 135 TABLET | Refills: 3 | Status: SHIPPED | OUTPATIENT
Start: 2024-10-04

## 2024-10-04 NOTE — TELEPHONE ENCOUNTER
Verified rx in last OV date 5/8/24. Pharmacy confirmed. Erx as requested.  Requested Prescriptions     Signed Prescriptions Disp Refills    metoprolol succinate (TOPROL XL) 50 MG extended release tablet 135 tablet 3     Sig: Take 1.5 tablets by mouth daily     Authorizing Provider: ZULMA CARVAJAL     Ordering User: DAYRON MARIE

## 2024-10-17 ENCOUNTER — PATIENT MESSAGE (OUTPATIENT)
Dept: UROLOGY | Age: 64
End: 2024-10-17

## 2024-10-18 ENCOUNTER — CLINICAL DOCUMENTATION (OUTPATIENT)
Dept: SURGERY | Age: 64
End: 2024-10-18

## 2024-10-18 NOTE — PROGRESS NOTES
This patient has urge incontinence and is using 1 pad per day.  She is very concerned that starting an anticholinergic will affect her mental status.  I think it highly unlikely that her insurance would pay for Gemtesa and I would add that the patient is already on 24 medications.  I think the most reasonable and safest way to proceed is the use of Botox or InterStim.

## 2024-10-22 ENCOUNTER — NURSE ONLY (OUTPATIENT)
Dept: UROLOGY | Age: 64
End: 2024-10-22
Payer: COMMERCIAL

## 2024-10-22 DIAGNOSIS — N39.0 RECURRENT UTI: Primary | ICD-10-CM

## 2024-10-22 LAB
BILIRUBIN, URINE, POC: NEGATIVE
BLOOD URINE, POC: NEGATIVE
GLUCOSE URINE, POC: 500 MG/DL
KETONES, URINE, POC: NEGATIVE MG/DL
LEUKOCYTE ESTERASE, URINE, POC: NEGATIVE
NITRITE, URINE, POC: NEGATIVE
PH, URINE, POC: 5.5 (ref 4.6–8)
PROTEIN,URINE, POC: NEGATIVE MG/DL
SPECIFIC GRAVITY, URINE, POC: 1 (ref 1–1.03)
URINALYSIS CLARITY, POC: NORMAL
URINALYSIS COLOR, POC: NORMAL
UROBILINOGEN, POC: NORMAL MG/DL

## 2024-10-22 PROCEDURE — 81003 URINALYSIS AUTO W/O SCOPE: CPT | Performed by: NURSE PRACTITIONER

## 2024-10-22 NOTE — PROGRESS NOTES
UA - Dipstick  Results for orders placed or performed in visit on 10/22/24   AMB POC URINALYSIS DIP STICK AUTO W/O MICRO   Result Value Ref Range    Color (UA POC)      Clarity (UA POC)      Glucose, Urine,  Negative mg/dL    Bilirubin, Urine, POC Negative Negative    KETONES, Urine, POC Negative Negative mg/dL    Specific Gravity, Urine, POC 1.005 1.001 - 1.035    Blood (UA POC) Negative     pH, Urine, POC 5.5 4.6 - 8.0    Protein, Urine, POC Negative Negative mg/dL    Urobilinogen, POC 0.2 mg/dL <1.1 mg/dL    Nitrite, Urine, POC Negative Negative    Leukocyte Esterase, Urine, POC Negative Negative       UA - Micro  WBC - 0  RBC - 0  Bacteria - 0  Epith - 0      Requested Prescriptions      No prescriptions requested or ordered in this encounter     Plan    Diagnosis Orders   1. Recurrent UTI  AMB POC URINALYSIS DIP STICK AUTO W/O MICRO          Urine normal. OK to have Botox.    Anjali Arce, APRN - CNP

## 2024-11-04 NOTE — TELEPHONE ENCOUNTER
Verified rx in last OV date 5/8/24. Pharmacy confirmed. Erx as requested.  Requested Prescriptions     Signed Prescriptions Disp Refills    ticagrelor (BRILINTA) 90 MG TABS tablet 180 tablet 3     Sig: Take 1 tablet by mouth in the morning and 1 tablet in the evening.     Authorizing Provider: ZULMA CARVAJAL     Ordering User: DAYRON MARIE

## 2024-11-11 ENCOUNTER — OFFICE VISIT (OUTPATIENT)
Dept: INTERNAL MEDICINE CLINIC | Facility: CLINIC | Age: 64
End: 2024-11-11

## 2024-11-11 VITALS
DIASTOLIC BLOOD PRESSURE: 53 MMHG | BODY MASS INDEX: 29.22 KG/M2 | WEIGHT: 181.8 LBS | HEART RATE: 81 BPM | SYSTOLIC BLOOD PRESSURE: 115 MMHG | RESPIRATION RATE: 18 BRPM | OXYGEN SATURATION: 99 % | HEIGHT: 66 IN | TEMPERATURE: 97.4 F

## 2024-11-11 DIAGNOSIS — R05.9 COUGH, UNSPECIFIED TYPE: ICD-10-CM

## 2024-11-11 DIAGNOSIS — R68.83 CHILLS: ICD-10-CM

## 2024-11-11 DIAGNOSIS — E11.65 TYPE 2 DIABETES MELLITUS WITH HYPERGLYCEMIA, WITH LONG-TERM CURRENT USE OF INSULIN (HCC): Primary | ICD-10-CM

## 2024-11-11 DIAGNOSIS — Z79.4 TYPE 2 DIABETES MELLITUS WITH HYPERGLYCEMIA, WITH LONG-TERM CURRENT USE OF INSULIN (HCC): ICD-10-CM

## 2024-11-11 DIAGNOSIS — E11.65 TYPE 2 DIABETES MELLITUS WITH HYPERGLYCEMIA, WITH LONG-TERM CURRENT USE OF INSULIN (HCC): ICD-10-CM

## 2024-11-11 DIAGNOSIS — J40 BRONCHITIS: ICD-10-CM

## 2024-11-11 DIAGNOSIS — Z79.4 TYPE 2 DIABETES MELLITUS WITH HYPERGLYCEMIA, WITH LONG-TERM CURRENT USE OF INSULIN (HCC): Primary | ICD-10-CM

## 2024-11-11 LAB
BASOPHILS # BLD: 0.1 K/UL (ref 0–0.2)
BASOPHILS NFR BLD: 1 % (ref 0–2)
DIFFERENTIAL METHOD BLD: ABNORMAL
EOSINOPHIL # BLD: 0.2 K/UL (ref 0–0.8)
EOSINOPHIL NFR BLD: 3 % (ref 0.5–7.8)
ERYTHROCYTE [DISTWIDTH] IN BLOOD BY AUTOMATED COUNT: 15.1 % (ref 11.9–14.6)
HCT VFR BLD AUTO: 39.8 % (ref 35.8–46.3)
HGB BLD-MCNC: 12.2 G/DL (ref 11.7–15.4)
IMM GRANULOCYTES # BLD AUTO: 0 K/UL (ref 0–0.5)
IMM GRANULOCYTES NFR BLD AUTO: 1 % (ref 0–5)
INFLUENZA A ANTIGEN, POC: NEGATIVE
INFLUENZA B ANTIGEN, POC: NEGATIVE
LYMPHOCYTES # BLD: 1.1 K/UL (ref 0.5–4.6)
LYMPHOCYTES NFR BLD: 17 % (ref 13–44)
MCH RBC QN AUTO: 28.2 PG (ref 26.1–32.9)
MCHC RBC AUTO-ENTMCNC: 30.7 G/DL (ref 31.4–35)
MCV RBC AUTO: 92.1 FL (ref 82–102)
MONOCYTES # BLD: 0.6 K/UL (ref 0.1–1.3)
MONOCYTES NFR BLD: 9 % (ref 4–12)
NEUTS SEG # BLD: 4.7 K/UL (ref 1.7–8.2)
NEUTS SEG NFR BLD: 70 % (ref 43–78)
NRBC # BLD: 0 K/UL (ref 0–0.2)
PLATELET # BLD AUTO: 140 K/UL (ref 150–450)
PMV BLD AUTO: 11.1 FL (ref 9.4–12.3)
RBC # BLD AUTO: 4.32 M/UL (ref 4.05–5.2)
VALID INTERNAL CONTROL, POC: YES
WBC # BLD AUTO: 6.7 K/UL (ref 4.3–11.1)

## 2024-11-11 RX ORDER — CODEINE PHOSPHATE/GUAIFENESIN 10-100MG/5
5 LIQUID (ML) ORAL 2 TIMES DAILY PRN
Qty: 120 ML | Refills: 0 | Status: SHIPPED | OUTPATIENT
Start: 2024-11-11 | End: 2024-11-23

## 2024-11-11 RX ORDER — TIRZEPATIDE 12.5 MG/.5ML
12.5 INJECTION, SOLUTION SUBCUTANEOUS
Qty: 2 ML | Refills: 3 | Status: SHIPPED | OUTPATIENT
Start: 2024-11-11

## 2024-11-11 RX ORDER — AZITHROMYCIN 250 MG/1
TABLET, FILM COATED ORAL
Qty: 6 TABLET | Refills: 0 | Status: SHIPPED | OUTPATIENT
Start: 2024-11-11 | End: 2024-11-21

## 2024-11-11 RX ORDER — ACYCLOVIR 400 MG/1
TABLET ORAL
OUTPATIENT
Start: 2024-11-11

## 2024-11-11 SDOH — ECONOMIC STABILITY: FOOD INSECURITY: WITHIN THE PAST 12 MONTHS, YOU WORRIED THAT YOUR FOOD WOULD RUN OUT BEFORE YOU GOT MONEY TO BUY MORE.: NEVER TRUE

## 2024-11-11 SDOH — ECONOMIC STABILITY: FOOD INSECURITY: WITHIN THE PAST 12 MONTHS, THE FOOD YOU BOUGHT JUST DIDN'T LAST AND YOU DIDN'T HAVE MONEY TO GET MORE.: NEVER TRUE

## 2024-11-11 SDOH — ECONOMIC STABILITY: INCOME INSECURITY: HOW HARD IS IT FOR YOU TO PAY FOR THE VERY BASICS LIKE FOOD, HOUSING, MEDICAL CARE, AND HEATING?: NOT HARD AT ALL

## 2024-11-11 ASSESSMENT — ENCOUNTER SYMPTOMS
SHORTNESS OF BREATH: 0
TROUBLE SWALLOWING: 0
VOMITING: 0
SORE THROAT: 1
NAUSEA: 1
SINUS PAIN: 0
RHINORRHEA: 0
VOICE CHANGE: 0
WHEEZING: 0
SINUS PRESSURE: 0
COUGH: 1

## 2024-11-11 ASSESSMENT — PATIENT HEALTH QUESTIONNAIRE - PHQ9
SUM OF ALL RESPONSES TO PHQ QUESTIONS 1-9: 0
SUM OF ALL RESPONSES TO PHQ9 QUESTIONS 1 & 2: 0
SUM OF ALL RESPONSES TO PHQ QUESTIONS 1-9: 0
SUM OF ALL RESPONSES TO PHQ QUESTIONS 1-9: 0
1. LITTLE INTEREST OR PLEASURE IN DOING THINGS: NOT AT ALL
2. FEELING DOWN, DEPRESSED OR HOPELESS: NOT AT ALL
SUM OF ALL RESPONSES TO PHQ QUESTIONS 1-9: 0

## 2024-11-11 NOTE — PROGRESS NOTES
11/11/2024 10:19 AM  Location:[facility] PHYSICIAN SERVICES  [facility] MEDICINE  [place]  Patient #:  [id number]  YOB: 1960                History of Present Illness     Chief Complaint   Patient presents with    3 Month Follow-Up     Patient presents in the office today for a 3 month follow up diabetes    Cold Symptoms     Patient c/o cough, chest/nasal congestion, chills, bilateral ear pain, headache, and nausea x 6 days.  Patient reports negative at home Covid test on Friday, believes she may have the flu.       Ms. Richter is a 64 y.o. female  who presents for 3-month follow-up, diabetes.  Ms. Richter presents for diabetic follow-up.  Her A1c in August was 7.1.  She was changed from Ozempic to Mounjaro and is tolerating this well.  Is due for labs.  Would like to go up on Mounjaro for weight loss. Is using CGM. Reports BGLs are controlled.     Notes URI sx x 6 days. Began with sore throat and went to [facility] and tested for strep and was negative. Was given lidocine which didn't help.   Tested for covid which was negative.     Reports she has cough, worse with lying down and reports some thick and green sputum. Is not a smoker, no hx of PNA.   Flonase and salt water gargles not helpful.       Cold Symptoms   Associated symptoms include coughing (green sputum), ear pain, nausea and a sore throat. Pertinent negatives include no chest pain, rhinorrhea, sinus pain, vomiting or wheezing.          No Known Allergies  Past Medical History:   Diagnosis Date    GERD (gastroesophageal reflux disease)     Hyperlipidemia     SIRS (systemic inflammatory response syndrome) ([facility]) 04/17/2022    Type 2 diabetes mellitus (HCC)     urinary tract infection      Social History     Socioeconomic History    Marital status:    Tobacco Use    Smoking status: Former     Current packs/day: 0.00     Average packs/day: 1 pack/day for 15.0 years (15.0 ttl pk-yrs)     Types: Cigarettes     Start date: 6/1/2020     Quit 
Yes

## 2024-11-12 LAB
ANION GAP SERPL CALC-SCNC: 11 MMOL/L (ref 7–16)
BUN SERPL-MCNC: 16 MG/DL (ref 8–23)
CALCIUM SERPL-MCNC: 9.2 MG/DL (ref 8.8–10.2)
CHLORIDE SERPL-SCNC: 104 MMOL/L (ref 98–107)
CO2 SERPL-SCNC: 26 MMOL/L (ref 20–29)
CREAT SERPL-MCNC: 0.65 MG/DL (ref 0.6–1.1)
EST. AVERAGE GLUCOSE BLD GHB EST-MCNC: 139 MG/DL
GLUCOSE SERPL-MCNC: 99 MG/DL (ref 70–99)
HBA1C MFR BLD: 6.5 % (ref 0–5.6)
POTASSIUM SERPL-SCNC: 4.5 MMOL/L (ref 3.5–5.1)
SODIUM SERPL-SCNC: 141 MMOL/L (ref 136–145)

## 2024-11-13 RX ORDER — ONDANSETRON 4 MG/1
4 TABLET, ORALLY DISINTEGRATING ORAL EVERY 8 HOURS PRN
Qty: 18 TABLET | Refills: 6 | Status: SHIPPED | OUTPATIENT
Start: 2024-11-13

## 2024-11-22 ENCOUNTER — PATIENT MESSAGE (OUTPATIENT)
Dept: ORTHOPEDIC SURGERY | Age: 64
End: 2024-11-22

## 2024-11-22 DIAGNOSIS — M47.816 LUMBAR SPONDYLOSIS: Primary | ICD-10-CM

## 2024-11-25 RX ORDER — TOLTERODINE 4 MG/1
4 CAPSULE, EXTENDED RELEASE ORAL DAILY
Qty: 30 CAPSULE | Refills: 3 | Status: SHIPPED | OUTPATIENT
Start: 2024-11-25

## 2024-11-25 NOTE — TELEPHONE ENCOUNTER
Called and spoke with pt. Pt was scheduled for injection on 12/12/24 @ 6217 with JPM at [facility] office. Pt voiced understanding.

## 2024-12-09 LAB — MAMMOGRAPHY, EXTERNAL: NORMAL

## 2024-12-12 ENCOUNTER — OFFICE VISIT (OUTPATIENT)
Dept: ORTHOPEDIC SURGERY | Age: 64
End: 2024-12-12

## 2024-12-12 DIAGNOSIS — M47.816 LUMBAR SPONDYLOSIS: ICD-10-CM

## 2024-12-12 DIAGNOSIS — M54.16 LUMBAR RADICULOPATHY: Primary | ICD-10-CM

## 2024-12-12 RX ORDER — TRIAMCINOLONE ACETONIDE 40 MG/ML
40 INJECTION, SUSPENSION INTRA-ARTICULAR; INTRAMUSCULAR ONCE
Status: COMPLETED | OUTPATIENT
Start: 2024-12-12 | End: 2024-12-12

## 2024-12-12 RX ORDER — PANTOPRAZOLE SODIUM 40 MG/1
40 TABLET, DELAYED RELEASE ORAL DAILY
Qty: 90 TABLET | Refills: 3 | Status: SHIPPED | OUTPATIENT
Start: 2024-12-12

## 2024-12-12 RX ADMIN — TRIAMCINOLONE ACETONIDE 40 MG: 40 INJECTION, SUSPENSION INTRA-ARTICULAR; INTRAMUSCULAR at 12:11

## 2024-12-12 NOTE — PROGRESS NOTES
Name: Manasa Richter  YOB: 1960  Gender: female  MRN: [id number]    Procedure: Bilateral  L4-L5 and L5-S1 facet joint injections     Precautions: Manasa Richter deniesprior sensitivity to steroid, local anesthetic, iodine, or shellfish.     The procedure was discussed at length with the patient and informed consent was signed and placed in the chart. The patient was placed in a prone position on the fluoroscopy table and the skin was prepped and draped in a routine sterile fashion. The areas to be injected were each anesthetized with 1% lidocaine.    Next, a 25-gauge 3.5 inch spinal needle was carefully advanced under fluoroscopic guidance to the rightL5 - S1 facet joint. Aspiration was negative. Once proper placement was confirmed, 1 ml of 0.25% Marcaine and  0.5 mL 40 mg/mL kenalog were injected through the spinal needle.     The above procedure was then repeated through the rightL4 - L5, and left L5 - S1, and leftL4 - L5 facet joints.      Fluoroscopic guidance was used intermittently over a 10-minute period to insure proper needle placement and her safety. A hard copy of the fluoroscopic images has been placed in her chart and is saved on the C-arm hard drive. She was monitored for 30 minutes after the procedure and discharged home in a stable fashion with a routine follow up.     Procedural Diagnosis:     ICD-10-CM    1. Lumbar radiculopathy  M54.16 FL INJ LUMB/SAC FACET SINGLE LEVEL     XR INJ FACET LUMB SACRAL 2ND LVL     triamcinolone acetonide (KENALOG-40) injection 40 mg      2. Lumbar spondylosis  M47.816 FL INJ LUMB/SAC FACET SINGLE LEVEL     XR INJ FACET LUMB SACRAL 2ND LVL     triamcinolone acetonide (KENALOG-40) injection 40 mg           MONIKA YEH MD  12/13/24

## 2024-12-19 DIAGNOSIS — N39.0 RECURRENT UTI: ICD-10-CM

## 2024-12-19 DIAGNOSIS — N39.0 RECURRENT UTI: Primary | ICD-10-CM

## 2024-12-19 LAB
APPEARANCE UR: CLEAR
BILIRUB UR QL: NEGATIVE
COLOR UR: ABNORMAL
GLUCOSE UR STRIP.AUTO-MCNC: >1000 MG/DL
HGB UR QL STRIP: NEGATIVE
KETONES UR QL STRIP.AUTO: ABNORMAL MG/DL
LEUKOCYTE ESTERASE UR QL STRIP.AUTO: NEGATIVE
NITRITE UR QL STRIP.AUTO: NEGATIVE
PH UR STRIP: 5 (ref 5–9)
PROT UR STRIP-MCNC: NEGATIVE MG/DL
SP GR UR REFRACTOMETRY: 1.02 (ref 1–1.02)
UROBILINOGEN UR QL STRIP.AUTO: 0.2 EU/DL (ref 0.2–1)

## 2024-12-21 LAB
BACTERIA SPEC CULT: NORMAL
SERVICE CMNT-IMP: NORMAL

## 2025-02-05 ENCOUNTER — OFFICE VISIT (OUTPATIENT)
Age: 65
End: 2025-02-05
Payer: COMMERCIAL

## 2025-02-05 VITALS
HEART RATE: 82 BPM | BODY MASS INDEX: 29.22 KG/M2 | WEIGHT: 181.8 LBS | DIASTOLIC BLOOD PRESSURE: 60 MMHG | HEIGHT: 66 IN | SYSTOLIC BLOOD PRESSURE: 98 MMHG

## 2025-02-05 DIAGNOSIS — I10 ESSENTIAL HYPERTENSION: ICD-10-CM

## 2025-02-05 DIAGNOSIS — I25.5 ISCHEMIC CARDIOMYOPATHY: Primary | ICD-10-CM

## 2025-02-05 DIAGNOSIS — I25.10 CORONARY ARTERY DISEASE INVOLVING NATIVE CORONARY ARTERY OF NATIVE HEART WITHOUT ANGINA PECTORIS: ICD-10-CM

## 2025-02-05 DIAGNOSIS — E78.2 MIXED HYPERLIPIDEMIA: ICD-10-CM

## 2025-02-05 PROCEDURE — 3078F DIAST BP <80 MM HG: CPT | Performed by: INTERNAL MEDICINE

## 2025-02-05 PROCEDURE — 3074F SYST BP LT 130 MM HG: CPT | Performed by: INTERNAL MEDICINE

## 2025-02-05 PROCEDURE — 99214 OFFICE O/P EST MOD 30 MIN: CPT | Performed by: INTERNAL MEDICINE

## 2025-02-05 RX ORDER — LOSARTAN POTASSIUM 25 MG/1
25 TABLET ORAL DAILY
Qty: 90 TABLET | Refills: 3 | Status: SHIPPED | OUTPATIENT
Start: 2025-02-05

## 2025-02-05 RX ORDER — METOPROLOL SUCCINATE 50 MG/1
50 TABLET, EXTENDED RELEASE ORAL DAILY
Qty: 90 TABLET | Refills: 3 | Status: SHIPPED | OUTPATIENT
Start: 2025-02-05

## 2025-02-05 ASSESSMENT — ENCOUNTER SYMPTOMS
HEMOPTYSIS: 0
NAUSEA: 0
ORTHOPNEA: 0
BLURRED VISION: 0
COUGH: 0
BACK PAIN: 0
ABDOMINAL PAIN: 0
SHORTNESS OF BREATH: 0
BLOATING: 0
DOUBLE VISION: 0
VOMITING: 0

## 2025-02-05 NOTE — PROGRESS NOTES
packs/day: 0.00     Average packs/day: 1 pack/day for 15.0 years (15.0 ttl pk-yrs)     Types: Cigarettes     Start date: 6/1/2020     Quit date: 6/1/2021     Years since quitting: 3.6     Passive exposure: Never    Smokeless tobacco: Never   Substance Use Topics    Alcohol use: Never     Current Outpatient Medications   Medication Sig Dispense Refill    metoprolol succinate (TOPROL XL) 50 MG extended release tablet Take 1 tablet by mouth daily 90 tablet 3    ticagrelor (BRILINTA) 90 MG TABS tablet Take 1 tablet by mouth in the morning and 1 tablet in the evening. 180 tablet 3    losartan (COZAAR) 25 MG tablet Take 1 tablet by mouth daily Indications: Taking 1/2 a tablet 90 tablet 3    pantoprazole (PROTONIX) 40 MG tablet TAKE 1 TABLET BY MOUTH EVERY DAY 90 tablet 3    tolterodine (DETROL LA) 4 MG extended release capsule Take 1 capsule by mouth daily 30 capsule 3    ondansetron (ZOFRAN-ODT) 4 MG disintegrating tablet TAKE 1 TABLET BY MOUTH EVERY 8 HOURS AS NEEDED FOR NAUSEA AND VOMITING 18 tablet 6    Tirzepatide (MOUNJARO) 12.5 MG/0.5ML SOAJ Inject 12.5 mg into the skin every 7 days 2 mL 3    buPROPion (WELLBUTRIN XL) 300 MG extended release tablet Take 1 tablet by mouth every morning 90 tablet 3    Continuous Glucose  (DEXCOM G7 ) SHERINE USE DAILY FOR CONTINOUS BLOOD GLUCOSE MONITORING - ONE  PER YEAR 1 each 0    DULoxetine (CYMBALTA) 30 MG extended release capsule Take 1 capsule by mouth daily 90 capsule 3    estradiol (ESTRACE VAGINAL) 0.1 MG/GM vaginal cream Place 2 g vaginally See Admin Instructions Daily intravaginally for 2 weeks, then twice weekly 42.5 g 3    estradiol (ESTRACE) 1 MG tablet Take 1 tablet by mouth daily 90 tablet 3    Insulin Degludec (TRESIBA FLEXTOUCH) 100 UNIT/ML SOPN Inject 20 Units into the skin daily Apply sliding scale every 3 days for increasing and reducing tresiba (2 unit increase every 3 days for fasting >=140 and decrease for fasting <=100). 10 Adjustable

## 2025-02-21 RX ORDER — TOLTERODINE 4 MG/1
CAPSULE, EXTENDED RELEASE ORAL DAILY
Qty: 90 CAPSULE | Refills: 1 | Status: SHIPPED | OUTPATIENT
Start: 2025-02-21

## 2025-03-01 ASSESSMENT — PATIENT HEALTH QUESTIONNAIRE - PHQ9
SUM OF ALL RESPONSES TO PHQ QUESTIONS 1-9: 0
1. LITTLE INTEREST OR PLEASURE IN DOING THINGS: NOT AT ALL
2. FEELING DOWN, DEPRESSED OR HOPELESS: NOT AT ALL
1. LITTLE INTEREST OR PLEASURE IN DOING THINGS: NOT AT ALL
SUM OF ALL RESPONSES TO PHQ QUESTIONS 1-9: 0
2. FEELING DOWN, DEPRESSED OR HOPELESS: NOT AT ALL
SUM OF ALL RESPONSES TO PHQ QUESTIONS 1-9: 0
SUM OF ALL RESPONSES TO PHQ9 QUESTIONS 1 & 2: 0
SUM OF ALL RESPONSES TO PHQ QUESTIONS 1-9: 0

## 2025-03-04 ENCOUNTER — OFFICE VISIT (OUTPATIENT)
Dept: INTERNAL MEDICINE CLINIC | Facility: CLINIC | Age: 65
End: 2025-03-04
Payer: COMMERCIAL

## 2025-03-04 ENCOUNTER — PATIENT MESSAGE (OUTPATIENT)
Dept: INTERNAL MEDICINE CLINIC | Facility: CLINIC | Age: 65
End: 2025-03-04

## 2025-03-04 VITALS
SYSTOLIC BLOOD PRESSURE: 122 MMHG | HEART RATE: 82 BPM | WEIGHT: 180 LBS | HEIGHT: 66 IN | BODY MASS INDEX: 28.93 KG/M2 | DIASTOLIC BLOOD PRESSURE: 55 MMHG

## 2025-03-04 DIAGNOSIS — K08.89 PAIN, DENTAL: Primary | ICD-10-CM

## 2025-03-04 DIAGNOSIS — Z12.11 SCREEN FOR COLON CANCER: ICD-10-CM

## 2025-03-04 DIAGNOSIS — G89.29 CHRONIC PAIN OF RIGHT KNEE: ICD-10-CM

## 2025-03-04 DIAGNOSIS — E11.69 TYPE 2 DIABETES MELLITUS WITH HYPERLIPIDEMIA (HCC): Primary | Chronic | ICD-10-CM

## 2025-03-04 DIAGNOSIS — R05.2 SUBACUTE COUGH: ICD-10-CM

## 2025-03-04 DIAGNOSIS — E78.5 HYPERLIPIDEMIA, UNSPECIFIED HYPERLIPIDEMIA TYPE: ICD-10-CM

## 2025-03-04 DIAGNOSIS — G89.29 CHRONIC LOW BACK PAIN, UNSPECIFIED BACK PAIN LATERALITY, UNSPECIFIED WHETHER SCIATICA PRESENT: ICD-10-CM

## 2025-03-04 DIAGNOSIS — Z00.00 ROUTINE MEDICAL EXAM: ICD-10-CM

## 2025-03-04 DIAGNOSIS — M54.50 CHRONIC LOW BACK PAIN, UNSPECIFIED BACK PAIN LATERALITY, UNSPECIFIED WHETHER SCIATICA PRESENT: ICD-10-CM

## 2025-03-04 DIAGNOSIS — R80.9 TYPE 2 DIABETES MELLITUS WITH MICROALBUMINURIA, WITH LONG-TERM CURRENT USE OF INSULIN (HCC): Chronic | ICD-10-CM

## 2025-03-04 DIAGNOSIS — E78.5 TYPE 2 DIABETES MELLITUS WITH HYPERLIPIDEMIA (HCC): Primary | Chronic | ICD-10-CM

## 2025-03-04 DIAGNOSIS — I25.5 ISCHEMIC CARDIOMYOPATHY: ICD-10-CM

## 2025-03-04 DIAGNOSIS — I25.10 CAD IN NATIVE ARTERY: ICD-10-CM

## 2025-03-04 DIAGNOSIS — E11.59 HYPERTENSION ASSOCIATED WITH TYPE 2 DIABETES MELLITUS (HCC): Chronic | ICD-10-CM

## 2025-03-04 DIAGNOSIS — E78.5 TYPE 2 DIABETES MELLITUS WITH HYPERLIPIDEMIA (HCC): Chronic | ICD-10-CM

## 2025-03-04 DIAGNOSIS — Z12.31 VISIT FOR SCREENING MAMMOGRAM: ICD-10-CM

## 2025-03-04 DIAGNOSIS — E11.69 TYPE 2 DIABETES MELLITUS WITH HYPERLIPIDEMIA (HCC): Chronic | ICD-10-CM

## 2025-03-04 DIAGNOSIS — M25.561 CHRONIC PAIN OF RIGHT KNEE: ICD-10-CM

## 2025-03-04 DIAGNOSIS — E11.29 TYPE 2 DIABETES MELLITUS WITH MICROALBUMINURIA, WITH LONG-TERM CURRENT USE OF INSULIN (HCC): Chronic | ICD-10-CM

## 2025-03-04 DIAGNOSIS — I15.2 HYPERTENSION ASSOCIATED WITH TYPE 2 DIABETES MELLITUS (HCC): Chronic | ICD-10-CM

## 2025-03-04 DIAGNOSIS — Z79.4 TYPE 2 DIABETES MELLITUS WITH MICROALBUMINURIA, WITH LONG-TERM CURRENT USE OF INSULIN (HCC): Chronic | ICD-10-CM

## 2025-03-04 DIAGNOSIS — I70.0 ARTERIOSCLEROSIS OF ABDOMINAL AORTA: ICD-10-CM

## 2025-03-04 LAB
ALBUMIN SERPL-MCNC: 3.6 G/DL (ref 3.2–4.6)
ALBUMIN/GLOB SERPL: 1.1 (ref 1–1.9)
ALP SERPL-CCNC: 85 U/L (ref 35–104)
ALT SERPL-CCNC: 13 U/L (ref 8–45)
ANION GAP SERPL CALC-SCNC: 11 MMOL/L (ref 7–16)
AST SERPL-CCNC: 19 U/L (ref 15–37)
BILIRUB SERPL-MCNC: <0.2 MG/DL (ref 0–1.2)
BUN SERPL-MCNC: 16 MG/DL (ref 8–23)
CALCIUM SERPL-MCNC: 9.5 MG/DL (ref 8.8–10.2)
CHLORIDE SERPL-SCNC: 106 MMOL/L (ref 98–107)
CHOLEST SERPL-MCNC: 112 MG/DL (ref 0–200)
CO2 SERPL-SCNC: 25 MMOL/L (ref 20–29)
CREAT SERPL-MCNC: 0.61 MG/DL (ref 0.6–1.1)
EST. AVERAGE GLUCOSE BLD GHB EST-MCNC: 144 MG/DL
GLOBULIN SER CALC-MCNC: 3.2 G/DL (ref 2.3–3.5)
GLUCOSE SERPL-MCNC: 133 MG/DL (ref 70–99)
HBA1C MFR BLD: 6.6 % (ref 0–5.6)
HDLC SERPL-MCNC: 53 MG/DL (ref 40–60)
HDLC SERPL: 2.1 (ref 0–5)
LDLC SERPL CALC-MCNC: 35 MG/DL (ref 0–100)
POTASSIUM SERPL-SCNC: 4.2 MMOL/L (ref 3.5–5.1)
PROT SERPL-MCNC: 6.7 G/DL (ref 6.3–8.2)
SODIUM SERPL-SCNC: 142 MMOL/L (ref 136–145)
TRIGL SERPL-MCNC: 122 MG/DL (ref 0–150)
TSH W FREE THYROID IF ABNORMAL: 4.18 UIU/ML (ref 0.27–4.2)
VLDLC SERPL CALC-MCNC: 24 MG/DL (ref 6–23)

## 2025-03-04 PROCEDURE — 99214 OFFICE O/P EST MOD 30 MIN: CPT | Performed by: INTERNAL MEDICINE

## 2025-03-04 PROCEDURE — 3078F DIAST BP <80 MM HG: CPT | Performed by: INTERNAL MEDICINE

## 2025-03-04 PROCEDURE — 3074F SYST BP LT 130 MM HG: CPT | Performed by: INTERNAL MEDICINE

## 2025-03-04 PROCEDURE — 99396 PREV VISIT EST AGE 40-64: CPT | Performed by: INTERNAL MEDICINE

## 2025-03-04 RX ORDER — AMOXICILLIN 500 MG/1
500 TABLET, FILM COATED ORAL 2 TIMES DAILY
COMMUNITY
Start: 2025-01-21 | End: 2025-03-04 | Stop reason: ALTCHOICE

## 2025-03-04 RX ORDER — ACYCLOVIR 400 MG/1
TABLET ORAL
Qty: 9 EACH | Refills: 3 | Status: SHIPPED | OUTPATIENT
Start: 2025-03-04

## 2025-03-04 RX ORDER — HYDROCODONE BITARTRATE AND HOMATROPINE METHYLBROMIDE ORAL SOLUTION 5; 1.5 MG/5ML; MG/5ML
5 LIQUID ORAL EVERY 6 HOURS PRN
Qty: 150 ML | Refills: 0 | Status: SHIPPED | OUTPATIENT
Start: 2025-03-04 | End: 2025-03-12

## 2025-03-04 SDOH — ECONOMIC STABILITY: FOOD INSECURITY: WITHIN THE PAST 12 MONTHS, YOU WORRIED THAT YOUR FOOD WOULD RUN OUT BEFORE YOU GOT MONEY TO BUY MORE.: NEVER TRUE

## 2025-03-04 SDOH — ECONOMIC STABILITY: FOOD INSECURITY: WITHIN THE PAST 12 MONTHS, THE FOOD YOU BOUGHT JUST DIDN'T LAST AND YOU DIDN'T HAVE MONEY TO GET MORE.: NEVER TRUE

## 2025-03-04 ASSESSMENT — ENCOUNTER SYMPTOMS
BLOOD IN STOOL: 0
SHORTNESS OF BREATH: 0
COUGH: 1
CONSTIPATION: 0
SINUS PRESSURE: 0
DIARRHEA: 0
NAUSEA: 0
VOMITING: 0
BACK PAIN: 1
WHEEZING: 0

## 2025-03-04 NOTE — PROGRESS NOTES
12. Chronic pain of right knee        13. Routine medical exam           Wellness information reviewed and appropriate screening addressed.  Is doing fairly well physically and emotionally.  Vitals look good.  Urged her to take her ARB at night to help deter any daytime lows in her BP readings.  Try exercises provided for back and knee.  Call is worse or no better in the next 2 weeks.  Knows to keep a low threshold for contacting the office with worsening symptoms.  Primarily reassurance provided today related to cold feet and hands.  Good circulation noted.  No evidence of Raynauds.  Recommended 30 minutes of aerobic exercise at least 4-5 days weekly.   Diet, exercise and weight loss recommended.   Follow up as documented or earlier as needed.  Polymem bandage provided to place on wound and change out after 3 days.  Wash with warm soapy water before replacing bandage.  Be alert to signs and symptoms of infection and contact the office if they develop.  Antibiotics provided for her continuing dental infection should help with any secondary skin infection.  Advised patient to eat yogurt every day while on antibiotics.        Return in about 6 months (around 9/4/2025) for for regularly scheduled follow up and, 3 month NP.          Clari Mcpherson MD

## 2025-03-04 NOTE — RESULT ENCOUNTER NOTE
Labs are stable.  Continue your current doses of medications. Keep up the good work.  Thanks.  [facility]

## 2025-03-05 RX ORDER — HYDROCODONE BITARTRATE AND ACETAMINOPHEN 5; 325 MG/1; MG/1
1 TABLET ORAL EVERY 4 HOURS PRN
Qty: 18 TABLET | Refills: 0 | Status: SHIPPED | OUTPATIENT
Start: 2025-03-05 | End: 2025-03-08

## 2025-03-25 ENCOUNTER — PATIENT MESSAGE (OUTPATIENT)
Dept: INTERNAL MEDICINE CLINIC | Facility: CLINIC | Age: 65
End: 2025-03-25

## 2025-03-25 DIAGNOSIS — N30.00 ACUTE CYSTITIS WITHOUT HEMATURIA: Primary | ICD-10-CM

## 2025-03-26 DIAGNOSIS — E78.5 TYPE 2 DIABETES MELLITUS WITH HYPERLIPIDEMIA (HCC): Chronic | ICD-10-CM

## 2025-03-26 DIAGNOSIS — E11.69 TYPE 2 DIABETES MELLITUS WITH HYPERLIPIDEMIA (HCC): Chronic | ICD-10-CM

## 2025-03-26 DIAGNOSIS — N30.00 ACUTE CYSTITIS WITHOUT HEMATURIA: ICD-10-CM

## 2025-03-26 LAB
APPEARANCE UR: CLEAR
BILIRUB UR QL: NEGATIVE
COLOR UR: ABNORMAL
CREAT UR-MCNC: 36.4 MG/DL (ref 28–217)
GLUCOSE UR STRIP.AUTO-MCNC: >1000 MG/DL
HGB UR QL STRIP: NEGATIVE
KETONES UR QL STRIP.AUTO: NEGATIVE MG/DL
LEUKOCYTE ESTERASE UR QL STRIP.AUTO: NEGATIVE
MICROALBUMIN UR-MCNC: <1.2 MG/DL (ref 0–20)
MICROALBUMIN/CREAT UR-RTO: NORMAL MG/G (ref 0–30)
NITRITE UR QL STRIP.AUTO: NEGATIVE
PH UR STRIP: 5.5 (ref 5–9)
PROT UR STRIP-MCNC: NEGATIVE MG/DL
SP GR UR REFRACTOMETRY: 1.02 (ref 1–1.02)
UROBILINOGEN UR QL STRIP.AUTO: 0.2 EU/DL (ref 0.2–1)

## 2025-03-27 ENCOUNTER — RESULTS FOLLOW-UP (OUTPATIENT)
Dept: INTERNAL MEDICINE CLINIC | Facility: CLINIC | Age: 65
End: 2025-03-27

## 2025-03-28 LAB
BACTERIA SPEC CULT: NORMAL
SERVICE CMNT-IMP: NORMAL

## 2025-04-01 ENCOUNTER — OFFICE VISIT (OUTPATIENT)
Dept: ORTHOPEDIC SURGERY | Age: 65
End: 2025-04-01
Payer: COMMERCIAL

## 2025-04-01 VITALS — WEIGHT: 180 LBS | HEIGHT: 66 IN | BODY MASS INDEX: 28.93 KG/M2

## 2025-04-01 DIAGNOSIS — M47.816 LUMBAR SPONDYLOSIS: Primary | ICD-10-CM

## 2025-04-01 PROCEDURE — 99214 OFFICE O/P EST MOD 30 MIN: CPT | Performed by: PHYSICIAN ASSISTANT

## 2025-04-01 NOTE — PROGRESS NOTES
Name: Manasa Richter  YOB: 1960  Gender: female  MRN: [id number]         *ANNUAL VISIT *        CC:   Chief Complaint   Patient presents with    Follow-up     Yearly lumbar spine          HPI:   Manasa Richter is a 64 y.o. female with  PMHx below.  There are an established  patient of mine, and present here for Annual Visit.        We have managing this patient's lower back pain with home exercise program and lumbar injection therapy.  Last underwent bilateral L4-5 and L5-S1 facet joint injection with Dr. Riddle 12/12/2024.  She has had greater than 80% improvement in her back pain which continues to today.  She is not currently having severe back pain at this time.  She is able to return to function and activities around the house as tolerated well with these current treatments.  Not feeling significantly limited at this time.  She has continued her physician directed home exercise program since 12/12/2024.  She denies any radicular features.  She takes Tylenol as needed.                Past Medical History Includes:   Past Medical History:   Diagnosis Date    GERD (gastroesophageal reflux disease)     Hyperlipidemia     SIRS (systemic inflammatory response syndrome) (HCC) 04/17/2022    Type 2 diabetes mellitus     urinary tract infection    ,   Past Surgical History:   Procedure Laterality Date    APPENDECTOMY      CARDIAC SURGERY  07/01/2021    2 stents    CHOLECYSTECTOMY      HYSTERECTOMY (CERVIX STATUS UNKNOWN)      ovaries removed    HYSTERECTOMY, TOTAL ABDOMINAL (CERVIX REMOVED)  2003    PTCA      times 2    TONSILLECTOMY  1974    TUBAL LIGATION  2000     Family History:   Family History   Problem Relation Age of Onset    Diabetes Mother     High Blood Pressure Mother     Miscarriages / Stillbirths Mother     COPD Mother     Prostate Cancer Father     Prostate Cancer Brother     Hypertension Brother     Hypertension Brother     Hypertension Brother     Diabetes Maternal Grandfather

## 2025-04-02 DIAGNOSIS — E11.65 TYPE 2 DIABETES MELLITUS WITH HYPERGLYCEMIA, WITH LONG-TERM CURRENT USE OF INSULIN (HCC): ICD-10-CM

## 2025-04-02 DIAGNOSIS — Z79.4 TYPE 2 DIABETES MELLITUS WITH HYPERGLYCEMIA, WITH LONG-TERM CURRENT USE OF INSULIN (HCC): ICD-10-CM

## 2025-04-02 RX ORDER — TIRZEPATIDE 12.5 MG/.5ML
12.5 INJECTION, SOLUTION SUBCUTANEOUS
Qty: 6 ML | Refills: 1 | Status: SHIPPED | OUTPATIENT
Start: 2025-04-02

## 2025-04-12 DIAGNOSIS — N39.0 RECURRENT UTI: ICD-10-CM

## 2025-04-15 RX ORDER — ESTRADIOL 0.1 MG/G
CREAM VAGINAL
Qty: 127.5 G | Refills: 5 | Status: SHIPPED | OUTPATIENT
Start: 2025-04-15

## 2025-04-25 DIAGNOSIS — N39.0 RECURRENT UTI: Primary | ICD-10-CM

## 2025-04-25 DIAGNOSIS — N39.0 RECURRENT UTI: ICD-10-CM

## 2025-04-25 LAB
APPEARANCE UR: ABNORMAL
BACTERIA URNS QL MICRO: ABNORMAL /HPF
BILIRUB UR QL: NEGATIVE
COLOR UR: ABNORMAL
EPI CELLS #/AREA URNS HPF: ABNORMAL /HPF
GLUCOSE UR STRIP.AUTO-MCNC: >1000 MG/DL
HGB UR QL STRIP: NEGATIVE
KETONES UR QL STRIP.AUTO: NEGATIVE MG/DL
LEUKOCYTE ESTERASE UR QL STRIP.AUTO: ABNORMAL
NITRITE UR QL STRIP.AUTO: POSITIVE
OTHER OBSERVATIONS: ABNORMAL
PH UR STRIP: 5.5 (ref 5–9)
PROT UR STRIP-MCNC: NEGATIVE MG/DL
RBC #/AREA URNS HPF: ABNORMAL /HPF
SP GR UR REFRACTOMETRY: 1.02 (ref 1–1.02)
UROBILINOGEN UR QL STRIP.AUTO: 0.2 EU/DL (ref 0.2–1)
WBC URNS QL MICRO: ABNORMAL /HPF

## 2025-04-28 ENCOUNTER — RESULTS FOLLOW-UP (OUTPATIENT)
Dept: UROLOGY | Age: 65
End: 2025-04-28

## 2025-04-28 LAB
BACTERIA SPEC CULT: ABNORMAL
BACTERIA SPEC CULT: ABNORMAL
SERVICE CMNT-IMP: ABNORMAL

## 2025-04-28 RX ORDER — NITROFURANTOIN 25; 75 MG/1; MG/1
100 CAPSULE ORAL 2 TIMES DAILY
Qty: 14 CAPSULE | Refills: 0 | Status: SHIPPED | OUTPATIENT
Start: 2025-04-28 | End: 2025-05-05

## 2025-06-02 ENCOUNTER — TELEPHONE (OUTPATIENT)
Dept: UROLOGY | Age: 65
End: 2025-06-02

## 2025-06-02 ENCOUNTER — OFFICE VISIT (OUTPATIENT)
Dept: UROLOGY | Age: 65
End: 2025-06-02
Payer: COMMERCIAL

## 2025-06-02 DIAGNOSIS — N32.81 OAB (OVERACTIVE BLADDER): ICD-10-CM

## 2025-06-02 DIAGNOSIS — N39.0 RECURRENT UTI: Primary | ICD-10-CM

## 2025-06-02 DIAGNOSIS — N39.41 URINARY INCONTINENCE, URGE: ICD-10-CM

## 2025-06-02 LAB
BILIRUBIN, URINE, POC: NEGATIVE
BLOOD URINE, POC: NEGATIVE
GLUCOSE URINE, POC: 500 MG/DL
KETONES, URINE, POC: NEGATIVE MG/DL
LEUKOCYTE ESTERASE, URINE, POC: NORMAL
NITRITE, URINE, POC: NEGATIVE
PH, URINE, POC: 5.5 (ref 4.6–8)
PROTEIN,URINE, POC: NEGATIVE MG/DL
SPECIFIC GRAVITY, URINE, POC: 1.01 (ref 1–1.03)
URINALYSIS CLARITY, POC: NORMAL
URINALYSIS COLOR, POC: NORMAL
UROBILINOGEN, POC: NORMAL MG/DL

## 2025-06-02 PROCEDURE — 99214 OFFICE O/P EST MOD 30 MIN: CPT | Performed by: NURSE PRACTITIONER

## 2025-06-02 PROCEDURE — 81003 URINALYSIS AUTO W/O SCOPE: CPT | Performed by: NURSE PRACTITIONER

## 2025-06-02 RX ORDER — VIBEGRON 75 MG/1
75 TABLET, FILM COATED ORAL DAILY
Qty: 30 TABLET | Refills: 0
Start: 2025-06-02

## 2025-06-02 ASSESSMENT — ENCOUNTER SYMPTOMS
NAUSEA: 0
BACK PAIN: 0

## 2025-06-02 NOTE — PROGRESS NOTES
Financial Resource Strain (CARDIA)     Difficulty of Paying Living Expenses: Not hard at all   Food Insecurity: No Food Insecurity (3/4/2025)    Hunger Vital Sign     Worried About Running Out of Food in the Last Year: Never true     Ran Out of Food in the Last Year: Never true   Transportation Needs: No Transportation Needs (3/4/2025)    PRAPARE - Transportation     Lack of Transportation (Medical): No     Lack of Transportation (Non-Medical): No   Physical Activity: Not on file   Stress: Not on file   Social Connections: Not on file   Intimate Partner Violence: Not on file   Housing Stability: Low Risk  (3/4/2025)    Housing Stability Vital Sign     Unable to Pay for Housing in the Last Year: No     Number of Times Moved in the Last Year: 0     Homeless in the Last Year: No     Family History   Problem Relation Age of Onset    Diabetes Mother     High Blood Pressure Mother     Miscarriages / Stillbirths Mother     COPD Mother     Prostate Cancer Father     Prostate Cancer Brother     Hypertension Brother     Hypertension Brother     Hypertension Brother     Diabetes Maternal Grandfather     Stroke Paternal Grandfather     Coronary Art Dis Neg Hx        Review of Systems  Constitutional:   Negative for fever.  GI:  Negative for nausea.  Genitourinary: Positive for recurrent UTIs.  Musculoskeletal:  Negative for back pain.      Urinalysis  UA - Dipstick  Results for orders placed or performed in visit on 06/02/25   AMB POC URINALYSIS DIP STICK AUTO W/O MICRO    Collection Time: 06/02/25 10:03 AM   Result Value Ref Range    Color (UA POC)      Clarity (UA POC)      Glucose, Urine,  Negative mg/dL    Bilirubin, Urine, POC Negative Negative    KETONES, Urine, POC Negative Negative mg/dL    Specific Gravity, Urine, POC 1.015 1.001 - 1.035    Blood (UA POC) Negative     pH, Urine, POC 5.5 4.6 - 8.0    Protein, Urine, POC Negative Negative mg/dL    Urobilinogen, POC 0.2 mg/dL <1.1 mg/dL    Nitrite, Urine, POC

## 2025-06-09 ENCOUNTER — RESULTS FOLLOW-UP (OUTPATIENT)
Dept: INTERNAL MEDICINE CLINIC | Facility: CLINIC | Age: 65
End: 2025-06-09

## 2025-06-09 ENCOUNTER — OFFICE VISIT (OUTPATIENT)
Dept: INTERNAL MEDICINE CLINIC | Facility: CLINIC | Age: 65
End: 2025-06-09
Payer: COMMERCIAL

## 2025-06-09 VITALS
SYSTOLIC BLOOD PRESSURE: 108 MMHG | OXYGEN SATURATION: 98 % | TEMPERATURE: 97.3 F | HEIGHT: 66 IN | WEIGHT: 177.6 LBS | DIASTOLIC BLOOD PRESSURE: 55 MMHG | HEART RATE: 89 BPM | BODY MASS INDEX: 28.54 KG/M2 | RESPIRATION RATE: 18 BRPM

## 2025-06-09 DIAGNOSIS — Z79.4 TYPE 2 DIABETES MELLITUS WITH HYPERGLYCEMIA, WITH LONG-TERM CURRENT USE OF INSULIN (HCC): Primary | ICD-10-CM

## 2025-06-09 DIAGNOSIS — Z79.4 TYPE 2 DIABETES MELLITUS WITH HYPERGLYCEMIA, WITH LONG-TERM CURRENT USE OF INSULIN (HCC): ICD-10-CM

## 2025-06-09 DIAGNOSIS — R19.7 DIARRHEA OF PRESUMED INFECTIOUS ORIGIN: ICD-10-CM

## 2025-06-09 DIAGNOSIS — I25.10 CAD IN NATIVE ARTERY: ICD-10-CM

## 2025-06-09 DIAGNOSIS — E11.65 TYPE 2 DIABETES MELLITUS WITH HYPERGLYCEMIA, WITH LONG-TERM CURRENT USE OF INSULIN (HCC): ICD-10-CM

## 2025-06-09 DIAGNOSIS — E11.65 TYPE 2 DIABETES MELLITUS WITH HYPERGLYCEMIA, WITH LONG-TERM CURRENT USE OF INSULIN (HCC): Primary | ICD-10-CM

## 2025-06-09 LAB
ANION GAP SERPL CALC-SCNC: 13 MMOL/L (ref 7–16)
BASOPHILS # BLD: 0.05 K/UL (ref 0–0.2)
BASOPHILS NFR BLD: 0.7 % (ref 0–2)
BUN SERPL-MCNC: 16 MG/DL (ref 8–23)
CALCIUM SERPL-MCNC: 10.2 MG/DL (ref 8.8–10.2)
CHLORIDE SERPL-SCNC: 103 MMOL/L (ref 98–107)
CO2 SERPL-SCNC: 25 MMOL/L (ref 20–29)
CREAT SERPL-MCNC: 0.81 MG/DL (ref 0.6–1.1)
DIFFERENTIAL METHOD BLD: ABNORMAL
EOSINOPHIL # BLD: 0.27 K/UL (ref 0–0.8)
EOSINOPHIL NFR BLD: 3.7 % (ref 0.5–7.8)
ERYTHROCYTE [DISTWIDTH] IN BLOOD BY AUTOMATED COUNT: 14.9 % (ref 11.9–14.6)
EST. AVERAGE GLUCOSE BLD GHB EST-MCNC: 132 MG/DL
GLUCOSE SERPL-MCNC: 200 MG/DL (ref 70–99)
HBA1C MFR BLD: 6.2 % (ref 0–5.6)
HCT VFR BLD AUTO: 40.7 % (ref 35.8–46.3)
HGB BLD-MCNC: 12.8 G/DL (ref 11.7–15.4)
IMM GRANULOCYTES # BLD AUTO: 0.04 K/UL (ref 0–0.5)
IMM GRANULOCYTES NFR BLD AUTO: 0.6 % (ref 0–5)
LYMPHOCYTES # BLD: 1.07 K/UL (ref 0.5–4.6)
LYMPHOCYTES NFR BLD: 14.8 % (ref 13–44)
MCH RBC QN AUTO: 29 PG (ref 26.1–32.9)
MCHC RBC AUTO-ENTMCNC: 31.4 G/DL (ref 31.4–35)
MCV RBC AUTO: 92.1 FL (ref 82–102)
MONOCYTES # BLD: 0.51 K/UL (ref 0.1–1.3)
MONOCYTES NFR BLD: 7 % (ref 4–12)
NEUTS SEG # BLD: 5.31 K/UL (ref 1.7–8.2)
NEUTS SEG NFR BLD: 73.2 % (ref 43–78)
NRBC # BLD: 0 K/UL (ref 0–0.2)
PLATELET # BLD AUTO: 157 K/UL (ref 150–450)
PMV BLD AUTO: 11.3 FL (ref 9.4–12.3)
POTASSIUM SERPL-SCNC: 4.3 MMOL/L (ref 3.5–5.1)
RBC # BLD AUTO: 4.42 M/UL (ref 4.05–5.2)
SODIUM SERPL-SCNC: 141 MMOL/L (ref 136–145)
WBC # BLD AUTO: 7.3 K/UL (ref 4.3–11.1)

## 2025-06-09 PROCEDURE — 3078F DIAST BP <80 MM HG: CPT | Performed by: NURSE PRACTITIONER

## 2025-06-09 PROCEDURE — 99213 OFFICE O/P EST LOW 20 MIN: CPT | Performed by: NURSE PRACTITIONER

## 2025-06-09 PROCEDURE — 3074F SYST BP LT 130 MM HG: CPT | Performed by: NURSE PRACTITIONER

## 2025-06-09 PROCEDURE — 3044F HG A1C LEVEL LT 7.0%: CPT | Performed by: NURSE PRACTITIONER

## 2025-06-09 ASSESSMENT — ENCOUNTER SYMPTOMS
DIARRHEA: 1
NAUSEA: 0
VOMITING: 0

## 2025-06-09 NOTE — PROGRESS NOTES
6/9/2025 10:26 AM  Location:[facility] PHYSICIAN SERVICES  [facility]  [place]  Patient #:  [id number]  YOB: 1960    Reason for Visit  3 Month Follow-Up Patient presents in the office today for a 3 month follow up on diabetes      History of Present Illness  The patient is a 64-year-old female who presents for a 3-month follow-up on diabetes. She has a history of type 2 diabetes with a last A1c of 6.6, which was done on 03/04/2025. She currently is on insulin Tresiba 20 units nightly, Jardiance 10 mg daily, metformin 1000 mg twice daily as well as Mounjaro 12.5 mg weekly. She does have a CGM.    Her last consultation with Dr. Mcpherson was in 03/2025, during which she was advised to schedule follow-up visits every 3 months with this provider and every 6 months with Dr. Mcpherson. She has expressed a desire to increase her Mounjaro dosage to 15 mg, citing weight loss as the primary motivation. Despite this, she has not experienced any significant weight loss. Her fasting blood glucose levels typically range between 90 and 100, occasionally spiking to 120 or dropping to 80. There was one instance where her blood glucose level dropped below 70. She recalls an episode where her continuous glucose monitor (CGM) alerted her to a low blood glucose level of 45 during the night, but she did not consume any orange juice and reported feeling well. She has been on a regimen of Tresiba 12 units for an extended period, having gradually reduced the dosage from 20 units. She recently missed a dose of Tresiba due to travel, resulting in elevated blood glucose levels between 140 and 160 the following morning. However, her blood glucose levels returned to normal after resuming the 12-unit dosage. She has previously tried Trulicity, but it caused severe side effects including weight loss and inability to consume anything other than ginger ale. She is currently attending exercise classes as recommended by

## 2025-06-10 RX ORDER — VIBEGRON 75 MG/1
75 TABLET, FILM COATED ORAL DAILY
Qty: 30 TABLET | Refills: 0 | OUTPATIENT
Start: 2025-06-10

## 2025-07-14 RX ORDER — BUPROPION HYDROCHLORIDE 300 MG/1
300 TABLET ORAL EVERY MORNING
Qty: 90 TABLET | Refills: 3 | Status: SHIPPED | OUTPATIENT
Start: 2025-07-14

## 2025-07-21 DIAGNOSIS — N39.0 RECURRENT UTI: ICD-10-CM

## 2025-07-21 DIAGNOSIS — N39.0 RECURRENT UTI: Primary | ICD-10-CM

## 2025-07-21 LAB
APPEARANCE UR: ABNORMAL
BACTERIA URNS QL MICRO: ABNORMAL /HPF
BILIRUB UR QL: NEGATIVE
COLOR UR: ABNORMAL
EPI CELLS #/AREA URNS HPF: ABNORMAL /HPF
GLUCOSE UR STRIP.AUTO-MCNC: >1000 MG/DL
HGB UR QL STRIP: NEGATIVE
KETONES UR QL STRIP.AUTO: ABNORMAL MG/DL
LEUKOCYTE ESTERASE UR QL STRIP.AUTO: ABNORMAL
NITRITE UR QL STRIP.AUTO: POSITIVE
OTHER OBSERVATIONS: ABNORMAL
PH UR STRIP: 5.5 (ref 5–9)
PROT UR STRIP-MCNC: NEGATIVE MG/DL
SP GR UR REFRACTOMETRY: 1.03 (ref 1–1.02)
UROBILINOGEN UR QL STRIP.AUTO: 1 EU/DL (ref 0.2–1)
WBC URNS QL MICRO: ABNORMAL /HPF
YEAST URNS QL MICRO: ABNORMAL

## 2025-07-23 LAB
BACTERIA SPEC CULT: ABNORMAL
SERVICE CMNT-IMP: ABNORMAL

## 2025-07-29 ENCOUNTER — CLINICAL SUPPORT (OUTPATIENT)
Dept: UROLOGY | Age: 65
End: 2025-07-29
Payer: COMMERCIAL

## 2025-07-29 DIAGNOSIS — N39.0 RECURRENT UTI: ICD-10-CM

## 2025-07-29 DIAGNOSIS — N39.0 RECURRENT UTI: Primary | ICD-10-CM

## 2025-07-29 LAB
BILIRUBIN, URINE, POC: NEGATIVE
BLOOD URINE, POC: NORMAL
GLUCOSE URINE, POC: 500 MG/DL
KETONES, URINE, POC: NORMAL MG/DL
LEUKOCYTE ESTERASE, URINE, POC: NORMAL
NITRITE, URINE, POC: NEGATIVE
PH, URINE, POC: 5.5 (ref 4.6–8)
PROTEIN,URINE, POC: NEGATIVE MG/DL
SPECIFIC GRAVITY, URINE, POC: 1.01 (ref 1–1.03)
URINALYSIS CLARITY, POC: NORMAL
URINALYSIS COLOR, POC: NORMAL
UROBILINOGEN, POC: NORMAL MG/DL

## 2025-07-29 PROCEDURE — 81003 URINALYSIS AUTO W/O SCOPE: CPT | Performed by: NURSE PRACTITIONER

## 2025-07-29 RX ORDER — GRANULES FOR ORAL 3 G/1
3 POWDER ORAL ONCE
Qty: 1 EACH | Refills: 0 | Status: SHIPPED | OUTPATIENT
Start: 2025-07-29 | End: 2025-07-29

## 2025-07-29 NOTE — PROGRESS NOTES
UA - Dipstick  Results for orders placed or performed in visit on 07/29/25   AMB POC URINALYSIS DIP STICK AUTO W/O MICRO    Collection Time: 07/29/25 11:34 AM   Result Value Ref Range    Color (UA POC)      Clarity (UA POC)      Glucose, Urine,  Negative mg/dL    Bilirubin, Urine, POC Negative Negative    KETONES, Urine, POC Trace Negative mg/dL    Specific Gravity, Urine, POC 1.015 1.001 - 1.035    Blood (UA POC) Trace-intact     pH, Urine, POC 5.5 4.6 - 8.0    Protein, Urine, POC Negative Negative mg/dL    Urobilinogen, POC 0.2 mg/dL <1.1 mg/dL    Nitrite, Urine, POC Negative Negative    Leukocyte Esterase, Urine, POC Small Negative           Requested Prescriptions      No prescriptions requested or ordered in this encounter     Plan    Diagnosis Orders   1. Recurrent UTI  AMB POC URINALYSIS DIP STICK AUTO W/O MICRO    Culture, Urine          Fosfomycin 3 gm PO once. Await repeat culture.    Anjali Arce, APRN - CNP

## 2025-07-31 LAB
BACTERIA SPEC CULT: NORMAL
SERVICE CMNT-IMP: NORMAL

## 2025-08-01 ENCOUNTER — TELEPHONE (OUTPATIENT)
Age: 65
End: 2025-08-01

## 2025-08-01 NOTE — TELEPHONE ENCOUNTER
Requested Prescriptions     Pending Prescriptions Disp Refills    BRILINTA 90 MG TABS tablet 180 tablet 3     Sig: Take 1 tablet by mouth 2 times daily         Verified rx. Last OV 2/5/25. Erx to pharm on file.

## 2025-08-03 RX ORDER — TICAGRELOR 90 MG/1
90 TABLET ORAL 2 TIMES DAILY
Qty: 180 TABLET | Refills: 3 | Status: SHIPPED | OUTPATIENT
Start: 2025-08-03

## 2025-08-13 ENCOUNTER — OFFICE VISIT (OUTPATIENT)
Age: 65
End: 2025-08-13
Payer: COMMERCIAL

## 2025-08-13 VITALS
HEART RATE: 87 BPM | DIASTOLIC BLOOD PRESSURE: 62 MMHG | WEIGHT: 177 LBS | SYSTOLIC BLOOD PRESSURE: 112 MMHG | HEIGHT: 66 IN | BODY MASS INDEX: 28.45 KG/M2

## 2025-08-13 DIAGNOSIS — E78.2 MIXED HYPERLIPIDEMIA: ICD-10-CM

## 2025-08-13 DIAGNOSIS — I25.5 ISCHEMIC CARDIOMYOPATHY: ICD-10-CM

## 2025-08-13 DIAGNOSIS — I10 ESSENTIAL HYPERTENSION: ICD-10-CM

## 2025-08-13 DIAGNOSIS — I25.10 CORONARY ARTERY DISEASE INVOLVING NATIVE CORONARY ARTERY OF NATIVE HEART WITHOUT ANGINA PECTORIS: Primary | ICD-10-CM

## 2025-08-13 DIAGNOSIS — Z01.810 PREOP CARDIOVASCULAR EXAM: ICD-10-CM

## 2025-08-13 PROCEDURE — 93000 ELECTROCARDIOGRAM COMPLETE: CPT | Performed by: INTERNAL MEDICINE

## 2025-08-13 PROCEDURE — 3078F DIAST BP <80 MM HG: CPT | Performed by: INTERNAL MEDICINE

## 2025-08-13 PROCEDURE — 99214 OFFICE O/P EST MOD 30 MIN: CPT | Performed by: INTERNAL MEDICINE

## 2025-08-13 PROCEDURE — 3074F SYST BP LT 130 MM HG: CPT | Performed by: INTERNAL MEDICINE

## 2025-08-13 ASSESSMENT — ENCOUNTER SYMPTOMS
SHORTNESS OF BREATH: 0
ABDOMINAL PAIN: 0
DOUBLE VISION: 0
BACK PAIN: 0
NAUSEA: 0
COUGH: 0
HEMOPTYSIS: 0
BLURRED VISION: 0
VOMITING: 0
ORTHOPNEA: 0
BLOATING: 0

## 2025-08-18 DIAGNOSIS — N39.0 RECURRENT UTI: ICD-10-CM

## 2025-08-18 DIAGNOSIS — N39.0 RECURRENT UTI: Primary | ICD-10-CM

## 2025-08-18 LAB
APPEARANCE UR: ABNORMAL
BACTERIA URNS QL MICRO: ABNORMAL /HPF
BILIRUB UR QL: NEGATIVE
COLOR UR: YELLOW
EPI CELLS #/AREA URNS HPF: ABNORMAL /HPF
GLUCOSE UR STRIP.AUTO-MCNC: 1000 MG/DL
HGB UR QL STRIP: NEGATIVE
KETONES UR QL STRIP.AUTO: ABNORMAL MG/DL
LEUKOCYTE ESTERASE UR QL STRIP.AUTO: ABNORMAL
NITRITE UR QL STRIP.AUTO: POSITIVE
OTHER OBSERVATIONS: ABNORMAL
PH UR STRIP: 6 (ref 5–9)
PROT UR STRIP-MCNC: NEGATIVE MG/DL
SP GR UR REFRACTOMETRY: 1.01 (ref 1–1.02)
UROBILINOGEN UR QL STRIP.AUTO: 0.2 EU/DL (ref 0.2–1)
WBC URNS QL MICRO: ABNORMAL /HPF
YEAST URNS QL MICRO: ABNORMAL

## 2025-08-20 LAB
BACTERIA SPEC CULT: ABNORMAL
BACTERIA SPEC CULT: ABNORMAL
SERVICE CMNT-IMP: ABNORMAL

## 2025-08-23 LAB — NONINV COLON CA DNA+OCC BLD SCRN STL QL: POSITIVE

## 2025-08-24 ENCOUNTER — PATIENT MESSAGE (OUTPATIENT)
Dept: INTERNAL MEDICINE CLINIC | Facility: CLINIC | Age: 65
End: 2025-08-24

## 2025-08-24 DIAGNOSIS — R19.5 POSITIVE COLORECTAL CANCER SCREENING USING COLOGUARD TEST: Primary | ICD-10-CM

## 2025-08-24 DIAGNOSIS — Z12.11 SCREEN FOR COLON CANCER: ICD-10-CM

## 2025-09-03 ENCOUNTER — PROCEDURE VISIT (OUTPATIENT)
Dept: UROLOGY | Age: 65
End: 2025-09-03
Payer: COMMERCIAL

## 2025-09-03 DIAGNOSIS — N32.81 OAB (OVERACTIVE BLADDER): Primary | ICD-10-CM

## 2025-09-03 DIAGNOSIS — N30.00 ACUTE CYSTITIS WITHOUT HEMATURIA: ICD-10-CM

## 2025-09-03 LAB
BILIRUBIN, URINE, POC: NEGATIVE
BLOOD URINE, POC: NORMAL
GLUCOSE URINE, POC: 1000 MG/DL
KETONES, URINE, POC: NEGATIVE MG/DL
LEUKOCYTE ESTERASE, URINE, POC: NORMAL
NITRITE, URINE, POC: NEGATIVE
PH, URINE, POC: 5 (ref 4.6–8)
PROTEIN,URINE, POC: NEGATIVE MG/DL
SPECIFIC GRAVITY, URINE, POC: 1 (ref 1–1.03)
URINALYSIS CLARITY, POC: NORMAL
URINALYSIS COLOR, POC: NORMAL
UROBILINOGEN, POC: NORMAL MG/DL

## 2025-09-03 PROCEDURE — 81003 URINALYSIS AUTO W/O SCOPE: CPT | Performed by: UROLOGY

## 2025-09-03 PROCEDURE — 52287 CYSTOSCOPY CHEMODENERVATION: CPT | Performed by: UROLOGY

## 2025-09-03 RX ORDER — CEPHALEXIN 500 MG/1
500 CAPSULE ORAL 2 TIMES DAILY
Qty: 2 CAPSULE | Refills: 0 | Status: SHIPPED | OUTPATIENT
Start: 2025-09-03 | End: 2025-09-04

## 2025-09-06 ENCOUNTER — TELEPHONE (OUTPATIENT)
Dept: UROLOGY | Age: 65
End: 2025-09-06

## 2025-09-06 DIAGNOSIS — N30.00 ACUTE CYSTITIS WITHOUT HEMATURIA: Primary | ICD-10-CM

## 2025-09-06 RX ORDER — SULFAMETHOXAZOLE AND TRIMETHOPRIM 800; 160 MG/1; MG/1
1 TABLET ORAL 2 TIMES DAILY
Qty: 14 TABLET | Refills: 0 | Status: SHIPPED | OUTPATIENT
Start: 2025-09-06 | End: 2025-09-13

## (undated) DEVICE — PTCA DILATATION CATHETER: Brand: NC QUANTUM APEX™

## (undated) DEVICE — MINI TREK CORONARY DILATATION CATHETER 1.50 MM X 20 MM / RAPID-EXCHANGE: Brand: MINI TREK

## (undated) DEVICE — NC TREK™ CORONARY DILATATION CATHETER 2.25 MM X 20 MM / RAPID-EXCHANGE: Brand: NC TREK™

## (undated) DEVICE — HI-TORQUE PILOT 50 GUIDE WIRE .014 J TIP 3.0 CM X 190 CM: Brand: HI-TORQUE PILOT

## (undated) DEVICE — NC TREK CORONARY DILATATION CATHETER 2.5 MM X 20 MM / RAPID-EXCHANGE: Brand: NC TREK

## (undated) DEVICE — GUIDEWIRE VASC L260CM DIA0.035IN RAD 3MM J TIP L7CM PTFE

## (undated) DEVICE — CATHETER GUID EXTRA BACKUP 3.5 0.070IN 6FR 100CM VISTA BRITE TIP

## (undated) DEVICE — CATHETER DIAG AD 5FR L100CM COR NYL JUDKINS R 5 DILATED

## (undated) DEVICE — COPILOT BLEEDBACK CONTROL VALVE: Brand: COPILOT

## (undated) DEVICE — CATHETER ANGIO 5FR L100CM GRY S STL NYL JL3.5 3 SEG BRAID L

## (undated) DEVICE — CATH BLLN DIL 2.0X20MM RX -- EUPHORA

## (undated) DEVICE — GLIDESHEATH SLENDER STAINLESS STEEL KIT: Brand: GLIDESHEATH SLENDER

## (undated) DEVICE — CATHETER DIAG AD 5FR L110CM 145DEG COR GRY HYDRPHLC NYL

## (undated) DEVICE — CORONARY IMAGING CATHETER: Brand: OPTICROSS™ HD

## (undated) DEVICE — BAND RADIAL COMPR ARTERY 24CM -- REG BX/10